# Patient Record
Sex: MALE | Race: BLACK OR AFRICAN AMERICAN | NOT HISPANIC OR LATINO | Employment: OTHER | URBAN - METROPOLITAN AREA
[De-identification: names, ages, dates, MRNs, and addresses within clinical notes are randomized per-mention and may not be internally consistent; named-entity substitution may affect disease eponyms.]

---

## 2017-01-04 ENCOUNTER — TELEPHONE (OUTPATIENT)
Dept: INTERNAL MEDICINE | Facility: CLINIC | Age: 62
End: 2017-01-04

## 2017-01-04 NOTE — TELEPHONE ENCOUNTER
----- Message from Lisa Howe sent at 1/4/2017  3:17 PM CST -----  _  1st Request  _  2nd Request  _  3rd Request        Who: Tee Mtz    Why: pt is calling to see if you can call something in for his sinus (cold pack) [facility]   Telephone Fax   [phone]        What Number to Call Back:[phone]    When to Expect a call back: (Before the end of the day)   -- if call after 3:00 call back will be tomorrow.

## 2017-01-13 RX ORDER — HYDROCHLOROTHIAZIDE 12.5 MG/1
CAPSULE ORAL
Qty: 30 CAPSULE | Refills: 0 | Status: SHIPPED | OUTPATIENT
Start: 2017-01-13 | End: 2017-02-14 | Stop reason: SDUPTHER

## 2017-01-13 RX ORDER — ATORVASTATIN CALCIUM 80 MG/1
TABLET, FILM COATED ORAL
Qty: 30 TABLET | Refills: 0 | Status: SHIPPED | OUTPATIENT
Start: 2017-01-13 | End: 2017-02-14 | Stop reason: SDUPTHER

## 2017-02-14 RX ORDER — HYDROCHLOROTHIAZIDE 12.5 MG/1
CAPSULE ORAL
Qty: 90 CAPSULE | Refills: 0 | Status: SHIPPED | OUTPATIENT
Start: 2017-02-14 | End: 2017-05-18 | Stop reason: SDUPTHER

## 2017-02-14 RX ORDER — ATORVASTATIN CALCIUM 80 MG/1
TABLET, FILM COATED ORAL
Qty: 90 TABLET | Refills: 0 | Status: SHIPPED | OUTPATIENT
Start: 2017-02-14 | End: 2017-05-18 | Stop reason: SDUPTHER

## 2017-02-27 ENCOUNTER — PATIENT OUTREACH (OUTPATIENT)
Dept: ADMINISTRATIVE | Facility: HOSPITAL | Age: 62
End: 2017-02-27

## 2017-03-14 ENCOUNTER — OFFICE VISIT (OUTPATIENT)
Dept: INTERNAL MEDICINE | Facility: CLINIC | Age: 62
End: 2017-03-14
Attending: FAMILY MEDICINE
Payer: MEDICAID

## 2017-03-14 VITALS
DIASTOLIC BLOOD PRESSURE: 84 MMHG | HEART RATE: 65 BPM | HEIGHT: 70 IN | WEIGHT: 200.19 LBS | SYSTOLIC BLOOD PRESSURE: 128 MMHG | BODY MASS INDEX: 28.66 KG/M2

## 2017-03-14 DIAGNOSIS — E78.5 HYPERLIPIDEMIA, UNSPECIFIED HYPERLIPIDEMIA TYPE: ICD-10-CM

## 2017-03-14 DIAGNOSIS — I10 ESSENTIAL HYPERTENSION: Primary | ICD-10-CM

## 2017-03-14 PROCEDURE — 99213 OFFICE O/P EST LOW 20 MIN: CPT | Mod: PBBFAC | Performed by: FAMILY MEDICINE

## 2017-03-14 PROCEDURE — 99213 OFFICE O/P EST LOW 20 MIN: CPT | Mod: S$PBB,,, | Performed by: FAMILY MEDICINE

## 2017-03-14 PROCEDURE — 99999 PR PBB SHADOW E&M-EST. PATIENT-LVL III: CPT | Mod: PBBFAC,,, | Performed by: FAMILY MEDICINE

## 2017-03-14 NOTE — MR AVS SNAPSHOT
[facility]  [address]  [place] [address]  Phone: [phone]  Fax: [phone]                  Tee Mtz   3/14/2017 6:40 AM   Office Visit    Description:  Male : 1955   Provider:  Ly Nava MD   Department:  [facility] Medicine           Reason for Visit     Hypertension     Hyperlipidemia           Diagnoses this Visit        Comments    Hyperlipidemia, unspecified hyperlipidemia type    -  Primary            To Do List           Future Appointments        Provider Department Dept Phone    2017 7:30 AM LAB, [facility] [phone]      Goals (5 Years of Data)     None      [facility]     [facility] On [facility] Nurse Care Line -  Assistance  Registered nurses in the [facility] Center provide clinical advisement, health education, appointment booking, and other advisory services.  Call for this free service at [phone].             Medications           Message regarding Medications     Verify the changes and/or additions to your medication regime listed below are the same as discussed with your clinician today.  If any of these changes or additions are incorrect, please notify your healthcare provider.        STOP taking these medications     hydrochlorothiazide (HYDRODIURIL) 12.5 MG Tab Take 1 tablet (12.5 mg total) by mouth once daily.           Verify that the below list of medications is an accurate representation of the medications you are currently taking.  If none reported, the list may be blank. If incorrect, please contact your healthcare provider. Carry this list with you in case of emergency.           Current Medications     atorvastatin (LIPITOR) 80 MG tablet TAKE ONE TABLET BY MOUTH AT BEDTIME    hydrochlorothiazide (MICROZIDE) 12.5 mg capsule TAKE ONE CAPSULE BY MOUTH EVERY DAY    lisinopril (PRINIVIL,ZESTRIL) 40 MG tablet Take 1 tablet (40 mg total) by mouth once daily.    metoprolol tartrate  "(LOPRESSOR) 50 MG tablet Take 1 tablet (50 mg total) by mouth 2 (two) times daily.    mometasone (NASONEX) 50 mcg/actuation nasal spray 2 sprays by Nasal route once daily.    ranitidine (ZANTAC) 150 MG tablet take one tablet by mouth twice daily Once a day Orally 30 days    trazodone (DESYREL) 50 MG tablet Take 1 tablet (50 mg total) by mouth nightly as needed for Insomnia.    hydrocodone-acetaminophen 10-325mg (NORCO)  mg Tab Take one tab po five times daily. Pt t is to be seen by Dr. Pitts  for future refills--DOMINGA ASTORGA           Clinical Reference Information           Your Vitals Were     BP Pulse Height Weight BMI    128/84 (BP Location: Left arm, Patient Position: Sitting, BP Method: Manual) 65 5' 10" (1.778 m) 90.8 kg (200 lb 2.8 oz) 28.72 kg/m2      Blood Pressure          Most Recent Value    BP  128/84      Allergies as of 3/14/2017     No Known Allergies      Immunizations Administered on Date of Encounter - 3/14/2017     None      Orders Placed During Today's Visit     Future Labs/Procedures Expected by Expires    Comprehensive metabolic panel  6/14/2017 3/14/2018    Hemoglobin A1c  6/14/2017 3/14/2018    Lipid panel  6/14/2017 3/14/2018      [facility] Sign-Up     Activating your [facility] account is as easy as 1-2-3!     1) Visit [email or web address], select Sign Up Now, enter this activation code and your date of birth, then select Next.  [email or web address]  Expires: 4/28/2017  7:24 AM      2) Create a username and password to use when you visit [facility] in the future and select a security question in case you lose your password and select Next.    3) Enter your e-mail address and click Sign Up!    Additional Information  If you have questions, please e-mail [email or web address] or call [phone] to talk to our [facility] staff. Remember, [facility] is NOT to be used for urgent needs. For medical emergencies, dial 911.         Language Assistance Services     ATTENTION: Language assistance " services are available, free of charge. Please call [phone].      ATENCIÓN: Si habla meaghan, tiene a mcpherson disposición servicios gratuitos de asistencia lingüística. Llame al [phone].     CHÚ Ý: N?u b?n nói Ti?ng Vi?t, có các d?ch v? h? tr? ngôn ng? mi?n phí dành cho b?n. G?i s? [phone].         [facility] complies with applicable Federal civil rights laws and does not discriminate on the basis of race, color, national origin, age, disability, or sex.

## 2017-03-14 NOTE — PROGRESS NOTES
Subjective:       Patient ID: Tee Mtz is a 61 y.o. male.    Chief Complaint: Hypertension (3 mth f/u ) and Hyperlipidemia    HPI Comments: Pt here for 3 mos f/u on HTN and labs. Aware today that labs look good exc for his chol panel and that a1c is at 6. Pt scared that he is developing diabetes and agrees that he needs to exercise. Pt willing to do and states that he will make lifestyle changes.    Hypertension   Pertinent negatives include no chest pain, palpitations or shortness of breath.   Hyperlipidemia   Pertinent negatives include no chest pain or shortness of breath.     Review of Systems   Constitutional: Negative for activity change and appetite change.   HENT: Negative.    Eyes: Negative for photophobia and visual disturbance.   Respiratory: Negative for chest tightness and shortness of breath.    Cardiovascular: Negative for chest pain, palpitations and leg swelling.   Neurological: Negative for dizziness, weakness and light-headedness.       Objective:      Physical Exam   Constitutional: He is oriented to person, place, and time. He appears well-developed and well-nourished.   HENT:   Head: Normocephalic and atraumatic.   Eyes: Conjunctivae and EOM are normal. Pupils are equal, round, and reactive to light. Right eye exhibits no discharge. Left eye exhibits no discharge. No scleral icterus.   Neck: Normal range of motion. Neck supple. No JVD present.   Cardiovascular: Normal rate, regular rhythm, normal heart sounds and intact distal pulses.    Pulmonary/Chest: Effort normal and breath sounds normal.   Musculoskeletal: Normal range of motion. He exhibits no edema or tenderness.   Neurological: He is alert and oriented to person, place, and time. He has normal reflexes. No cranial nerve deficit. He exhibits normal muscle tone. Coordination normal.   Skin: Skin is warm and dry.   Psychiatric: He has a normal mood and affect. His behavior is normal. Judgment and thought content normal.        Assessment:       1. Hyperlipidemia, unspecified hyperlipidemia type      2. HTN    Plan:       HTN controlled on meds  elev chol: lifestyle mods d/w pt. Repeat in 3 mos ordered today  a1c will be repeated as well  RTC prn symptoms or 3 mos PENDING lab results

## 2017-05-18 DIAGNOSIS — G47.00 INSOMNIA, UNSPECIFIED TYPE: ICD-10-CM

## 2017-05-18 DIAGNOSIS — I10 ESSENTIAL HYPERTENSION: ICD-10-CM

## 2017-05-18 RX ORDER — ATORVASTATIN CALCIUM 80 MG/1
TABLET, FILM COATED ORAL
Qty: 90 TABLET | Refills: 0 | Status: SHIPPED | OUTPATIENT
Start: 2017-05-18 | End: 2017-08-16 | Stop reason: SDUPTHER

## 2017-05-18 RX ORDER — LISINOPRIL 40 MG/1
40 TABLET ORAL DAILY
Qty: 90 TABLET | Refills: 0 | Status: SHIPPED | OUTPATIENT
Start: 2017-05-18 | End: 2017-08-16 | Stop reason: SDUPTHER

## 2017-05-18 RX ORDER — TRAZODONE HYDROCHLORIDE 50 MG/1
TABLET ORAL
Qty: 30 TABLET | Refills: 0 | Status: SHIPPED | OUTPATIENT
Start: 2017-05-18 | End: 2017-09-20 | Stop reason: SDUPTHER

## 2017-05-18 RX ORDER — METOPROLOL TARTRATE 50 MG/1
50 TABLET ORAL 2 TIMES DAILY
Qty: 180 TABLET | Refills: 0 | Status: SHIPPED | OUTPATIENT
Start: 2017-05-18 | End: 2017-09-20 | Stop reason: SDUPTHER

## 2017-05-18 RX ORDER — HYDROCHLOROTHIAZIDE 12.5 MG/1
CAPSULE ORAL
Qty: 90 CAPSULE | Refills: 0 | Status: SHIPPED | OUTPATIENT
Start: 2017-05-18 | End: 2017-08-16 | Stop reason: SDUPTHER

## 2017-05-18 NOTE — TELEPHONE ENCOUNTER
----- Message from Sury Leonard sent at 5/18/2017  8:32 AM CDT -----  Contact: pt  x_  1st Request  _  2nd Request  _  3rd Request    Please refill the medication(s) listed below.     Medication #1atorvastatin (LIPITOR) 80 MG tablet    Medication #2hydrochlorothiazide (MICROZIDE) 12.5 mg capsule    Medication#3 lisinopril (PRINIVIL,ZESTRIL) 40 MG tablet    Medication#4metoprolol tartrate (LOPRESSOR) 50 MG tablet    Medication#5ranitidine (ZANTAC) 150 MG tablet    Medication#6trazodone (DESYREL) 50 MG tablet      Preferred Pharmacy:[address]

## 2017-07-10 DIAGNOSIS — Z12.11 COLON CANCER SCREENING: ICD-10-CM

## 2017-08-16 DIAGNOSIS — I10 ESSENTIAL HYPERTENSION: ICD-10-CM

## 2017-08-16 RX ORDER — ATORVASTATIN CALCIUM 80 MG/1
TABLET, FILM COATED ORAL
Qty: 30 TABLET | Refills: 0 | Status: SHIPPED | OUTPATIENT
Start: 2017-08-16 | End: 2017-09-20 | Stop reason: SDUPTHER

## 2017-08-16 RX ORDER — LISINOPRIL 40 MG/1
TABLET ORAL
Qty: 30 TABLET | Refills: 0 | Status: SHIPPED | OUTPATIENT
Start: 2017-08-16 | End: 2017-09-20 | Stop reason: SDUPTHER

## 2017-08-16 RX ORDER — HYDROCHLOROTHIAZIDE 12.5 MG/1
CAPSULE ORAL
Qty: 30 CAPSULE | Refills: 0 | Status: SHIPPED | OUTPATIENT
Start: 2017-08-16 | End: 2017-09-20 | Stop reason: SDUPTHER

## 2017-08-22 DIAGNOSIS — G47.00 INSOMNIA, UNSPECIFIED TYPE: ICD-10-CM

## 2017-08-22 RX ORDER — TRAZODONE HYDROCHLORIDE 50 MG/1
TABLET ORAL
Qty: 30 TABLET | OUTPATIENT
Start: 2017-08-22

## 2017-08-24 ENCOUNTER — TELEPHONE (OUTPATIENT)
Dept: INTERNAL MEDICINE | Facility: CLINIC | Age: 62
End: 2017-08-24

## 2017-08-24 NOTE — TELEPHONE ENCOUNTER
----- Message from Irena Johnson sent at 8/24/2017 10:44 AM CDT -----  Contact: CHOLO MAYFIELD [[id number]]  _x  1st Request  _  2nd Request  _  3rd Request    Please refill the medication(s) listed below.     Medication #1 trazodone (DESYREL) 50 MG tablet    Preferred Pharmacy:  [address] [address]  Phone: [phone] Fax: [phone]

## 2017-08-24 NOTE — TELEPHONE ENCOUNTER
Left a detailed message informing the pt that Dr. Nava was out until Monday and that at his last office visit in march he should have return in 3 mos. I advised the pt to call back and schedule a follow up appointment for continued med refill

## 2017-09-20 DIAGNOSIS — G47.00 INSOMNIA, UNSPECIFIED TYPE: ICD-10-CM

## 2017-09-20 DIAGNOSIS — I10 ESSENTIAL HYPERTENSION: ICD-10-CM

## 2017-09-20 RX ORDER — LISINOPRIL 40 MG/1
40 TABLET ORAL DAILY
Qty: 30 TABLET | Refills: 0 | Status: SHIPPED | OUTPATIENT
Start: 2017-09-20 | End: 2017-11-18 | Stop reason: SDUPTHER

## 2017-09-20 RX ORDER — HYDROCHLOROTHIAZIDE 12.5 MG/1
12.5 CAPSULE ORAL DAILY
Qty: 30 CAPSULE | Refills: 0 | Status: SHIPPED | OUTPATIENT
Start: 2017-09-20 | End: 2017-12-21 | Stop reason: SDUPTHER

## 2017-09-20 RX ORDER — TRAZODONE HYDROCHLORIDE 50 MG/1
50 TABLET ORAL NIGHTLY
Qty: 30 TABLET | Refills: 0 | Status: SHIPPED | OUTPATIENT
Start: 2017-09-20 | End: 2017-12-21 | Stop reason: SDUPTHER

## 2017-09-20 RX ORDER — ATORVASTATIN CALCIUM 80 MG/1
80 TABLET, FILM COATED ORAL NIGHTLY
Qty: 30 TABLET | Refills: 0 | Status: SHIPPED | OUTPATIENT
Start: 2017-09-20 | End: 2017-12-21 | Stop reason: SDUPTHER

## 2017-09-20 RX ORDER — HYDROCODONE BITARTRATE AND ACETAMINOPHEN 10; 325 MG/1; MG/1
TABLET ORAL
Qty: 35 TABLET | Refills: 0 | OUTPATIENT
Start: 2017-09-20

## 2017-09-20 RX ORDER — MOMETASONE FUROATE 50 UG/1
2 SPRAY, METERED NASAL DAILY
Qty: 17 G | Refills: 5 | Status: SHIPPED | OUTPATIENT
Start: 2017-09-20 | End: 2018-02-13

## 2017-09-20 RX ORDER — METOPROLOL TARTRATE 50 MG/1
50 TABLET ORAL 2 TIMES DAILY
Qty: 180 TABLET | Refills: 0 | Status: SHIPPED | OUTPATIENT
Start: 2017-09-20 | End: 2017-12-21 | Stop reason: SDUPTHER

## 2017-09-20 NOTE — TELEPHONE ENCOUNTER
"----- Message from Lou Nielsen sent at 9/20/2017  9:01 AM CDT -----  Contact: Patient Himself  X 1st Request  _  2nd Request  _  3rd Request    Who: Tee Mtz (mrn# [id number])    Why: Patient called requesting to schedule annual visit. I did attempt to schedule per patient's request; but I was unsuccessful.  Also patient states, "he needs a refill on all of his medications." Please give a call back at your earliest convenience.     THANKS!    What Number to Call Back: [phone]    When to Expect a call back: (With in 24 hours)                      "

## 2017-09-22 DIAGNOSIS — Z12.11 COLON CANCER SCREENING: ICD-10-CM

## 2017-11-18 DIAGNOSIS — I10 ESSENTIAL HYPERTENSION: ICD-10-CM

## 2017-11-20 RX ORDER — ATORVASTATIN CALCIUM 80 MG/1
80 TABLET, FILM COATED ORAL NIGHTLY
Qty: 30 TABLET | OUTPATIENT
Start: 2017-11-20

## 2017-11-20 RX ORDER — HYDROCHLOROTHIAZIDE 12.5 MG/1
12.5 CAPSULE ORAL DAILY
Qty: 30 CAPSULE | OUTPATIENT
Start: 2017-11-20

## 2017-11-20 RX ORDER — LISINOPRIL 40 MG/1
40 TABLET ORAL DAILY
Qty: 30 TABLET | Refills: 0 | Status: SHIPPED | OUTPATIENT
Start: 2017-11-20 | End: 2017-12-21 | Stop reason: SDUPTHER

## 2017-12-17 RX ORDER — HYDROCHLOROTHIAZIDE 12.5 MG/1
12.5 CAPSULE ORAL DAILY
Qty: 30 CAPSULE | OUTPATIENT
Start: 2017-12-17

## 2017-12-17 RX ORDER — ATORVASTATIN CALCIUM 80 MG/1
80 TABLET, FILM COATED ORAL NIGHTLY
Qty: 30 TABLET | OUTPATIENT
Start: 2017-12-17

## 2017-12-18 DIAGNOSIS — I10 ESSENTIAL HYPERTENSION: ICD-10-CM

## 2017-12-18 DIAGNOSIS — G47.00 INSOMNIA, UNSPECIFIED TYPE: ICD-10-CM

## 2017-12-18 DIAGNOSIS — E78.5 HYPERLIPIDEMIA LDL GOAL <130: Primary | ICD-10-CM

## 2017-12-18 RX ORDER — ATORVASTATIN CALCIUM 80 MG/1
80 TABLET, FILM COATED ORAL NIGHTLY
Qty: 30 TABLET | OUTPATIENT
Start: 2017-12-18

## 2017-12-18 RX ORDER — HYDROCHLOROTHIAZIDE 12.5 MG/1
12.5 CAPSULE ORAL DAILY
Qty: 30 CAPSULE | Refills: 0 | OUTPATIENT
Start: 2017-12-18

## 2017-12-18 RX ORDER — TRAZODONE HYDROCHLORIDE 50 MG/1
50 TABLET ORAL NIGHTLY
Qty: 30 TABLET | Refills: 0 | OUTPATIENT
Start: 2017-12-18

## 2017-12-18 RX ORDER — LISINOPRIL 40 MG/1
40 TABLET ORAL DAILY
Qty: 30 TABLET | Refills: 0 | OUTPATIENT
Start: 2017-12-18

## 2017-12-18 RX ORDER — ATORVASTATIN CALCIUM 80 MG/1
80 TABLET, FILM COATED ORAL NIGHTLY
Qty: 30 TABLET | Refills: 0 | OUTPATIENT
Start: 2017-12-18

## 2017-12-18 RX ORDER — HYDROCHLOROTHIAZIDE 12.5 MG/1
12.5 CAPSULE ORAL DAILY
Qty: 30 CAPSULE | OUTPATIENT
Start: 2017-12-18

## 2017-12-18 NOTE — TELEPHONE ENCOUNTER
----- Message from Dagoberto Angeles sent at 12/18/2017 11:26 AM CST -----  Contact: Pt  X_ 1st Request  _ 2nd Request  _ 3rd Request      Who: CHOLO MAYFIELD [[id number]]    Why: Patient is requesting all his prescription for refills on all medication    What Number to Call Back: [phone]    When to Expect a call back: (Before the end of the day)  -- if call after 3:00 call back will be tomorrow.

## 2017-12-19 NOTE — TELEPHONE ENCOUNTER
----- Message from Lisa Howe sent at 12/19/2017 11:57 AM CST -----  _  1st Request  _  2nd Request  _  3rd Request        Who: patient     Why: Requesting a call back in regards to getting refills of all his meds, informed pt he needed to make a follow up appt. He said if you can't see him in the next couple of days he will fine another doctor. He told me to put that in the message. Pt has medicaid and it would not let me schedule an appt    What Number to Call Back:[phone]    When to Expect a call back: (Within 24 hours)    Please return the call at earliest convenience. Thanks!

## 2017-12-19 NOTE — TELEPHONE ENCOUNTER
Spoke with the pt in regards to medication refill informed him the per Dr. Nava refills where denied needs to be seen. Schedule follow up appointment will mail reminder  Conversation was understood and it ended

## 2017-12-20 ENCOUNTER — TELEPHONE (OUTPATIENT)
Dept: INTERNAL MEDICINE | Facility: CLINIC | Age: 62
End: 2017-12-20

## 2017-12-20 NOTE — TELEPHONE ENCOUNTER
----- Message from Cris Luna sent at 12/20/2017  3:01 PM CST -----  Contact: Pat   x  1st Request  _  2nd Request  _  3rd Request      Please refill the medication(s) listed below. Please call the patient when the prescription(s) is ready for  at the phone number     [phone] Pat from the pharmacy  .    Medication #1 hydrochlorothiazide (MICROZIDE) 12.5 mg capsule 30 capsule     Medication #2 ranitidine (ZANTAC) 150 MG tablet 60 tablet     Medication #3 atorvastatin (LIPITOR) 80 MG tablet 30 tablet       Preferred Pharmacy:[address]

## 2017-12-20 NOTE — TELEPHONE ENCOUNTER
Spoke with Mr. Watts the pharmacist and informed him as well as advising the pt on yesterday that his prescription request was denied until seen. Mr Watts will contact the patient. Conversation was understood and it ended.

## 2017-12-21 ENCOUNTER — LAB VISIT (OUTPATIENT)
Dept: LAB | Facility: OTHER | Age: 62
End: 2017-12-21
Attending: FAMILY MEDICINE
Payer: MEDICAID

## 2017-12-21 ENCOUNTER — TELEPHONE (OUTPATIENT)
Dept: INTERNAL MEDICINE | Facility: CLINIC | Age: 62
End: 2017-12-21

## 2017-12-21 ENCOUNTER — OFFICE VISIT (OUTPATIENT)
Dept: INTERNAL MEDICINE | Facility: CLINIC | Age: 62
End: 2017-12-21
Attending: FAMILY MEDICINE
Payer: MEDICAID

## 2017-12-21 VITALS
HEIGHT: 70 IN | DIASTOLIC BLOOD PRESSURE: 89 MMHG | BODY MASS INDEX: 26.92 KG/M2 | SYSTOLIC BLOOD PRESSURE: 125 MMHG | HEART RATE: 91 BPM | WEIGHT: 188.06 LBS

## 2017-12-21 DIAGNOSIS — E78.5 HYPERLIPIDEMIA LDL GOAL <100: ICD-10-CM

## 2017-12-21 DIAGNOSIS — I10 ESSENTIAL HYPERTENSION: ICD-10-CM

## 2017-12-21 DIAGNOSIS — E78.5 HYPERLIPIDEMIA, UNSPECIFIED HYPERLIPIDEMIA TYPE: ICD-10-CM

## 2017-12-21 DIAGNOSIS — E78.5 HYPERLIPIDEMIA, UNSPECIFIED HYPERLIPIDEMIA TYPE: Primary | ICD-10-CM

## 2017-12-21 DIAGNOSIS — G47.00 INSOMNIA, UNSPECIFIED TYPE: ICD-10-CM

## 2017-12-21 LAB
ALBUMIN SERPL BCP-MCNC: 3.6 G/DL
ALP SERPL-CCNC: 54 U/L
ALT SERPL W/O P-5'-P-CCNC: 29 U/L
ANION GAP SERPL CALC-SCNC: 10 MMOL/L
AST SERPL-CCNC: 23 U/L
BILIRUB SERPL-MCNC: 0.3 MG/DL
BUN SERPL-MCNC: 10 MG/DL
CALCIUM SERPL-MCNC: 9.5 MG/DL
CHLORIDE SERPL-SCNC: 108 MMOL/L
CHOLEST SERPL-MCNC: 118 MG/DL
CHOLEST/HDLC SERPL: 3.6 {RATIO}
CO2 SERPL-SCNC: 23 MMOL/L
CREAT SERPL-MCNC: 1 MG/DL
EST. GFR  (AFRICAN AMERICAN): >60 ML/MIN/1.73 M^2
EST. GFR  (NON AFRICAN AMERICAN): >60 ML/MIN/1.73 M^2
ESTIMATED AVG GLUCOSE: 111 MG/DL
GLUCOSE SERPL-MCNC: 76 MG/DL
HBA1C MFR BLD HPLC: 5.5 %
HDLC SERPL-MCNC: 33 MG/DL
HDLC SERPL: 28 %
LDLC SERPL CALC-MCNC: 68 MG/DL
NONHDLC SERPL-MCNC: 85 MG/DL
POTASSIUM SERPL-SCNC: 3.9 MMOL/L
PROT SERPL-MCNC: 7.5 G/DL
SODIUM SERPL-SCNC: 141 MMOL/L
TRIGL SERPL-MCNC: 85 MG/DL
TSH SERPL DL<=0.005 MIU/L-ACNC: 1.03 UIU/ML

## 2017-12-21 PROCEDURE — 99214 OFFICE O/P EST MOD 30 MIN: CPT | Mod: S$PBB,,, | Performed by: FAMILY MEDICINE

## 2017-12-21 PROCEDURE — 99213 OFFICE O/P EST LOW 20 MIN: CPT | Mod: PBBFAC | Performed by: FAMILY MEDICINE

## 2017-12-21 PROCEDURE — 80061 LIPID PANEL: CPT

## 2017-12-21 PROCEDURE — 80053 COMPREHEN METABOLIC PANEL: CPT

## 2017-12-21 PROCEDURE — 36415 COLL VENOUS BLD VENIPUNCTURE: CPT

## 2017-12-21 PROCEDURE — 99999 PR PBB SHADOW E&M-EST. PATIENT-LVL III: CPT | Mod: PBBFAC,,, | Performed by: FAMILY MEDICINE

## 2017-12-21 PROCEDURE — 83036 HEMOGLOBIN GLYCOSYLATED A1C: CPT

## 2017-12-21 PROCEDURE — 84443 ASSAY THYROID STIM HORMONE: CPT

## 2017-12-21 RX ORDER — CYPROHEPTADINE HYDROCHLORIDE 4 MG/1
4 TABLET ORAL 3 TIMES DAILY PRN
Qty: 90 TABLET | Refills: 3 | Status: SHIPPED | OUTPATIENT
Start: 2017-12-21 | End: 2018-02-13

## 2017-12-21 RX ORDER — FLUTICASONE PROPIONATE 50 MCG
2 SPRAY, SUSPENSION (ML) NASAL DAILY
Refills: 5 | COMMUNITY
Start: 2017-11-18 | End: 2018-02-13

## 2017-12-21 RX ORDER — HYDROCHLOROTHIAZIDE 12.5 MG/1
12.5 CAPSULE ORAL DAILY
Qty: 30 CAPSULE | Refills: 6 | Status: SHIPPED | OUTPATIENT
Start: 2017-12-21 | End: 2018-08-08 | Stop reason: SDUPTHER

## 2017-12-21 RX ORDER — LISINOPRIL 40 MG/1
40 TABLET ORAL DAILY
Qty: 30 TABLET | Refills: 6 | Status: SHIPPED | OUTPATIENT
Start: 2017-12-21 | End: 2018-08-08 | Stop reason: SDUPTHER

## 2017-12-21 RX ORDER — NAPROXEN SODIUM 550 MG/1
TABLET ORAL
Refills: 0 | COMMUNITY
Start: 2017-10-20 | End: 2018-02-13

## 2017-12-21 RX ORDER — TRAMADOL HYDROCHLORIDE 50 MG/1
50 TABLET ORAL 2 TIMES DAILY PRN
Qty: 60 TABLET | Refills: 0 | Status: CANCELLED | OUTPATIENT
Start: 2017-12-21

## 2017-12-21 RX ORDER — METOPROLOL TARTRATE 50 MG/1
50 TABLET ORAL 2 TIMES DAILY
Qty: 180 TABLET | Refills: 6 | Status: SHIPPED | OUTPATIENT
Start: 2017-12-21 | End: 2018-08-08 | Stop reason: SDUPTHER

## 2017-12-21 RX ORDER — ATORVASTATIN CALCIUM 80 MG/1
80 TABLET, FILM COATED ORAL NIGHTLY
Qty: 30 TABLET | Refills: 6 | Status: SHIPPED | OUTPATIENT
Start: 2017-12-21 | End: 2018-08-08 | Stop reason: SDUPTHER

## 2017-12-21 RX ORDER — TRAZODONE HYDROCHLORIDE 100 MG/1
100 TABLET ORAL NIGHTLY
Qty: 30 TABLET | Refills: 5 | Status: SHIPPED | OUTPATIENT
Start: 2017-12-21 | End: 2018-09-04 | Stop reason: SDUPTHER

## 2017-12-21 NOTE — TELEPHONE ENCOUNTER
Pt has scheduled an appt today for medication refills. Pt verbalized understanding that he needed appt for refills and had no further questions or concerns.

## 2017-12-21 NOTE — TELEPHONE ENCOUNTER
"----- Message from Lisa Howe sent at 12/21/2017  9:01 AM CST -----  _  1st Request  _  2nd Request  _  3rd Request        Who: patient     Why: Requesting a call back in regards to his refill of his RX, pt is very upset " So what you're telling me is I have to go until the 9th with no medicine" please call pt     What Number to Call Back:[phone]    When to Expect a call back: (Within 24 hours)    Please return the call at earliest convenience. Thanks!      _  1st Request  _  2nd Request  _  3rd Request        Who:     Why: Requesting a call back in regards to     What Number to Call Back:    When to Expect a call back: (Within 24 hours)    Please return the call at earliest convenience. Thanks!                                                "

## 2017-12-21 NOTE — TELEPHONE ENCOUNTER
----- Message from Lisa Howe sent at 12/21/2017  9:58 AM CST -----  _  1st Request  _  2nd Request  _  3rd Request        Who: patient    Why: Returning a call from your office    What Number to Call Back:[phone]    When to Expect a call back: (Within 24 hours)    Please return the call at earliest convenience. Thanks!

## 2017-12-21 NOTE — TELEPHONE ENCOUNTER
Pt has scheduled appt for today for med refills. Pt understood he needed an appt to receive further refills. Pt demonstrated verbal understanding of information and had no further questions or concerns at this time.

## 2017-12-22 ENCOUNTER — TELEPHONE (OUTPATIENT)
Dept: INTERNAL MEDICINE | Facility: CLINIC | Age: 62
End: 2017-12-22

## 2017-12-22 NOTE — TELEPHONE ENCOUNTER
----- Message from Jj Montgomery MD sent at 12/22/2017  1:01 PM CST -----  Blood work looks very good.  Cholesterol is very good.  No changes in medications.  Followup as scheduled.

## 2017-12-22 NOTE — TELEPHONE ENCOUNTER
Pt inform that blood work looks good and no med changes at this time and he should f/u as schedule.Pt agrees and verbalize and has no further questions.

## 2017-12-22 NOTE — PROGRESS NOTES
"CHIEF COMPLAINT: Follow-up hypertension and hyperlipidemia    HISTORY OF PRESENT ILLNESS: The patient is a 62 -year-old white male.  The patient has no specific complaints today.  It has been a while since the patient has seen his primary care physician.     The patient has a history of stable hypertension on current medications.  Patient denies chest pain or shortness of breath today.    The patient has a history of stable hyperlipidemia on current medications.  The patient denies chest pain or shortness of breath today.  The patient denies muscle aches or myalgias suggestive of myositis.    REVIEW OF SYSTEMS:  GENERAL: No fever, chills, fatigability or weight loss.  SKIN: No rashes, itching or changes in color or texture of skin.  HEAD: No headaches or recent head trauma.  EYES: Visual acuity fine. No photophobia, ocular pain or diplopia.  EARS: Denies ear pain, discharge or vertigo.  NOSE: No loss of smell, no epistaxis or postnasal drip.  MOUTH & THROAT: No hoarseness or change in voice. No excessive gum bleeding.  NODES: Denies swollen glands.  CHEST: Denies RODARTE, cyanosis, wheezing, cough and sputum production.  CARDIOVASCULAR: Denies chest pain, PND, orthopnea or reduced exercise tolerance.  ABDOMEN: Appetite fine. No weight loss. Denies diarrhea, abdominal pain, hematemesis or blood in stool.  URINARY: No flank pain, dysuria or hematuria.  PERIPHERAL VASCULAR: No claudication or cyanosis.  MUSCULOSKELETAL: No joint stiffness or swelling.   NEUROLOGIC: No history of seizures, paralysis, alteration of gait or coordination.    SOCIAL HISTORY: The patient does not smoke.  The patient consumes alcohol socially.      PHYSICAL EXAMINATION:   Blood pressure 125/89, pulse 91, height 5' 10" (1.778 m), weight 85.3 kg (188 lb 0.8 oz).    APPEARANCE: Well nourished, well developed, in no acute distress.    HEAD: Normocephalic, atraumatic.  EYES: PERRL. EOMI.  Conjunctivae without injection and  anicteric  EARS: TM's " intact. Light reflex normal. No retraction or perforation.    NOSE: Mucosa pink. Airway clear.  MOUTH & THROAT: No tonsillar enlargement. No pharyngeal erythema or exudate. No stridor.  NECK: Supple.   NODES: No cervical, axillary or inguinal lymph node enlargement.  CHEST: Lungs clear to auscultation.  No retractions are noted.  No rales or rhonchi are present.  CARDIOVASCULAR: Normal S1, S2. No rubs, murmurs or gallops.  ABDOMEN: Bowel sounds normal. Not distended. Soft. No tenderness or masses.  No ascites is noted.  MUSCULOSKELETAL:  There is no clubbing, cyanosis, or edema of the extremities x4.  There is full range of motion of the lumbar spine.  There is full range of motion of the extremities x4.  There is no deformity noted.    NEUROLOGIC:       Normal speech development.      Hearing normal.      Normal gait.      Motor and sensory exams grossly normal.      DTR's normal.  PSYCHIATRIC: Patient is alert and oriented x3.  Thought processes are all normal.  There is no homicidality.  There is no suicidality.  There is no evidence of psychosis.    LABORATORY/RADIOLOGY:   Chart reviewed.  We will update blood work today.    ASSESSMENT:   Hypertension  Hyperlipidemia   Insomnia not responsive to current dose of trazodone     PLAN:  Follow up blood work  Increase trazodone to 100 qhs  Return to clinic next year.

## 2017-12-28 ENCOUNTER — PATIENT OUTREACH (OUTPATIENT)
Dept: ADMINISTRATIVE | Facility: HOSPITAL | Age: 62
End: 2017-12-28

## 2017-12-28 NOTE — PROGRESS NOTES
[facility] is committed to your overall health.  To help you get the most out of each of your visits, we will review your information to make sure you are up to date on all of your recommended tests and/or procedures.      Dr. Nava has found that your chart shows you may be due for:    Health Maintenance Due   Topic Date Due    Pneumococcal PPSV23 (Medium Risk) (1) 04/02/1973    Colonoscopy  04/02/2005    Zoster Vaccine  04/02/2015    Influenza Vaccine  08/01/2017        If you have had any of the above done at another facility, please bring the records or information with you so that your record at [facility] will be complete.  If you would like to schedule any of these, please contact me.      Funmilayo Tafoya LPN  Clinic Care Coordinator  Internal Medicine  [facility]  [address]  [place] [address]  [phone]

## 2018-01-31 ENCOUNTER — OFFICE VISIT (OUTPATIENT)
Dept: INTERNAL MEDICINE | Facility: CLINIC | Age: 63
End: 2018-01-31
Attending: FAMILY MEDICINE
Payer: MEDICAID

## 2018-01-31 VITALS
HEIGHT: 69 IN | HEART RATE: 68 BPM | OXYGEN SATURATION: 100 % | SYSTOLIC BLOOD PRESSURE: 132 MMHG | WEIGHT: 187.81 LBS | BODY MASS INDEX: 27.82 KG/M2 | DIASTOLIC BLOOD PRESSURE: 89 MMHG

## 2018-01-31 DIAGNOSIS — Z98.61 CAD S/P PERCUTANEOUS CORONARY ANGIOPLASTY: ICD-10-CM

## 2018-01-31 DIAGNOSIS — G89.29 CHRONIC BILATERAL LOW BACK PAIN WITH BILATERAL SCIATICA: Primary | ICD-10-CM

## 2018-01-31 DIAGNOSIS — I10 ESSENTIAL HYPERTENSION: ICD-10-CM

## 2018-01-31 DIAGNOSIS — M54.41 CHRONIC BILATERAL LOW BACK PAIN WITH BILATERAL SCIATICA: Primary | ICD-10-CM

## 2018-01-31 DIAGNOSIS — I25.10 CAD S/P PERCUTANEOUS CORONARY ANGIOPLASTY: ICD-10-CM

## 2018-01-31 DIAGNOSIS — E78.5 HYPERLIPIDEMIA LDL GOAL <100: ICD-10-CM

## 2018-01-31 DIAGNOSIS — M54.42 CHRONIC BILATERAL LOW BACK PAIN WITH BILATERAL SCIATICA: Primary | ICD-10-CM

## 2018-01-31 PROCEDURE — 99213 OFFICE O/P EST LOW 20 MIN: CPT | Mod: PBBFAC | Performed by: FAMILY MEDICINE

## 2018-01-31 PROCEDURE — 99214 OFFICE O/P EST MOD 30 MIN: CPT | Mod: S$PBB,,, | Performed by: FAMILY MEDICINE

## 2018-01-31 PROCEDURE — 3008F BODY MASS INDEX DOCD: CPT | Mod: ,,, | Performed by: FAMILY MEDICINE

## 2018-01-31 PROCEDURE — 99999 PR PBB SHADOW E&M-EST. PATIENT-LVL III: CPT | Mod: PBBFAC,,, | Performed by: FAMILY MEDICINE

## 2018-01-31 NOTE — PROGRESS NOTES
"CHIEF COMPLAINT: Follow-up hypertension and hyperlipidemia    HISTORY OF PRESENT ILLNESS: The patient is a 62 -year-old white male.  The patient has chronic back pain.  He can no longer see his previous pain management physician due to insurance reasons.      The patient has a history of stable hypertension on current medications.  Patient denies chest pain or shortness of breath today.    The patient has a history of stable hyperlipidemia on current medications.  The patient denies chest pain or shortness of breath today.  The patient denies muscle aches or myalgias suggestive of myositis.    REVIEW OF SYSTEMS:  GENERAL: No fever, chills, fatigability or weight loss.  SKIN: No rashes, itching or changes in color or texture of skin.  HEAD: No headaches or recent head trauma.  EYES: Visual acuity fine. No photophobia, ocular pain or diplopia.  EARS: Denies ear pain, discharge or vertigo.  NOSE: No loss of smell, no epistaxis or postnasal drip.  MOUTH & THROAT: No hoarseness or change in voice. No excessive gum bleeding.  NODES: Denies swollen glands.  CHEST: Denies RODARTE, cyanosis, wheezing, cough and sputum production.  CARDIOVASCULAR: Denies chest pain, PND, orthopnea or reduced exercise tolerance.  ABDOMEN: Appetite fine. No weight loss. Denies diarrhea, abdominal pain, hematemesis or blood in stool.  URINARY: No flank pain, dysuria or hematuria.  PERIPHERAL VASCULAR: No claudication or cyanosis.  MUSCULOSKELETAL: No joint stiffness or swelling.   NEUROLOGIC: No history of seizures, paralysis, alteration of gait or coordination.    SOCIAL HISTORY: The patient does not smoke.  The patient consumes alcohol socially.      PHYSICAL EXAMINATION:   Blood pressure 132/89, pulse 68, height 5' 9" (1.753 m), weight 85.2 kg (187 lb 13.3 oz), SpO2 100 %.    APPEARANCE: Well nourished, well developed, in no acute distress.    HEAD: Normocephalic, atraumatic.  EYES: PERRL. EOMI.  Conjunctivae without injection and  " anicteric  EARS: TM's intact. Light reflex normal. No retraction or perforation.    NOSE: Mucosa pink. Airway clear.  MOUTH & THROAT: No tonsillar enlargement. No pharyngeal erythema or exudate. No stridor.  NECK: Supple.   NODES: No cervical, axillary or inguinal lymph node enlargement.  CHEST: Lungs clear to auscultation.  No retractions are noted.  No rales or rhonchi are present.  CARDIOVASCULAR: Normal S1, S2. No rubs, murmurs or gallops.  ABDOMEN: Bowel sounds normal. Not distended. Soft. No tenderness or masses.  No ascites is noted.  MUSCULOSKELETAL:  There is no clubbing, cyanosis, or edema of the extremities x4.  There is full range of motion of the lumbar spine.  There is full range of motion of the extremities x4.  There is no deformity noted.    NEUROLOGIC:       Normal speech development.      Hearing normal.      Normal gait.      Motor and sensory exams grossly normal.      DTR's normal.  PSYCHIATRIC: Patient is alert and oriented x3.  Thought processes are all normal.  There is no homicidality.  There is no suicidality.  There is no evidence of psychosis.    LABORATORY/RADIOLOGY:   Chart reviewed.      ASSESSMENT:   Chronic low back pain  Hypertension  Hyperlipidemia   Insomnia not responsive to current dose of trazodone     PLAN:  Pain clinic referral  Trazodone 100 qhs  Return to clinic next year.

## 2018-06-05 RX ORDER — CYPROHEPTADINE HYDROCHLORIDE 4 MG/1
4 TABLET ORAL 3 TIMES DAILY
Refills: 3 | COMMUNITY
Start: 2018-04-04 | End: 2018-08-08 | Stop reason: SDUPTHER

## 2018-06-05 RX ORDER — CYPROHEPTADINE HYDROCHLORIDE 4 MG/1
4 TABLET ORAL 3 TIMES DAILY
Refills: 3 | OUTPATIENT
Start: 2018-06-05

## 2018-06-05 NOTE — TELEPHONE ENCOUNTER
----- Message from Stephy Montemayor sent at 6/5/2018 11:20 AM CDT -----  Contact: CHOLO MAYFIELD [[id number]]  Can the clinic reply in [facility]: No      Please refill the medication(s) listed below. The patient can be reached at this phone number [phone]) once it is called into the pharmacy.      Medication #1 cyproheptadine (PERIACTIN) 4 mg tablet       Preferred Pharmacy: [address]

## 2018-07-26 DIAGNOSIS — R63.4 WEIGHT LOSS: Primary | ICD-10-CM

## 2018-07-26 NOTE — TELEPHONE ENCOUNTER
----- Message from Carmen Espinoza sent at 7/26/2018  2:43 PM CDT -----  Contact: pt   Can the clinic reply in [facility]:No       Please refill the medication(s) listed below. Please call the patient when the prescription(s) is ready for  at the phone number [phone]    Medication #1:cyproheptadine (PERIACTIN) 4 mg tablet    Medication #2:      Preferred Pharmacy: [place], [place] - [address] [place] [address]

## 2018-07-27 DIAGNOSIS — E78.5 HYPERLIPIDEMIA LDL GOAL <100: ICD-10-CM

## 2018-07-27 DIAGNOSIS — I10 ESSENTIAL HYPERTENSION: ICD-10-CM

## 2018-07-27 RX ORDER — HYDROCHLOROTHIAZIDE 12.5 MG/1
12.5 CAPSULE ORAL DAILY
Qty: 30 CAPSULE | OUTPATIENT
Start: 2018-07-27

## 2018-07-27 RX ORDER — ATORVASTATIN CALCIUM 80 MG/1
80 TABLET, FILM COATED ORAL NIGHTLY
Qty: 30 TABLET | OUTPATIENT
Start: 2018-07-27

## 2018-07-27 RX ORDER — CYPROHEPTADINE HYDROCHLORIDE 4 MG/1
4 TABLET ORAL 3 TIMES DAILY PRN
Qty: 90 TABLET | OUTPATIENT
Start: 2018-07-27

## 2018-07-27 RX ORDER — CYPROHEPTADINE HYDROCHLORIDE 4 MG/1
4 TABLET ORAL 3 TIMES DAILY
Qty: 90 TABLET | Refills: 3 | OUTPATIENT
Start: 2018-07-27

## 2018-08-07 DIAGNOSIS — I10 ESSENTIAL HYPERTENSION: ICD-10-CM

## 2018-08-07 DIAGNOSIS — K21.9 GASTROESOPHAGEAL REFLUX DISEASE, ESOPHAGITIS PRESENCE NOT SPECIFIED: Primary | ICD-10-CM

## 2018-08-07 DIAGNOSIS — E78.5 HYPERLIPIDEMIA LDL GOAL <100: ICD-10-CM

## 2018-08-07 RX ORDER — ATORVASTATIN CALCIUM 80 MG/1
80 TABLET, FILM COATED ORAL NIGHTLY
Qty: 30 TABLET | OUTPATIENT
Start: 2018-08-07

## 2018-08-07 RX ORDER — HYDROCHLOROTHIAZIDE 12.5 MG/1
12.5 CAPSULE ORAL DAILY
Qty: 30 CAPSULE | OUTPATIENT
Start: 2018-08-07

## 2018-08-08 RX ORDER — ATORVASTATIN CALCIUM 80 MG/1
80 TABLET, FILM COATED ORAL NIGHTLY
Qty: 30 TABLET | Refills: 0 | Status: SHIPPED | OUTPATIENT
Start: 2018-08-08 | End: 2018-09-04 | Stop reason: SDUPTHER

## 2018-08-08 RX ORDER — HYDROCHLOROTHIAZIDE 12.5 MG/1
12.5 CAPSULE ORAL DAILY
Qty: 30 CAPSULE | Refills: 0 | Status: SHIPPED | OUTPATIENT
Start: 2018-08-08 | End: 2018-09-04 | Stop reason: SDUPTHER

## 2018-08-08 RX ORDER — CYPROHEPTADINE HYDROCHLORIDE 4 MG/1
4 TABLET ORAL 3 TIMES DAILY
Qty: 90 TABLET | Refills: 0 | Status: SHIPPED | OUTPATIENT
Start: 2018-08-08 | End: 2018-09-04 | Stop reason: SDUPTHER

## 2018-08-08 RX ORDER — LISINOPRIL 40 MG/1
40 TABLET ORAL DAILY
Qty: 30 TABLET | Refills: 0 | Status: SHIPPED | OUTPATIENT
Start: 2018-08-08 | End: 2018-09-04 | Stop reason: SDUPTHER

## 2018-08-08 RX ORDER — METOPROLOL TARTRATE 50 MG/1
50 TABLET ORAL 2 TIMES DAILY
Qty: 60 TABLET | Refills: 0 | Status: SHIPPED | OUTPATIENT
Start: 2018-08-08 | End: 2018-09-04 | Stop reason: SDUPTHER

## 2018-08-21 ENCOUNTER — PATIENT OUTREACH (OUTPATIENT)
Dept: ADMINISTRATIVE | Facility: HOSPITAL | Age: 63
End: 2018-08-21

## 2018-08-21 DIAGNOSIS — Z12.11 SCREENING FOR COLORECTAL CANCER: Primary | ICD-10-CM

## 2018-08-21 DIAGNOSIS — Z12.12 SCREENING FOR COLORECTAL CANCER: Primary | ICD-10-CM

## 2018-09-04 ENCOUNTER — OFFICE VISIT (OUTPATIENT)
Dept: INTERNAL MEDICINE | Facility: CLINIC | Age: 63
End: 2018-09-04
Attending: FAMILY MEDICINE
Payer: MEDICAID

## 2018-09-04 VITALS
BODY MASS INDEX: 28.6 KG/M2 | HEIGHT: 69 IN | DIASTOLIC BLOOD PRESSURE: 60 MMHG | WEIGHT: 193.13 LBS | SYSTOLIC BLOOD PRESSURE: 100 MMHG | HEART RATE: 62 BPM | OXYGEN SATURATION: 96 %

## 2018-09-04 DIAGNOSIS — I25.10 CAD S/P PERCUTANEOUS CORONARY ANGIOPLASTY: Primary | ICD-10-CM

## 2018-09-04 DIAGNOSIS — Z12.11 SCREENING FOR COLORECTAL CANCER: ICD-10-CM

## 2018-09-04 DIAGNOSIS — Z12.12 SCREENING FOR COLORECTAL CANCER: ICD-10-CM

## 2018-09-04 DIAGNOSIS — G47.00 INSOMNIA, UNSPECIFIED TYPE: ICD-10-CM

## 2018-09-04 DIAGNOSIS — E78.5 HYPERLIPIDEMIA, UNSPECIFIED HYPERLIPIDEMIA TYPE: ICD-10-CM

## 2018-09-04 DIAGNOSIS — Z98.61 CAD S/P PERCUTANEOUS CORONARY ANGIOPLASTY: Primary | ICD-10-CM

## 2018-09-04 DIAGNOSIS — I10 ESSENTIAL HYPERTENSION: ICD-10-CM

## 2018-09-04 DIAGNOSIS — E78.5 HYPERLIPIDEMIA LDL GOAL <100: ICD-10-CM

## 2018-09-04 PROCEDURE — 99999 PR PBB SHADOW E&M-EST. PATIENT-LVL III: CPT | Mod: PBBFAC,,, | Performed by: FAMILY MEDICINE

## 2018-09-04 PROCEDURE — 99213 OFFICE O/P EST LOW 20 MIN: CPT | Mod: PBBFAC | Performed by: FAMILY MEDICINE

## 2018-09-04 PROCEDURE — 99214 OFFICE O/P EST MOD 30 MIN: CPT | Mod: S$PBB,,, | Performed by: FAMILY MEDICINE

## 2018-09-04 RX ORDER — HYDROCHLOROTHIAZIDE 12.5 MG/1
12.5 CAPSULE ORAL DAILY
Qty: 30 CAPSULE | Refills: 11 | Status: SHIPPED | OUTPATIENT
Start: 2018-09-04 | End: 2019-08-30 | Stop reason: SDUPTHER

## 2018-09-04 RX ORDER — METOPROLOL TARTRATE 50 MG/1
50 TABLET ORAL 2 TIMES DAILY
Qty: 60 TABLET | Refills: 11 | Status: SHIPPED | OUTPATIENT
Start: 2018-09-04 | End: 2019-09-28 | Stop reason: SDUPTHER

## 2018-09-04 RX ORDER — CYPROHEPTADINE HYDROCHLORIDE 4 MG/1
4 TABLET ORAL 3 TIMES DAILY
Qty: 90 TABLET | Refills: 5 | Status: SHIPPED | OUTPATIENT
Start: 2018-09-04 | End: 2019-03-01 | Stop reason: SDUPTHER

## 2018-09-04 RX ORDER — ATORVASTATIN CALCIUM 80 MG/1
80 TABLET, FILM COATED ORAL NIGHTLY
Qty: 30 TABLET | Refills: 11 | Status: SHIPPED | OUTPATIENT
Start: 2018-09-04 | End: 2019-08-30 | Stop reason: SDUPTHER

## 2018-09-04 RX ORDER — TRAZODONE HYDROCHLORIDE 100 MG/1
100 TABLET ORAL NIGHTLY
Qty: 30 TABLET | Refills: 5 | Status: SHIPPED | OUTPATIENT
Start: 2018-09-04 | End: 2019-03-01 | Stop reason: SDUPTHER

## 2018-09-04 RX ORDER — LISINOPRIL 40 MG/1
40 TABLET ORAL DAILY
Qty: 30 TABLET | Refills: 11 | Status: SHIPPED | OUTPATIENT
Start: 2018-09-04 | End: 2019-08-30 | Stop reason: SDUPTHER

## 2018-09-04 NOTE — PROGRESS NOTES
"CHIEF COMPLAINT: Follow-up hypertension and hyperlipidemia    HISTORY OF PRESENT ILLNESS: The patient is a 63 -year-old white male.  The patient has chronic back pain.  He can no longer see his previous pain management physician due to insurance reasons.      The patient has a history of stable hypertension on current medications.  Patient denies chest pain or shortness of breath today.    The patient has a history of stable hyperlipidemia on current medications.  The patient denies chest pain or shortness of breath today.  The patient denies muscle aches or myalgias suggestive of myositis.    REVIEW OF SYSTEMS:  GENERAL: No fever, chills, fatigability or weight loss.  SKIN: No rashes, itching or changes in color or texture of skin.  HEAD: No headaches or recent head trauma.  EYES: Visual acuity fine. No photophobia, ocular pain or diplopia.  EARS: Denies ear pain, discharge or vertigo.  NOSE: No loss of smell, no epistaxis or postnasal drip.  MOUTH & THROAT: No hoarseness or change in voice. No excessive gum bleeding.  NODES: Denies swollen glands.  CHEST: Denies RODARTE, cyanosis, wheezing, cough and sputum production.  CARDIOVASCULAR: Denies chest pain, PND, orthopnea or reduced exercise tolerance.  ABDOMEN: Appetite fine. No weight loss. Denies diarrhea, abdominal pain, hematemesis or blood in stool.  URINARY: No flank pain, dysuria or hematuria.  PERIPHERAL VASCULAR: No claudication or cyanosis.  MUSCULOSKELETAL: No joint stiffness or swelling.   NEUROLOGIC: No history of seizures, paralysis, alteration of gait or coordination.    SOCIAL HISTORY: The patient does not smoke.  The patient consumes alcohol socially.      PHYSICAL EXAMINATION:   Blood pressure 100/60, pulse 62, height 5' 9" (1.753 m), weight 87.6 kg (193 lb 2 oz), SpO2 96 %.    APPEARANCE: Well nourished, well developed, in no acute distress.    HEAD: Normocephalic, atraumatic.  EYES: PERRL. EOMI.  Conjunctivae without injection and  anicteric  EARS: " TM's intact. Light reflex normal. No retraction or perforation.    NOSE: Mucosa pink. Airway clear.  MOUTH & THROAT: No tonsillar enlargement. No pharyngeal erythema or exudate. No stridor.  NECK: Supple.   NODES: No cervical, axillary or inguinal lymph node enlargement.  CHEST: Lungs clear to auscultation.  No retractions are noted.  No rales or rhonchi are present.  CARDIOVASCULAR: Normal S1, S2. No rubs, murmurs or gallops.  ABDOMEN: Bowel sounds normal. Not distended. Soft. No tenderness or masses.  No ascites is noted.  MUSCULOSKELETAL:  There is no clubbing, cyanosis, or edema of the extremities x4.  There is full range of motion of the lumbar spine.  There is full range of motion of the extremities x4.  There is no deformity noted.    NEUROLOGIC:       Normal speech development.      Hearing normal.      Normal gait.      Motor and sensory exams grossly normal.      DTR's normal.  PSYCHIATRIC: Patient is alert and oriented x3.  Thought processes are all normal.  There is no homicidality.  There is no suicidality.  There is no evidence of psychosis.    LABORATORY/RADIOLOGY:   Chart reviewed.      ASSESSMENT:   Chronic low back pain  Hypertension  Hyperlipidemia   Insomnia responsive to current dose of trazodone     PLAN:  We will follow-up blood work which we expect to be normal.    Pain clinic referral  Trazodone 100 qhs  Return to clinic next year.

## 2018-09-28 DIAGNOSIS — Z12.11 COLON CANCER SCREENING: ICD-10-CM

## 2019-01-04 ENCOUNTER — LAB VISIT (OUTPATIENT)
Dept: LAB | Facility: HOSPITAL | Age: 64
End: 2019-01-04
Attending: FAMILY MEDICINE
Payer: MEDICAID

## 2019-01-04 ENCOUNTER — TELEPHONE (OUTPATIENT)
Dept: INTERNAL MEDICINE | Facility: CLINIC | Age: 64
End: 2019-01-04

## 2019-01-04 DIAGNOSIS — Z12.11 COLON CANCER SCREENING: ICD-10-CM

## 2019-01-04 LAB — HEMOCCULT STL QL IA: NEGATIVE

## 2019-01-04 PROCEDURE — 82274 ASSAY TEST FOR BLOOD FECAL: CPT

## 2019-01-04 NOTE — TELEPHONE ENCOUNTER
----- Message from Jj Montgomery MD sent at 1/4/2019 12:27 PM CST -----  The patient's recent Fit Kit was negative for blood.

## 2019-03-01 DIAGNOSIS — G47.00 INSOMNIA, UNSPECIFIED TYPE: ICD-10-CM

## 2019-03-01 DIAGNOSIS — K21.9 GASTROESOPHAGEAL REFLUX DISEASE, ESOPHAGITIS PRESENCE NOT SPECIFIED: ICD-10-CM

## 2019-03-01 RX ORDER — CYPROHEPTADINE HYDROCHLORIDE 4 MG/1
4 TABLET ORAL 3 TIMES DAILY
Qty: 30 TABLET | Refills: 0 | Status: SHIPPED | OUTPATIENT
Start: 2019-03-01 | End: 2019-12-16 | Stop reason: SDUPTHER

## 2019-03-01 RX ORDER — TRAZODONE HYDROCHLORIDE 100 MG/1
100 TABLET ORAL NIGHTLY
Qty: 30 TABLET | Refills: 0 | Status: SHIPPED | OUTPATIENT
Start: 2019-03-01 | End: 2021-07-08

## 2019-08-30 DIAGNOSIS — E78.5 HYPERLIPIDEMIA LDL GOAL <100: ICD-10-CM

## 2019-08-30 DIAGNOSIS — I10 ESSENTIAL HYPERTENSION: ICD-10-CM

## 2019-08-30 RX ORDER — HYDROCHLOROTHIAZIDE 12.5 MG/1
12.5 CAPSULE ORAL DAILY
Qty: 30 CAPSULE | Refills: 0 | Status: SHIPPED | OUTPATIENT
Start: 2019-08-30 | End: 2019-09-28 | Stop reason: SDUPTHER

## 2019-08-30 RX ORDER — ATORVASTATIN CALCIUM 80 MG/1
80 TABLET, FILM COATED ORAL NIGHTLY
Qty: 30 TABLET | Refills: 0 | Status: SHIPPED | OUTPATIENT
Start: 2019-08-30 | End: 2019-09-28 | Stop reason: SDUPTHER

## 2019-08-30 RX ORDER — LISINOPRIL 40 MG/1
40 TABLET ORAL DAILY
Qty: 30 TABLET | Refills: 0 | Status: SHIPPED | OUTPATIENT
Start: 2019-08-30 | End: 2019-09-28 | Stop reason: SDUPTHER

## 2019-09-28 DIAGNOSIS — I10 ESSENTIAL HYPERTENSION: ICD-10-CM

## 2019-09-28 DIAGNOSIS — E78.5 HYPERLIPIDEMIA LDL GOAL <100: ICD-10-CM

## 2019-09-30 RX ORDER — METOPROLOL TARTRATE 50 MG/1
50 TABLET ORAL 2 TIMES DAILY
Qty: 60 TABLET | Refills: 11 | Status: SHIPPED | OUTPATIENT
Start: 2019-09-30 | End: 2020-01-09 | Stop reason: SDUPTHER

## 2019-09-30 RX ORDER — HYDROCHLOROTHIAZIDE 12.5 MG/1
12.5 CAPSULE ORAL DAILY
Qty: 30 CAPSULE | Refills: 0 | Status: SHIPPED | OUTPATIENT
Start: 2019-09-30 | End: 2019-10-30 | Stop reason: SDUPTHER

## 2019-09-30 RX ORDER — ATORVASTATIN CALCIUM 80 MG/1
80 TABLET, FILM COATED ORAL NIGHTLY
Qty: 30 TABLET | Refills: 0 | Status: SHIPPED | OUTPATIENT
Start: 2019-09-30 | End: 2019-10-30 | Stop reason: SDUPTHER

## 2019-09-30 RX ORDER — LISINOPRIL 40 MG/1
40 TABLET ORAL DAILY
Qty: 30 TABLET | Refills: 0 | Status: SHIPPED | OUTPATIENT
Start: 2019-09-30 | End: 2019-10-30 | Stop reason: SDUPTHER

## 2019-10-30 DIAGNOSIS — I10 ESSENTIAL HYPERTENSION: ICD-10-CM

## 2019-10-30 DIAGNOSIS — E78.5 HYPERLIPIDEMIA LDL GOAL <100: ICD-10-CM

## 2019-10-30 RX ORDER — LISINOPRIL 40 MG/1
40 TABLET ORAL DAILY
Qty: 30 TABLET | Refills: 0 | Status: SHIPPED | OUTPATIENT
Start: 2019-10-30 | End: 2020-01-09 | Stop reason: SDUPTHER

## 2019-10-30 RX ORDER — HYDROCHLOROTHIAZIDE 12.5 MG/1
12.5 CAPSULE ORAL DAILY
Qty: 30 CAPSULE | Refills: 0 | Status: SHIPPED | OUTPATIENT
Start: 2019-10-30 | End: 2020-01-09 | Stop reason: SDUPTHER

## 2019-10-30 RX ORDER — ATORVASTATIN CALCIUM 80 MG/1
80 TABLET, FILM COATED ORAL NIGHTLY
Qty: 30 TABLET | Refills: 0 | Status: SHIPPED | OUTPATIENT
Start: 2019-10-30 | End: 2020-01-09 | Stop reason: SDUPTHER

## 2019-11-29 DIAGNOSIS — I10 ESSENTIAL HYPERTENSION: ICD-10-CM

## 2019-11-29 DIAGNOSIS — E78.5 HYPERLIPIDEMIA LDL GOAL <100: ICD-10-CM

## 2019-11-29 RX ORDER — LISINOPRIL 40 MG/1
40 TABLET ORAL DAILY
Qty: 30 TABLET | Refills: 0 | OUTPATIENT
Start: 2019-11-29

## 2019-11-29 RX ORDER — ATORVASTATIN CALCIUM 80 MG/1
80 TABLET, FILM COATED ORAL NIGHTLY
Qty: 30 TABLET | Refills: 0 | OUTPATIENT
Start: 2019-11-29

## 2019-11-29 RX ORDER — HYDROCHLOROTHIAZIDE 12.5 MG/1
12.5 CAPSULE ORAL DAILY
Qty: 30 CAPSULE | Refills: 0 | OUTPATIENT
Start: 2019-11-29

## 2019-12-05 ENCOUNTER — TELEPHONE (OUTPATIENT)
Dept: INTERNAL MEDICINE | Facility: CLINIC | Age: 64
End: 2019-12-05

## 2019-12-05 NOTE — TELEPHONE ENCOUNTER
----- Message from Tamiko Cortez sent at 12/5/2019  9:33 AM CST -----  Contact: CHOLO MAYFIELD [[id number]]   Name of Who is Calling:     What is the request in detail:  Patient request call back in reference to make annual appointment  Patient state he need appointment to get his medication  Please contact to further discuss and advise      Can the clinic reply by [facility]: no     [facility]  [phone]

## 2019-12-16 ENCOUNTER — LAB VISIT (OUTPATIENT)
Dept: LAB | Facility: OTHER | Age: 64
End: 2019-12-16
Attending: FAMILY MEDICINE
Payer: MEDICAID

## 2019-12-16 ENCOUNTER — OFFICE VISIT (OUTPATIENT)
Dept: INTERNAL MEDICINE | Facility: CLINIC | Age: 64
End: 2019-12-16
Attending: FAMILY MEDICINE
Payer: MEDICAID

## 2019-12-16 ENCOUNTER — TELEPHONE (OUTPATIENT)
Dept: INTERNAL MEDICINE | Facility: CLINIC | Age: 64
End: 2019-12-16

## 2019-12-16 VITALS
BODY MASS INDEX: 28.5 KG/M2 | SYSTOLIC BLOOD PRESSURE: 120 MMHG | OXYGEN SATURATION: 99 % | HEIGHT: 70 IN | DIASTOLIC BLOOD PRESSURE: 85 MMHG | HEART RATE: 73 BPM | WEIGHT: 199.06 LBS

## 2019-12-16 DIAGNOSIS — K21.9 GASTROESOPHAGEAL REFLUX DISEASE, ESOPHAGITIS PRESENCE NOT SPECIFIED: ICD-10-CM

## 2019-12-16 DIAGNOSIS — I25.10 CAD S/P PERCUTANEOUS CORONARY ANGIOPLASTY: ICD-10-CM

## 2019-12-16 DIAGNOSIS — J06.9 VIRAL UPPER RESPIRATORY TRACT INFECTION: Primary | ICD-10-CM

## 2019-12-16 DIAGNOSIS — I10 ESSENTIAL HYPERTENSION: ICD-10-CM

## 2019-12-16 DIAGNOSIS — Z98.61 CAD S/P PERCUTANEOUS CORONARY ANGIOPLASTY: ICD-10-CM

## 2019-12-16 LAB
ALBUMIN SERPL BCP-MCNC: 4.1 G/DL (ref 3.5–5.2)
ALP SERPL-CCNC: 43 U/L (ref 55–135)
ALT SERPL W/O P-5'-P-CCNC: 21 U/L (ref 10–44)
ANION GAP SERPL CALC-SCNC: 11 MMOL/L (ref 8–16)
AST SERPL-CCNC: 23 U/L (ref 10–40)
BILIRUB SERPL-MCNC: 0.9 MG/DL (ref 0.1–1)
BUN SERPL-MCNC: 15 MG/DL (ref 8–23)
CALCIUM SERPL-MCNC: 9.8 MG/DL (ref 8.7–10.5)
CHLORIDE SERPL-SCNC: 108 MMOL/L (ref 95–110)
CHOLEST SERPL-MCNC: 129 MG/DL (ref 120–199)
CHOLEST/HDLC SERPL: 3.1 {RATIO} (ref 2–5)
CO2 SERPL-SCNC: 22 MMOL/L (ref 23–29)
CREAT SERPL-MCNC: 1.3 MG/DL (ref 0.5–1.4)
EST. GFR  (AFRICAN AMERICAN): >60 ML/MIN/1.73 M^2
EST. GFR  (NON AFRICAN AMERICAN): 58 ML/MIN/1.73 M^2
ESTIMATED AVG GLUCOSE: 120 MG/DL (ref 68–131)
GLUCOSE SERPL-MCNC: 85 MG/DL (ref 70–110)
HBA1C MFR BLD HPLC: 5.8 % (ref 4–5.6)
HDLC SERPL-MCNC: 41 MG/DL (ref 40–75)
HDLC SERPL: 31.8 % (ref 20–50)
LDLC SERPL CALC-MCNC: 71.4 MG/DL (ref 63–159)
NONHDLC SERPL-MCNC: 88 MG/DL
POTASSIUM SERPL-SCNC: 4.2 MMOL/L (ref 3.5–5.1)
PROT SERPL-MCNC: 7.7 G/DL (ref 6–8.4)
SODIUM SERPL-SCNC: 141 MMOL/L (ref 136–145)
TRIGL SERPL-MCNC: 83 MG/DL (ref 30–150)
TSH SERPL DL<=0.005 MIU/L-ACNC: 0.84 UIU/ML (ref 0.4–4)

## 2019-12-16 PROCEDURE — 99999 PR PBB SHADOW E&M-EST. PATIENT-LVL III: CPT | Mod: PBBFAC,,, | Performed by: FAMILY MEDICINE

## 2019-12-16 PROCEDURE — 99214 OFFICE O/P EST MOD 30 MIN: CPT | Mod: S$PBB,,, | Performed by: FAMILY MEDICINE

## 2019-12-16 PROCEDURE — 80053 COMPREHEN METABOLIC PANEL: CPT

## 2019-12-16 PROCEDURE — 99214 PR OFFICE/OUTPT VISIT, EST, LEVL IV, 30-39 MIN: ICD-10-PCS | Mod: S$PBB,,, | Performed by: FAMILY MEDICINE

## 2019-12-16 PROCEDURE — 36415 COLL VENOUS BLD VENIPUNCTURE: CPT

## 2019-12-16 PROCEDURE — 84443 ASSAY THYROID STIM HORMONE: CPT

## 2019-12-16 PROCEDURE — 83036 HEMOGLOBIN GLYCOSYLATED A1C: CPT

## 2019-12-16 PROCEDURE — 80061 LIPID PANEL: CPT

## 2019-12-16 PROCEDURE — 99999 PR PBB SHADOW E&M-EST. PATIENT-LVL III: ICD-10-PCS | Mod: PBBFAC,,, | Performed by: FAMILY MEDICINE

## 2019-12-16 PROCEDURE — 99213 OFFICE O/P EST LOW 20 MIN: CPT | Mod: PBBFAC | Performed by: FAMILY MEDICINE

## 2019-12-16 RX ORDER — SILDENAFIL 100 MG/1
100 TABLET, FILM COATED ORAL DAILY PRN
Qty: 15 TABLET | Refills: 11 | Status: SHIPPED | OUTPATIENT
Start: 2019-12-16 | End: 2021-07-08

## 2019-12-16 RX ORDER — HYDROXYZINE HYDROCHLORIDE 25 MG/1
25 TABLET, FILM COATED ORAL 4 TIMES DAILY PRN
Qty: 15 TABLET | Refills: 0 | Status: SHIPPED | OUTPATIENT
Start: 2019-12-16 | End: 2021-07-08

## 2019-12-16 RX ORDER — CYPROHEPTADINE HYDROCHLORIDE 4 MG/1
4 TABLET ORAL 3 TIMES DAILY
Qty: 90 TABLET | Refills: 2 | Status: SHIPPED | OUTPATIENT
Start: 2019-12-16 | End: 2020-03-02 | Stop reason: SDUPTHER

## 2019-12-16 NOTE — PROGRESS NOTES
"CHIEF COMPLAINT: Follow-up hypertension and hyperlipidemia    HISTORY OF PRESENT ILLNESS: The patient is a 64 -year-old white male.  The patient has chronic back pain.  He can no longer see his previous pain management physician due to insurance reasons.      URI Sxs for 2 weeks.    The patient has a history of stable hypertension on current medications.  Patient denies chest pain or shortness of breath today.    The patient has a history of stable hyperlipidemia on current medications.  The patient denies chest pain or shortness of breath today.  The patient denies muscle aches or myalgias suggestive of myositis.    REVIEW OF SYSTEMS:  GENERAL: No fever, chills, fatigability or weight loss.  SKIN: No rashes, itching or changes in color or texture of skin.  HEAD: No headaches or recent head trauma.  EYES: Visual acuity fine. No photophobia, ocular pain or diplopia.  EARS: Denies ear pain, discharge or vertigo.  NOSE: No loss of smell, no epistaxis or postnasal drip.  MOUTH & THROAT: No hoarseness or change in voice. No excessive gum bleeding.  NODES: Denies swollen glands.  CHEST: Denies RODARTE, cyanosis, wheezing, cough and sputum production.  CARDIOVASCULAR: Denies chest pain, PND, orthopnea or reduced exercise tolerance.  ABDOMEN: Appetite fine. No weight loss. Denies diarrhea, abdominal pain, hematemesis or blood in stool.  URINARY: No flank pain, dysuria or hematuria.  PERIPHERAL VASCULAR: No claudication or cyanosis.  MUSCULOSKELETAL: No joint stiffness or swelling.   NEUROLOGIC: No history of seizures, paralysis, alteration of gait or coordination.    SOCIAL HISTORY: The patient does not smoke.  The patient consumes alcohol socially.      PHYSICAL EXAMINATION:   Blood pressure 120/85, pulse 73, height 5' 10" (1.778 m), weight 90.3 kg (199 lb 1.2 oz), SpO2 99 %.    APPEARANCE: Well nourished, well developed, in no acute distress.    HEAD: Normocephalic, atraumatic.  EYES: PERRL. EOMI.  Conjunctivae without " injection and  anicteric  EARS: TM's intact. Light reflex normal. No retraction or perforation.    NOSE: Mucosa pink. Airway clear.  MOUTH & THROAT: No tonsillar enlargement. No pharyngeal erythema or exudate. No stridor.  NECK: Supple.   NODES: No cervical, axillary or inguinal lymph node enlargement.  CHEST: Lungs clear to auscultation.  No retractions are noted.  No rales or rhonchi are present.  CARDIOVASCULAR: Normal S1, S2. No rubs, murmurs or gallops.  ABDOMEN: Bowel sounds normal. Not distended. Soft. No tenderness or masses.  No ascites is noted.  MUSCULOSKELETAL:  There is no clubbing, cyanosis, or edema of the extremities x4.  There is full range of motion of the lumbar spine.  There is full range of motion of the extremities x4.  There is no deformity noted.    NEUROLOGIC:       Normal speech development.      Hearing normal.      Normal gait.      Motor and sensory exams grossly normal.  PSYCHIATRIC: Patient is alert and oriented x3.  Thought processes are all normal.  There is no homicidality.  There is no suicidality.  There is no evidence of psychosis.    LABORATORY/RADIOLOGY:   Chart reviewed.      ASSESSMENT:   URI  Chronic low back pain  Hypertension  Hyperlipidemia   Insomnia responsive to current dose of trazodone     PLAN:  We will follow-up blood work which we expect to be normal.    Pain clinic referral  Trazodone 100 qhs  Return to clinic next year.

## 2019-12-16 NOTE — TELEPHONE ENCOUNTER
----- Message from Jj Montgomery MD sent at 12/16/2019  4:24 PM CST -----  Laboratory is normal.  No changes in medications.  Followup as scheduled.

## 2020-01-10 DIAGNOSIS — Z12.11 COLON CANCER SCREENING: ICD-10-CM

## 2020-02-29 DIAGNOSIS — I10 ESSENTIAL HYPERTENSION: ICD-10-CM

## 2020-02-29 DIAGNOSIS — E78.5 HYPERLIPIDEMIA LDL GOAL <100: ICD-10-CM

## 2020-03-02 RX ORDER — HYDROCHLOROTHIAZIDE 12.5 MG/1
12.5 CAPSULE ORAL DAILY
Qty: 30 CAPSULE | Refills: 0 | Status: SHIPPED | OUTPATIENT
Start: 2020-03-02 | End: 2020-04-21

## 2020-03-02 RX ORDER — LISINOPRIL 40 MG/1
40 TABLET ORAL DAILY
Qty: 30 TABLET | Refills: 0 | Status: SHIPPED | OUTPATIENT
Start: 2020-03-02 | End: 2020-04-21

## 2020-03-02 RX ORDER — ATORVASTATIN CALCIUM 80 MG/1
80 TABLET, FILM COATED ORAL NIGHTLY
Qty: 30 TABLET | Refills: 0 | Status: SHIPPED | OUTPATIENT
Start: 2020-03-02 | End: 2020-04-21

## 2020-03-02 NOTE — TELEPHONE ENCOUNTER
----- Message from Silva Randhawa sent at 3/2/2020  4:30 PM CST -----  Contact: Self [phone]  Type: RX Refill Request    Who Called: Self    Have you contacted your pharmacy:    Refill or New Rx:Refill    RX Name and Strength:cyproheptadine (PERIACTIN) 4 mg tablet    Preferred Pharmacy with phone number:  [address] [address]  Phone: [phone] Fax: [phone]    Local or Mail Order:Local    Would the patient rather a call back or a response via [facility]? Call back    Best Call Back Number: [phone]

## 2020-03-03 RX ORDER — CYPROHEPTADINE HYDROCHLORIDE 4 MG/1
4 TABLET ORAL 3 TIMES DAILY
Qty: 90 TABLET | Refills: 2 | Status: SHIPPED | OUTPATIENT
Start: 2020-03-03 | End: 2020-07-06

## 2020-04-21 DIAGNOSIS — I10 ESSENTIAL HYPERTENSION: ICD-10-CM

## 2020-04-21 DIAGNOSIS — E78.5 HYPERLIPIDEMIA LDL GOAL <100: ICD-10-CM

## 2020-04-21 RX ORDER — HYDROCHLOROTHIAZIDE 12.5 MG/1
12.5 CAPSULE ORAL DAILY
Qty: 30 CAPSULE | Refills: 0 | Status: SHIPPED | OUTPATIENT
Start: 2020-04-21 | End: 2020-06-08

## 2020-04-21 RX ORDER — ATORVASTATIN CALCIUM 80 MG/1
80 TABLET, FILM COATED ORAL NIGHTLY
Qty: 30 TABLET | Refills: 0 | Status: SHIPPED | OUTPATIENT
Start: 2020-04-21 | End: 2020-06-08

## 2020-04-21 RX ORDER — LISINOPRIL 40 MG/1
40 TABLET ORAL DAILY
Qty: 30 TABLET | Refills: 0 | Status: SHIPPED | OUTPATIENT
Start: 2020-04-21 | End: 2020-06-08

## 2020-07-27 ENCOUNTER — TELEPHONE (OUTPATIENT)
Dept: INTERNAL MEDICINE | Facility: CLINIC | Age: 65
End: 2020-07-27

## 2020-07-27 DIAGNOSIS — G89.21 CHRONIC PAIN DUE TO TRAUMA: Primary | ICD-10-CM

## 2020-07-27 NOTE — TELEPHONE ENCOUNTER
----- Message from Roxana Maldonado sent at 7/27/2020 12:29 PM CDT -----  Name of Who is Calling: pt    What is the request in detail: is requesting a referral for Dr. Venkat Prince a bone specialist. Please contact to further discuss and advise      Can the clinic reply by [facility]: no    What Number to Call Back if not in [facility]: [phone]

## 2020-07-27 NOTE — TELEPHONE ENCOUNTER
Previously referred in 2018 but his insurance would not send him. Need updated referral signed. Sent message to Dr. Montgomery.

## 2020-07-28 NOTE — TELEPHONE ENCOUNTER
Faxed updated referral. Patient was informed to call their office to schedule this afternoon or tomorrow and to let us know if he needs anything further.

## 2020-08-14 ENCOUNTER — TELEPHONE (OUTPATIENT)
Dept: INTERNAL MEDICINE | Facility: CLINIC | Age: 65
End: 2020-08-14

## 2020-08-14 DIAGNOSIS — M54.9 BACK PAIN, UNSPECIFIED BACK LOCATION, UNSPECIFIED BACK PAIN LATERALITY, UNSPECIFIED CHRONICITY: Primary | ICD-10-CM

## 2020-08-14 NOTE — TELEPHONE ENCOUNTER
----- Message from Ar Rojas sent at 8/14/2020 10:03 AM CDT -----  Name of Who is Calling: CHOLO MAYFIELD [[id number]]    What is the request in detail:CHOLO MAYFIELD [[id number]] is calling in regards to referral and asking can it be faxed over to [facility] ...  Please contact to further discuss and advise      Can the clinic reply by [facility]: no     What [facility]:  [phone] (home)

## 2020-08-31 ENCOUNTER — TELEPHONE (OUTPATIENT)
Dept: INTERNAL MEDICINE | Facility: CLINIC | Age: 65
End: 2020-08-31

## 2020-08-31 NOTE — TELEPHONE ENCOUNTER
----- Message from Ana Zabala sent at 8/31/2020 10:37 AM CDT -----  Regarding: Call Back  Name of Who is Calling : CHOLO MAYFIELD [[id number]]    Patient is requesting a call from staff in regards to his referral for Dr Lane pain management .....Please contact to further discuss and advise.    Can the clinic reply by [facility] : No    What Number to Call Back :  [phone]

## 2020-09-01 ENCOUNTER — TELEPHONE (OUTPATIENT)
Dept: INTERNAL MEDICINE | Facility: CLINIC | Age: 65
End: 2020-09-01

## 2020-09-01 NOTE — TELEPHONE ENCOUNTER
----- Message from Karen Alcantar sent at 9/1/2020  8:38 AM CDT -----    Type:  Patient Returning Call    Who Called: CHOLO MAYFIELD [[id number]]    Who Left Message for Patient: rowan     Does the patient know what this is regarding?: N    Can the clinic reply in [facility]: No    Best Call Back Number: [phone]    Additional Information: pt was informed referral was signed, and he was advised that his mail box is full

## 2020-11-18 DIAGNOSIS — E78.5 HYPERLIPIDEMIA LDL GOAL <100: ICD-10-CM

## 2020-11-18 DIAGNOSIS — I10 ESSENTIAL HYPERTENSION: ICD-10-CM

## 2020-11-18 RX ORDER — HYDROCHLOROTHIAZIDE 12.5 MG/1
12.5 CAPSULE ORAL DAILY
Qty: 30 CAPSULE | Refills: 0 | Status: SHIPPED | OUTPATIENT
Start: 2020-11-18 | End: 2020-12-28 | Stop reason: SDUPTHER

## 2020-11-18 RX ORDER — LISINOPRIL 40 MG/1
40 TABLET ORAL DAILY
Qty: 30 TABLET | Refills: 0 | Status: SHIPPED | OUTPATIENT
Start: 2020-11-18 | End: 2020-12-28 | Stop reason: SDUPTHER

## 2020-11-18 RX ORDER — CYPROHEPTADINE HYDROCHLORIDE 4 MG/1
4 TABLET ORAL 3 TIMES DAILY
Qty: 90 TABLET | Refills: 0 | Status: SHIPPED | OUTPATIENT
Start: 2020-11-18 | End: 2020-12-28 | Stop reason: SDUPTHER

## 2020-11-18 RX ORDER — ATORVASTATIN CALCIUM 80 MG/1
80 TABLET, FILM COATED ORAL NIGHTLY
Qty: 30 TABLET | Refills: 0 | Status: SHIPPED | OUTPATIENT
Start: 2020-11-18 | End: 2020-12-28 | Stop reason: SDUPTHER

## 2020-12-17 DIAGNOSIS — I10 ESSENTIAL HYPERTENSION: ICD-10-CM

## 2020-12-17 DIAGNOSIS — E78.5 HYPERLIPIDEMIA LDL GOAL <100: ICD-10-CM

## 2020-12-17 RX ORDER — HYDROCHLOROTHIAZIDE 12.5 MG/1
12.5 CAPSULE ORAL DAILY
Qty: 30 CAPSULE | Refills: 0 | OUTPATIENT
Start: 2020-12-17

## 2020-12-17 RX ORDER — LISINOPRIL 40 MG/1
40 TABLET ORAL DAILY
Qty: 30 TABLET | Refills: 0 | OUTPATIENT
Start: 2020-12-17

## 2020-12-17 RX ORDER — ATORVASTATIN CALCIUM 80 MG/1
80 TABLET, FILM COATED ORAL NIGHTLY
Qty: 30 TABLET | Refills: 0 | OUTPATIENT
Start: 2020-12-17

## 2020-12-17 RX ORDER — CYPROHEPTADINE HYDROCHLORIDE 4 MG/1
4 TABLET ORAL 3 TIMES DAILY
Qty: 90 TABLET | Refills: 0 | OUTPATIENT
Start: 2020-12-17

## 2020-12-18 NOTE — TELEPHONE ENCOUNTER
----- Message from Irena Johnson sent at 12/18/2020  9:42 AM CST -----  Contact: CHOLO MAYFIELD [[id number]]  Name of Who is Calling: CHOLO MAYFIELD [[id number]]      What is the request in detail: patient would like to be seen as soon as possible for annual check up states pharmacy advised him that he needs to be seen by doctor in order to get medication refilled. Please call to schedule     Can the clinic reply by [facility]:no      What Number to Call Back if not in [facility]: [phone] (home)

## 2020-12-22 ENCOUNTER — OFFICE VISIT (OUTPATIENT)
Dept: INTERNAL MEDICINE | Facility: CLINIC | Age: 65
End: 2020-12-22
Attending: FAMILY MEDICINE
Payer: MEDICARE

## 2020-12-22 VITALS
SYSTOLIC BLOOD PRESSURE: 122 MMHG | WEIGHT: 193.81 LBS | BODY MASS INDEX: 27.75 KG/M2 | HEIGHT: 70 IN | HEART RATE: 71 BPM | DIASTOLIC BLOOD PRESSURE: 70 MMHG | OXYGEN SATURATION: 98 %

## 2020-12-22 DIAGNOSIS — Z98.61 CAD S/P PERCUTANEOUS CORONARY ANGIOPLASTY: Primary | ICD-10-CM

## 2020-12-22 DIAGNOSIS — I10 ESSENTIAL HYPERTENSION: ICD-10-CM

## 2020-12-22 DIAGNOSIS — R06.83 SNORING: ICD-10-CM

## 2020-12-22 DIAGNOSIS — I25.10 CAD S/P PERCUTANEOUS CORONARY ANGIOPLASTY: Primary | ICD-10-CM

## 2020-12-22 DIAGNOSIS — H53.9 VISION CHANGES: ICD-10-CM

## 2020-12-22 PROCEDURE — 99213 OFFICE O/P EST LOW 20 MIN: CPT | Mod: PBBFAC | Performed by: FAMILY MEDICINE

## 2020-12-22 PROCEDURE — 99214 OFFICE O/P EST MOD 30 MIN: CPT | Mod: S$PBB,,, | Performed by: FAMILY MEDICINE

## 2020-12-22 PROCEDURE — 99999 PR PBB SHADOW E&M-EST. PATIENT-LVL III: CPT | Mod: PBBFAC,,, | Performed by: FAMILY MEDICINE

## 2020-12-22 PROCEDURE — 99214 PR OFFICE/OUTPT VISIT, EST, LEVL IV, 30-39 MIN: ICD-10-PCS | Mod: S$PBB,,, | Performed by: FAMILY MEDICINE

## 2020-12-22 PROCEDURE — 99999 PR PBB SHADOW E&M-EST. PATIENT-LVL III: ICD-10-PCS | Mod: PBBFAC,,, | Performed by: FAMILY MEDICINE

## 2020-12-23 ENCOUNTER — OFFICE VISIT (OUTPATIENT)
Dept: SLEEP MEDICINE | Facility: CLINIC | Age: 65
End: 2020-12-23
Attending: FAMILY MEDICINE
Payer: MEDICARE

## 2020-12-23 VITALS
DIASTOLIC BLOOD PRESSURE: 89 MMHG | HEIGHT: 70 IN | WEIGHT: 193.13 LBS | SYSTOLIC BLOOD PRESSURE: 149 MMHG | BODY MASS INDEX: 27.65 KG/M2 | HEART RATE: 93 BPM

## 2020-12-23 DIAGNOSIS — I45.2 RBBB (RIGHT BUNDLE BRANCH BLOCK WITH LEFT ANTERIOR FASCICULAR BLOCK): ICD-10-CM

## 2020-12-23 DIAGNOSIS — E78.5 HYPERLIPIDEMIA WITH TARGET LOW DENSITY LIPOPROTEIN (LDL) CHOLESTEROL LESS THAN 130 MG/DL: ICD-10-CM

## 2020-12-23 DIAGNOSIS — Z98.61 CAD S/P PERCUTANEOUS CORONARY ANGIOPLASTY: ICD-10-CM

## 2020-12-23 DIAGNOSIS — R06.83 SNORING: ICD-10-CM

## 2020-12-23 DIAGNOSIS — I10 ESSENTIAL HYPERTENSION: ICD-10-CM

## 2020-12-23 DIAGNOSIS — J43.1 PANLOBULAR EMPHYSEMA: Primary | ICD-10-CM

## 2020-12-23 DIAGNOSIS — I25.10 CAD S/P PERCUTANEOUS CORONARY ANGIOPLASTY: ICD-10-CM

## 2020-12-23 PROCEDURE — 99202 PR OFFICE/OUTPT VISIT, NEW, LEVL II, 15-29 MIN: ICD-10-PCS | Mod: S$PBB,,, | Performed by: INTERNAL MEDICINE

## 2020-12-23 PROCEDURE — 99499 RISK ADDL DX/OHS AUDIT: ICD-10-PCS | Mod: S$PBB,,, | Performed by: INTERNAL MEDICINE

## 2020-12-23 PROCEDURE — 99999 PR PBB SHADOW E&M-EST. PATIENT-LVL III: ICD-10-PCS | Mod: PBBFAC,,, | Performed by: INTERNAL MEDICINE

## 2020-12-23 PROCEDURE — 99213 OFFICE O/P EST LOW 20 MIN: CPT | Mod: PBBFAC | Performed by: INTERNAL MEDICINE

## 2020-12-23 PROCEDURE — 99499 UNLISTED E&M SERVICE: CPT | Mod: S$PBB,,, | Performed by: INTERNAL MEDICINE

## 2020-12-23 PROCEDURE — 99999 PR PBB SHADOW E&M-EST. PATIENT-LVL III: CPT | Mod: PBBFAC,,, | Performed by: INTERNAL MEDICINE

## 2020-12-23 PROCEDURE — 99202 OFFICE O/P NEW SF 15 MIN: CPT | Mod: S$PBB,,, | Performed by: INTERNAL MEDICINE

## 2020-12-23 NOTE — PROGRESS NOTES
Subjective:       Patient ID: Tee Mtz is a 65 y.o. male.    Chief Complaint: Sleep Apnea    I had the pleasure of seeing Tee Mtz today, who is a 65 y.o. male that presents with snoring.    Tee Mtz does not have a CDL.    Tee Mtz is not a shift worker.    Tee Mtz presents with has snoring that has been going on for 5 years    Bedtime when working ranges from 2200 to 2400.   When not working, bedtime ranges from 2200 to 2400.   Sleep latency ranges from 10 to 15 minutes.     Average number of awakenings is 0.   Wake up time when working is 0430 to 0630.   When not working, wake up time is 0430 to 0730.   Patient does feel rested upon awakening.    Tee Mtz consumes approximately 2 beverages with caffeine daily.   An average of 6 beverages with alcohol are consumed monthly   Medications taken for sleep currently: none  Previous medications taken: none     Tee Mtz does experience daytime sleepiness.   Naps are taken about 0 times weekly, usually lasting NA to NA minutes.  Tee currently does operate an automobile.  Tee Mtz does not experience drowsiness when driving.   Patient does doze off when sedentary.   Tee Mtz does not have auxiliary symptoms of narcolepsy including sleep onset paralysis, hypnagogic hallucinations, sleep attacks and cataplexy  ESS 11    Tee Mtz has a history of snoring.   Snoring is described as severe and constant.   Apneic episodes have not been noticed during sleep.   A witness to sleep is present.   The patient awakens with mouth dryness.      Tee Mtz does not have symptoms of Restless Legs Syndrome. Nocturnal leg movements have not been noticed.   The patient does experience sleep related leg cramps.   There is not a history of parasomnia      Current Outpatient Medications:     atorvastatin (LIPITOR) 80 MG tablet, Take 1 tablet (80 mg total) by mouth nightly., Disp: 30 tablet, Rfl: 0    cyproheptadine  (PERIACTIN) 4 mg tablet, Take 1 tablet (4 mg total) by mouth 3 (three) times daily., Disp: 90 tablet, Rfl: 0    hydroCHLOROthiazide (MICROZIDE) 12.5 mg capsule, Take 1 capsule (12.5 mg total) by mouth once daily., Disp: 30 capsule, Rfl: 0    hydrOXYzine HCl (ATARAX) 25 MG tablet, Take 1 tablet (25 mg total) by mouth 4 (four) times daily as needed (cough)., Disp: 15 tablet, Rfl: 0    lisinopriL (PRINIVIL,ZESTRIL) 40 MG tablet, Take 1 tablet (40 mg total) by mouth once daily., Disp: 30 tablet, Rfl: 0    metoprolol tartrate (LOPRESSOR) 50 MG tablet, Take 1 tablet (50 mg total) by mouth 2 (two) times daily., Disp: 60 tablet, Rfl: 11    naproxen (NAPROSYN) 500 MG tablet, Take 1 tablet (500 mg total) by mouth 2 (two) times daily with meals. (Patient not taking: Reported on 12/16/2019), Disp: 30 tablet, Rfl: 0    ranitidine (ZANTAC) 150 MG tablet, Take 1 tablet (150 mg total) by mouth 2 (two) times daily. (Patient not taking: Reported on 12/22/2020), Disp: 60 tablet, Rfl: 0    sildenafil (VIAGRA) 100 MG tablet, Take 1 tablet (100 mg total) by mouth daily as needed for Erectile Dysfunction., Disp: 15 tablet, Rfl: 11    traZODone (DESYREL) 100 MG tablet, Take 1 tablet (100 mg total) by mouth every evening. (Patient not taking: Reported on 12/16/2019), Disp: 30 tablet, Rfl: 0     Review of patient's allergies indicates:  No Known Allergies      Past Medical History:   Diagnosis Date    Coronary artery disease     Emphysema of lung     Hyperlipidemia     Hypertension        Past Surgical History:   Procedure Laterality Date    CARDIAC CATHETERIZATION  2004    x2    HIP SURGERY         Family History   Problem Relation Age of Onset    Heart attack Mother     Heart disease Mother     Hypertension Mother        Social History     Socioeconomic History    Marital status: Single     Spouse name: Not on file    Number of children: Not on file    Years of education: Not on file    Highest education level: Not on  file   Occupational History    Not on file   Social Needs    Financial resource strain: Not on file    Food insecurity     Worry: Not on file     Inability: Not on file    Transportation needs     Medical: Not on file     Non-medical: Not on file   Tobacco Use    Smoking status: Former Smoker     Quit date: 1993     Years since quittin.9   Substance and Sexual Activity    Alcohol use: Yes    Drug use: No    Sexual activity: Not on file   Lifestyle    Physical activity     Days per week: Not on file     Minutes per session: Not on file    Stress: Not on file   Relationships    Social connections     Talks on phone: Not on file     Gets together: Not on file     Attends [employer]an service: Not on file     Active member of club or organization: Not on file     Attends meetings of clubs or organizations: Not on file     Relationship status: Not on file   Other Topics Concern    Not on file   Social History Narrative    Not on file           Old medical records.    There were no vitals filed for this visit.           The patient was given open opportunity to ask questions and/or express concerns about treatment plan.   All questions/concerns were discussed.   Driving precautions were provided.     Two patient identifiers used prior to evaluation.    Thank you for referring Tee Smith for evaluation.             Past Medical History:   Diagnosis Date    Coronary artery disease     Emphysema of lung     Hyperlipidemia     Hypertension      Past Surgical History:   Procedure Laterality Date    CARDIAC CATHETERIZATION  2004    x2    HIP SURGERY       Family History   Problem Relation Age of Onset    Heart attack Mother     Heart disease Mother     Hypertension Mother      Social History     Socioeconomic History    Marital status: Single     Spouse name: Not on file    Number of children: Not on file    Years of education: Not on file    Highest education level: Not on file    Occupational History    Not on file   Social Needs    Financial resource strain: Not on file    Food insecurity     Worry: Not on file     Inability: Not on file    Transportation needs     Medical: Not on file     Non-medical: Not on file   Tobacco Use    Smoking status: Former Smoker     Quit date: 1993     Years since quittin.9   Substance and Sexual Activity    Alcohol use: Yes    Drug use: No    Sexual activity: Not on file   Lifestyle    Physical activity     Days per week: Not on file     Minutes per session: Not on file    Stress: Not on file   Relationships    Social connections     Talks on phone: Not on file     Gets together: Not on file     Attends Restorationism service: Not on file     Active member of club or organization: Not on file     Attends meetings of clubs or organizations: Not on file     Relationship status: Not on file   Other Topics Concern    Not on file   Social History Narrative    Not on file       Current Outpatient Medications   Medication Sig Dispense Refill    atorvastatin (LIPITOR) 80 MG tablet Take 1 tablet (80 mg total) by mouth nightly. 30 tablet 0    cyproheptadine (PERIACTIN) 4 mg tablet Take 1 tablet (4 mg total) by mouth 3 (three) times daily. 90 tablet 0    hydroCHLOROthiazide (MICROZIDE) 12.5 mg capsule Take 1 capsule (12.5 mg total) by mouth once daily. 30 capsule 0    hydrOXYzine HCl (ATARAX) 25 MG tablet Take 1 tablet (25 mg total) by mouth 4 (four) times daily as needed (cough). 15 tablet 0    lisinopriL (PRINIVIL,ZESTRIL) 40 MG tablet Take 1 tablet (40 mg total) by mouth once daily. 30 tablet 0    metoprolol tartrate (LOPRESSOR) 50 MG tablet Take 1 tablet (50 mg total) by mouth 2 (two) times daily. 60 tablet 11    naproxen (NAPROSYN) 500 MG tablet Take 1 tablet (500 mg total) by mouth 2 (two) times daily with meals. (Patient not taking: Reported on 2019) 30 tablet 0    ranitidine (ZANTAC) 150 MG tablet Take 1 tablet (150 mg total) by  mouth 2 (two) times daily. (Patient not taking: Reported on 12/22/2020) 60 tablet 0    sildenafil (VIAGRA) 100 MG tablet Take 1 tablet (100 mg total) by mouth daily as needed for Erectile Dysfunction. 15 tablet 11    traZODone (DESYREL) 100 MG tablet Take 1 tablet (100 mg total) by mouth every evening. (Patient not taking: Reported on 12/16/2019) 30 tablet 0     No current facility-administered medications for this visit.      Review of patient's allergies indicates:  No Known Allergies    Review of Systems    Objective:      There were no vitals filed for this visit.  Physical Exam    Lab Review:   BMP:   Lab Results   Component Value Date    GLU 85 12/16/2019     12/16/2019    K 4.2 12/16/2019     12/16/2019    CO2 22 (L) 12/16/2019    BUN 15 12/16/2019    CREATININE 1.3 12/16/2019    CALCIUM 9.8 12/16/2019     ABGs: No results found for: PH, PO2, PCO2  Diagnostics Review: X-Ray: Reviewed  Echo: Reviewed     Assessment:       1. Essential hypertension    2. Snoring    3. Panlobular emphysema    4. Hyperlipidemia LDL goal < 130    5. RBBB (right bundle branch block with left anterior fascicular block)    6. CAD S/P percutaneous coronary angioplasty        Plan:       Due to listed symptoms, a polysomnogram is recommended and ordered.   Description of procedure given to patient.   If significant Obstructive Sleep Apnea (KIRA) is found during the initial portion of the study, therapy will be initiated with nasal Continuous Positive Airway Pressure (CPAP).   Goals of therapy were discussed, alternative treatments listed and patient agrees to this form of therapy if indicated.   The pathophysiology of KIRA was discussed.   The effects of KIRA on patient's co-morbid conditions and the increased morbidity and/or mortality associated with this condition were reviewed.   The patient was given open opportunity to ask questions and/or express concerns about treatment plan.   All questions/concerns were discussed.    Driving precautions were provided.       Thank you for referring Tee Mtz for evaluation.       21-minute visit. >50% spent counseling patient and coordination of care.

## 2020-12-23 NOTE — LETTER
December 23, 2020      Jj Montgomery MD  [address]  [address]  [facility] [address]           [id number]  [address]  [facility] [address]  Phone: [phone]          Patient: Tee Mtz   MR Number: [id number]   YOB: 1955   Date of Visit: 12/23/2020       Dear Dr. Jj Montgomery:    Thank you for referring Tee Mtz to me for evaluation. Attached you will find relevant portions of my assessment and plan of care.    If you have questions, please do not hesitate to call me. I look forward to following Tee Mtz along with you.    Sincerely,    Fabienne Cardona MD    Enclosure  CC:  No Recipients    If you would like to receive this communication electronically, please contact [email or web address] or [phone] to request more information on [employer] Link access.    For providers and/or their staff who would like to refer a patient to [facility], please contact us through our one-stop-shop provider referral line, [facility], at [phone].    If you feel you have received this communication in error or would no longer like to receive these types of communications, please e-mail [email or web address]

## 2020-12-28 DIAGNOSIS — E78.5 HYPERLIPIDEMIA LDL GOAL <100: ICD-10-CM

## 2020-12-28 DIAGNOSIS — I10 ESSENTIAL HYPERTENSION: ICD-10-CM

## 2020-12-28 RX ORDER — METOPROLOL TARTRATE 50 MG/1
50 TABLET ORAL 2 TIMES DAILY
Qty: 180 TABLET | Refills: 2 | Status: SHIPPED | OUTPATIENT
Start: 2020-12-28 | End: 2021-09-23

## 2020-12-28 RX ORDER — LISINOPRIL 40 MG/1
40 TABLET ORAL DAILY
Qty: 90 TABLET | Refills: 2 | Status: SHIPPED | OUTPATIENT
Start: 2020-12-28 | End: 2021-09-23

## 2020-12-28 RX ORDER — ATORVASTATIN CALCIUM 80 MG/1
80 TABLET, FILM COATED ORAL NIGHTLY
Qty: 90 TABLET | Refills: 2 | Status: SHIPPED | OUTPATIENT
Start: 2020-12-28 | End: 2021-09-23

## 2020-12-28 RX ORDER — CYPROHEPTADINE HYDROCHLORIDE 4 MG/1
4 TABLET ORAL 3 TIMES DAILY
Qty: 90 TABLET | Refills: 0 | Status: SHIPPED | OUTPATIENT
Start: 2020-12-28 | End: 2022-03-25 | Stop reason: SDUPTHER

## 2020-12-28 RX ORDER — HYDROCHLOROTHIAZIDE 12.5 MG/1
12.5 CAPSULE ORAL DAILY
Qty: 90 CAPSULE | Refills: 0 | Status: SHIPPED | OUTPATIENT
Start: 2020-12-28 | End: 2022-05-18 | Stop reason: SDUPTHER

## 2021-01-13 DIAGNOSIS — Z12.11 COLON CANCER SCREENING: ICD-10-CM

## 2021-04-21 ENCOUNTER — TELEPHONE (OUTPATIENT)
Dept: OPTOMETRY | Facility: CLINIC | Age: 66
End: 2021-04-21

## 2021-06-15 ENCOUNTER — PATIENT OUTREACH (OUTPATIENT)
Dept: ADMINISTRATIVE | Facility: OTHER | Age: 66
End: 2021-06-15

## 2021-07-08 ENCOUNTER — HOSPITAL ENCOUNTER (EMERGENCY)
Facility: OTHER | Age: 66
Discharge: HOME | End: 2021-07-09
Attending: EMERGENCY MEDICINE
Payer: MEDICARE

## 2021-07-08 ENCOUNTER — TELEPHONE (OUTPATIENT)
Dept: INTERNAL MEDICINE | Facility: CLINIC | Age: 66
End: 2021-07-08

## 2021-07-08 DIAGNOSIS — I10 HYPERTENSION: ICD-10-CM

## 2021-07-08 DIAGNOSIS — I10 HYPERTENSION, UNSPECIFIED TYPE: Primary | ICD-10-CM

## 2021-07-08 LAB
ALBUMIN SERPL BCP-MCNC: 4 G/DL (ref 3.5–5.2)
ALP SERPL-CCNC: 51 U/L (ref 55–135)
ALT SERPL W/O P-5'-P-CCNC: 22 U/L (ref 10–44)
ANION GAP SERPL CALC-SCNC: 10 MMOL/L (ref 8–16)
AST SERPL-CCNC: 22 U/L (ref 10–40)
BACTERIA #/AREA URNS HPF: ABNORMAL /HPF
BASOPHILS # BLD AUTO: 0.07 K/UL (ref 0–0.2)
BASOPHILS NFR BLD: 0.7 % (ref 0–1.9)
BILIRUB SERPL-MCNC: 1.1 MG/DL (ref 0.1–1)
BILIRUB UR QL STRIP: NEGATIVE
BNP SERPL-MCNC: 193 PG/ML (ref 0–99)
BUN SERPL-MCNC: 14 MG/DL (ref 8–23)
CALCIUM SERPL-MCNC: 9.5 MG/DL (ref 8.7–10.5)
CHLORIDE SERPL-SCNC: 109 MMOL/L (ref 95–110)
CLARITY UR: ABNORMAL
CO2 SERPL-SCNC: 21 MMOL/L (ref 23–29)
COLOR UR: YELLOW
CREAT SERPL-MCNC: 1.1 MG/DL (ref 0.5–1.4)
DIFFERENTIAL METHOD: ABNORMAL
EOSINOPHIL # BLD AUTO: 0.1 K/UL (ref 0–0.5)
EOSINOPHIL NFR BLD: 0.7 % (ref 0–8)
ERYTHROCYTE [DISTWIDTH] IN BLOOD BY AUTOMATED COUNT: 16.4 % (ref 11.5–14.5)
EST. GFR  (AFRICAN AMERICAN): >60 ML/MIN/1.73 M^2
EST. GFR  (NON AFRICAN AMERICAN): >60 ML/MIN/1.73 M^2
GLUCOSE SERPL-MCNC: 79 MG/DL (ref 70–110)
GLUCOSE UR QL STRIP: NEGATIVE
HCT VFR BLD AUTO: 46.7 % (ref 40–54)
HGB BLD-MCNC: 15 G/DL (ref 14–18)
HGB UR QL STRIP: ABNORMAL
IMM GRANULOCYTES # BLD AUTO: 0.03 K/UL (ref 0–0.04)
IMM GRANULOCYTES NFR BLD AUTO: 0.3 % (ref 0–0.5)
KETONES UR QL STRIP: NEGATIVE
LEUKOCYTE ESTERASE UR QL STRIP: ABNORMAL
LYMPHOCYTES # BLD AUTO: 3.6 K/UL (ref 1–4.8)
LYMPHOCYTES NFR BLD: 33.5 % (ref 18–48)
MCH RBC QN AUTO: 23.5 PG (ref 27–31)
MCHC RBC AUTO-ENTMCNC: 32.1 G/DL (ref 32–36)
MCV RBC AUTO: 73 FL (ref 82–98)
MICROSCOPIC COMMENT: ABNORMAL
MONOCYTES # BLD AUTO: 1.3 K/UL (ref 0.3–1)
MONOCYTES NFR BLD: 12.1 % (ref 4–15)
NEUTROPHILS # BLD AUTO: 5.7 K/UL (ref 1.8–7.7)
NEUTROPHILS NFR BLD: 52.7 % (ref 38–73)
NITRITE UR QL STRIP: POSITIVE
NRBC BLD-RTO: 0 /100 WBC
PH UR STRIP: 7 [PH] (ref 5–8)
PLATELET # BLD AUTO: 173 K/UL (ref 150–450)
PMV BLD AUTO: 10.7 FL (ref 9.2–12.9)
POTASSIUM SERPL-SCNC: 3.9 MMOL/L (ref 3.5–5.1)
PROT SERPL-MCNC: 7.8 G/DL (ref 6–8.4)
PROT UR QL STRIP: ABNORMAL
RBC # BLD AUTO: 6.37 M/UL (ref 4.6–6.2)
RBC #/AREA URNS HPF: 8 /HPF (ref 0–4)
SODIUM SERPL-SCNC: 140 MMOL/L (ref 136–145)
SP GR UR STRIP: 1.02 (ref 1–1.03)
SQUAMOUS #/AREA URNS HPF: 5 /HPF
TROPONIN I SERPL DL<=0.01 NG/ML-MCNC: 0.01 NG/ML (ref 0–0.03)
URN SPEC COLLECT METH UR: ABNORMAL
UROBILINOGEN UR STRIP-ACNC: NEGATIVE EU/DL
WBC # BLD AUTO: 10.81 K/UL (ref 3.9–12.7)
WBC #/AREA URNS HPF: 30 /HPF (ref 0–5)

## 2021-07-08 PROCEDURE — 87086 URINE CULTURE/COLONY COUNT: CPT | Performed by: NURSE PRACTITIONER

## 2021-07-08 PROCEDURE — 93010 ELECTROCARDIOGRAM REPORT: CPT | Mod: ,,, | Performed by: INTERNAL MEDICINE

## 2021-07-08 PROCEDURE — 93010 EKG 12-LEAD: ICD-10-PCS | Mod: ,,, | Performed by: INTERNAL MEDICINE

## 2021-07-08 PROCEDURE — 96375 TX/PRO/DX INJ NEW DRUG ADDON: CPT

## 2021-07-08 PROCEDURE — 83880 ASSAY OF NATRIURETIC PEPTIDE: CPT | Performed by: NURSE PRACTITIONER

## 2021-07-08 PROCEDURE — 84484 ASSAY OF TROPONIN QUANT: CPT | Performed by: NURSE PRACTITIONER

## 2021-07-08 PROCEDURE — 25000003 PHARM REV CODE 250: Performed by: EMERGENCY MEDICINE

## 2021-07-08 PROCEDURE — 87186 SC STD MICRODIL/AGAR DIL: CPT | Performed by: NURSE PRACTITIONER

## 2021-07-08 PROCEDURE — 87088 URINE BACTERIA CULTURE: CPT | Performed by: NURSE PRACTITIONER

## 2021-07-08 PROCEDURE — 87077 CULTURE AEROBIC IDENTIFY: CPT | Performed by: NURSE PRACTITIONER

## 2021-07-08 PROCEDURE — 99285 EMERGENCY DEPT VISIT HI MDM: CPT | Mod: 25

## 2021-07-08 PROCEDURE — 96374 THER/PROPH/DIAG INJ IV PUSH: CPT

## 2021-07-08 PROCEDURE — 63600175 PHARM REV CODE 636 W HCPCS: Performed by: EMERGENCY MEDICINE

## 2021-07-08 PROCEDURE — 93005 ELECTROCARDIOGRAM TRACING: CPT

## 2021-07-08 PROCEDURE — 81000 URINALYSIS NONAUTO W/SCOPE: CPT | Performed by: NURSE PRACTITIONER

## 2021-07-08 PROCEDURE — 96361 HYDRATE IV INFUSION ADD-ON: CPT

## 2021-07-08 PROCEDURE — 85025 COMPLETE CBC W/AUTO DIFF WBC: CPT | Performed by: NURSE PRACTITIONER

## 2021-07-08 PROCEDURE — 80053 COMPREHEN METABOLIC PANEL: CPT | Performed by: NURSE PRACTITIONER

## 2021-07-08 RX ORDER — CLONIDINE HYDROCHLORIDE 0.1 MG/1
0.1 TABLET ORAL
Status: COMPLETED | OUTPATIENT
Start: 2021-07-08 | End: 2021-07-08

## 2021-07-08 RX ORDER — PROCHLORPERAZINE EDISYLATE 5 MG/ML
5 INJECTION INTRAMUSCULAR; INTRAVENOUS
Status: COMPLETED | OUTPATIENT
Start: 2021-07-08 | End: 2021-07-08

## 2021-07-08 RX ORDER — ASPIRIN 325 MG
325 TABLET ORAL
Status: DISCONTINUED | OUTPATIENT
Start: 2021-07-08 | End: 2021-07-08

## 2021-07-08 RX ORDER — KETOROLAC TROMETHAMINE 30 MG/ML
15 INJECTION, SOLUTION INTRAMUSCULAR; INTRAVENOUS
Status: COMPLETED | OUTPATIENT
Start: 2021-07-08 | End: 2021-07-08

## 2021-07-08 RX ADMIN — SODIUM CHLORIDE 1000 ML: 0.9 INJECTION, SOLUTION INTRAVENOUS at 08:07

## 2021-07-08 RX ADMIN — KETOROLAC TROMETHAMINE 15 MG: 30 INJECTION, SOLUTION INTRAMUSCULAR; INTRAVENOUS at 08:07

## 2021-07-08 RX ADMIN — PROCHLORPERAZINE EDISYLATE 5 MG: 5 INJECTION INTRAMUSCULAR; INTRAVENOUS at 08:07

## 2021-07-08 RX ADMIN — CLONIDINE HYDROCHLORIDE 0.1 MG: 0.1 TABLET ORAL at 10:07

## 2021-07-09 VITALS
DIASTOLIC BLOOD PRESSURE: 88 MMHG | OXYGEN SATURATION: 96 % | SYSTOLIC BLOOD PRESSURE: 192 MMHG | TEMPERATURE: 98 F | WEIGHT: 190 LBS | HEART RATE: 70 BPM | RESPIRATION RATE: 23 BRPM | BODY MASS INDEX: 27.26 KG/M2

## 2021-07-12 LAB — BACTERIA UR CULT: ABNORMAL

## 2021-07-23 ENCOUNTER — TELEPHONE (OUTPATIENT)
Dept: EMERGENCY MEDICINE | Facility: OTHER | Age: 66
End: 2021-07-23

## 2021-11-28 ENCOUNTER — HOSPITAL ENCOUNTER (EMERGENCY)
Facility: OTHER | Age: 66
Discharge: HOME | End: 2021-11-28
Attending: EMERGENCY MEDICINE
Payer: MEDICARE

## 2021-11-28 VITALS
TEMPERATURE: 98 F | DIASTOLIC BLOOD PRESSURE: 89 MMHG | RESPIRATION RATE: 16 BRPM | OXYGEN SATURATION: 99 % | HEART RATE: 74 BPM | SYSTOLIC BLOOD PRESSURE: 172 MMHG

## 2021-11-28 DIAGNOSIS — G43.809 OTHER MIGRAINE WITHOUT STATUS MIGRAINOSUS, NOT INTRACTABLE: Primary | ICD-10-CM

## 2021-11-28 DIAGNOSIS — I10 HYPERTENSION, UNSPECIFIED TYPE: ICD-10-CM

## 2021-11-28 PROCEDURE — 25000003 PHARM REV CODE 250: Performed by: EMERGENCY MEDICINE

## 2021-11-28 PROCEDURE — 99283 EMERGENCY DEPT VISIT LOW MDM: CPT | Mod: 25

## 2021-11-28 RX ORDER — BUTALBITAL, ACETAMINOPHEN AND CAFFEINE 50; 325; 40 MG/1; MG/1; MG/1
1 TABLET ORAL EVERY 4 HOURS PRN
Qty: 13 TABLET | Refills: 0 | Status: SHIPPED | OUTPATIENT
Start: 2021-11-28 | End: 2021-12-28

## 2021-11-28 RX ORDER — KETOROLAC TROMETHAMINE 10 MG/1
10 TABLET, FILM COATED ORAL
Status: COMPLETED | OUTPATIENT
Start: 2021-11-28 | End: 2021-11-28

## 2021-11-28 RX ORDER — AMLODIPINE BESYLATE 5 MG/1
10 TABLET ORAL
Status: COMPLETED | OUTPATIENT
Start: 2021-11-28 | End: 2021-11-28

## 2021-11-28 RX ADMIN — KETOROLAC TROMETHAMINE 10 MG: 10 TABLET, FILM COATED ORAL at 06:11

## 2021-11-28 RX ADMIN — AMLODIPINE BESYLATE 10 MG: 5 TABLET ORAL at 06:11

## 2022-01-04 ENCOUNTER — PES CALL (OUTPATIENT)
Dept: ADMINISTRATIVE | Facility: CLINIC | Age: 67
End: 2022-01-04
Payer: MEDICARE

## 2022-02-09 ENCOUNTER — TELEPHONE (OUTPATIENT)
Dept: INTERNAL MEDICINE | Facility: CLINIC | Age: 67
End: 2022-02-09
Payer: MEDICARE

## 2022-02-09 NOTE — LETTER
February 9, 2022    Tee Mesaronaldo  [address] [place] [address]             [facility]  [address]  [facility] [address]  Phone: [phone]  Fax: [phone] Bradford Oliveira!      We are happy to announce our clinics are open and ready to care for you!  [facility] is committed to supporting your overall health and would like to send a friendly reminder that you may be currently overdue for your annual.     The best time to schedule your visit with Dr.Christopher NIK Montgomery is right after reading this message. It will only take two minutes -- click the Appointments button within [email or web address], or call our office at [phone].     Currently, we have Dr.Christopher NIK Montgomery listed as your primary care provider. If this is incorrect, please let us know by emailing or contacting our office at [phone] so we can update our records and notify your insurance company.     We look forward to seeing you soon and appreciate the opportunity to serve you.     Sincerely,     Your [facility] Team    Jj Montgomery MD

## 2022-03-09 ENCOUNTER — HOSPITAL ENCOUNTER (EMERGENCY)
Facility: OTHER | Age: 67
Discharge: HOME | End: 2022-03-09
Attending: EMERGENCY MEDICINE
Payer: MEDICARE

## 2022-03-09 VITALS
TEMPERATURE: 98 F | RESPIRATION RATE: 15 BRPM | SYSTOLIC BLOOD PRESSURE: 194 MMHG | BODY MASS INDEX: 25.77 KG/M2 | DIASTOLIC BLOOD PRESSURE: 100 MMHG | HEIGHT: 70 IN | OXYGEN SATURATION: 100 % | HEART RATE: 65 BPM | WEIGHT: 180 LBS

## 2022-03-09 DIAGNOSIS — I10 HYPERTENSION: ICD-10-CM

## 2022-03-09 DIAGNOSIS — N39.0 URINARY TRACT INFECTION WITH HEMATURIA, SITE UNSPECIFIED: ICD-10-CM

## 2022-03-09 DIAGNOSIS — R31.9 URINARY TRACT INFECTION WITH HEMATURIA, SITE UNSPECIFIED: ICD-10-CM

## 2022-03-09 DIAGNOSIS — S09.90XA INJURY OF HEAD, INITIAL ENCOUNTER: Primary | ICD-10-CM

## 2022-03-09 DIAGNOSIS — S06.0X0A CONCUSSION WITHOUT LOSS OF CONSCIOUSNESS, INITIAL ENCOUNTER: ICD-10-CM

## 2022-03-09 LAB
ALBUMIN SERPL BCP-MCNC: 4 G/DL (ref 3.5–5.2)
ALP SERPL-CCNC: 51 U/L (ref 55–135)
ALT SERPL W/O P-5'-P-CCNC: 19 U/L (ref 10–44)
ANION GAP SERPL CALC-SCNC: 10 MMOL/L (ref 8–16)
AST SERPL-CCNC: 30 U/L (ref 10–40)
BACTERIA #/AREA URNS HPF: ABNORMAL /HPF
BASOPHILS # BLD AUTO: 0.06 K/UL (ref 0–0.2)
BASOPHILS NFR BLD: 0.8 % (ref 0–1.9)
BILIRUB SERPL-MCNC: 1 MG/DL (ref 0.1–1)
BILIRUB UR QL STRIP: NEGATIVE
BUN SERPL-MCNC: 15 MG/DL (ref 8–23)
CALCIUM SERPL-MCNC: 9.2 MG/DL (ref 8.7–10.5)
CHLORIDE SERPL-SCNC: 108 MMOL/L (ref 95–110)
CLARITY UR: CLEAR
CO2 SERPL-SCNC: 22 MMOL/L (ref 23–29)
COLOR UR: YELLOW
CREAT SERPL-MCNC: 1 MG/DL (ref 0.5–1.4)
DIFFERENTIAL METHOD: ABNORMAL
EOSINOPHIL # BLD AUTO: 0.2 K/UL (ref 0–0.5)
EOSINOPHIL NFR BLD: 2.3 % (ref 0–8)
ERYTHROCYTE [DISTWIDTH] IN BLOOD BY AUTOMATED COUNT: 18.1 % (ref 11.5–14.5)
EST. GFR  (AFRICAN AMERICAN): >60 ML/MIN/1.73 M^2
EST. GFR  (NON AFRICAN AMERICAN): >60 ML/MIN/1.73 M^2
GLUCOSE SERPL-MCNC: 86 MG/DL (ref 70–110)
GLUCOSE UR QL STRIP: NEGATIVE
HCT VFR BLD AUTO: 48.4 % (ref 40–54)
HGB BLD-MCNC: 15.7 G/DL (ref 14–18)
HGB UR QL STRIP: ABNORMAL
IMM GRANULOCYTES # BLD AUTO: 0.02 K/UL (ref 0–0.04)
IMM GRANULOCYTES NFR BLD AUTO: 0.3 % (ref 0–0.5)
KETONES UR QL STRIP: NEGATIVE
LEUKOCYTE ESTERASE UR QL STRIP: ABNORMAL
LYMPHOCYTES # BLD AUTO: 3.3 K/UL (ref 1–4.8)
LYMPHOCYTES NFR BLD: 42.8 % (ref 18–48)
MCH RBC QN AUTO: 24 PG (ref 27–31)
MCHC RBC AUTO-ENTMCNC: 32.4 G/DL (ref 32–36)
MCV RBC AUTO: 74 FL (ref 82–98)
MICROSCOPIC COMMENT: ABNORMAL
MONOCYTES # BLD AUTO: 0.9 K/UL (ref 0.3–1)
MONOCYTES NFR BLD: 11.8 % (ref 4–15)
NEUTROPHILS # BLD AUTO: 3.3 K/UL (ref 1.8–7.7)
NEUTROPHILS NFR BLD: 42 % (ref 38–73)
NITRITE UR QL STRIP: POSITIVE
NRBC BLD-RTO: 0 /100 WBC
PH UR STRIP: 6 [PH] (ref 5–8)
PLATELET # BLD AUTO: 163 K/UL (ref 150–450)
PMV BLD AUTO: ABNORMAL FL (ref 9.2–12.9)
POTASSIUM SERPL-SCNC: 5.2 MMOL/L (ref 3.5–5.1)
PROT SERPL-MCNC: 7.9 G/DL (ref 6–8.4)
PROT UR QL STRIP: NEGATIVE
RBC # BLD AUTO: 6.55 M/UL (ref 4.6–6.2)
RBC #/AREA URNS HPF: 1 /HPF (ref 0–4)
SODIUM SERPL-SCNC: 140 MMOL/L (ref 136–145)
SP GR UR STRIP: 1.01 (ref 1–1.03)
SQUAMOUS #/AREA URNS HPF: 1 /HPF
URN SPEC COLLECT METH UR: ABNORMAL
UROBILINOGEN UR STRIP-ACNC: NEGATIVE EU/DL
WBC # BLD AUTO: 7.73 K/UL (ref 3.9–12.7)
WBC #/AREA URNS HPF: 20 /HPF (ref 0–5)

## 2022-03-09 PROCEDURE — 87591 N.GONORRHOEAE DNA AMP PROB: CPT | Performed by: PHYSICIAN ASSISTANT

## 2022-03-09 PROCEDURE — 87186 SC STD MICRODIL/AGAR DIL: CPT | Performed by: PHYSICIAN ASSISTANT

## 2022-03-09 PROCEDURE — 63600175 PHARM REV CODE 636 W HCPCS: Performed by: PHYSICIAN ASSISTANT

## 2022-03-09 PROCEDURE — 25000003 PHARM REV CODE 250: Performed by: PHYSICIAN ASSISTANT

## 2022-03-09 PROCEDURE — 87491 CHLMYD TRACH DNA AMP PROBE: CPT | Performed by: PHYSICIAN ASSISTANT

## 2022-03-09 PROCEDURE — 85025 COMPLETE CBC W/AUTO DIFF WBC: CPT | Performed by: PHYSICIAN ASSISTANT

## 2022-03-09 PROCEDURE — 87088 URINE BACTERIA CULTURE: CPT | Performed by: PHYSICIAN ASSISTANT

## 2022-03-09 PROCEDURE — 93010 ELECTROCARDIOGRAM REPORT: CPT | Mod: ,,, | Performed by: INTERNAL MEDICINE

## 2022-03-09 PROCEDURE — 96374 THER/PROPH/DIAG INJ IV PUSH: CPT

## 2022-03-09 PROCEDURE — 87086 URINE CULTURE/COLONY COUNT: CPT | Performed by: PHYSICIAN ASSISTANT

## 2022-03-09 PROCEDURE — 81000 URINALYSIS NONAUTO W/SCOPE: CPT | Performed by: PHYSICIAN ASSISTANT

## 2022-03-09 PROCEDURE — 96376 TX/PRO/DX INJ SAME DRUG ADON: CPT

## 2022-03-09 PROCEDURE — 87077 CULTURE AEROBIC IDENTIFY: CPT | Performed by: PHYSICIAN ASSISTANT

## 2022-03-09 PROCEDURE — 93005 ELECTROCARDIOGRAM TRACING: CPT

## 2022-03-09 PROCEDURE — 80053 COMPREHEN METABOLIC PANEL: CPT | Performed by: PHYSICIAN ASSISTANT

## 2022-03-09 PROCEDURE — 99285 EMERGENCY DEPT VISIT HI MDM: CPT | Mod: 25

## 2022-03-09 PROCEDURE — 93010 EKG 12-LEAD: ICD-10-PCS | Mod: ,,, | Performed by: INTERNAL MEDICINE

## 2022-03-09 PROCEDURE — 63600175 PHARM REV CODE 636 W HCPCS: Performed by: EMERGENCY MEDICINE

## 2022-03-09 RX ORDER — LIDOCAINE 50 MG/G
1 PATCH TOPICAL
Status: DISCONTINUED | OUTPATIENT
Start: 2022-03-09 | End: 2022-03-09 | Stop reason: HOSPADM

## 2022-03-09 RX ORDER — IBUPROFEN 600 MG/1
600 TABLET ORAL EVERY 6 HOURS PRN
Qty: 20 TABLET | Refills: 0 | OUTPATIENT
Start: 2022-03-09 | End: 2022-06-10

## 2022-03-09 RX ORDER — HYDRALAZINE HYDROCHLORIDE 20 MG/ML
10 INJECTION INTRAMUSCULAR; INTRAVENOUS
Status: COMPLETED | OUTPATIENT
Start: 2022-03-09 | End: 2022-03-09

## 2022-03-09 RX ORDER — AMLODIPINE BESYLATE 5 MG/1
10 TABLET ORAL DAILY
Qty: 30 TABLET | Refills: 0 | Status: SHIPPED | OUTPATIENT
Start: 2022-03-09 | End: 2022-03-25 | Stop reason: SDUPTHER

## 2022-03-09 RX ORDER — HYDROCODONE BITARTRATE AND ACETAMINOPHEN 5; 325 MG/1; MG/1
1 TABLET ORAL
Status: DISCONTINUED | OUTPATIENT
Start: 2022-03-09 | End: 2022-03-09 | Stop reason: HOSPADM

## 2022-03-09 RX ORDER — METHOCARBAMOL 750 MG/1
1500 TABLET, FILM COATED ORAL ONCE
Status: COMPLETED | OUTPATIENT
Start: 2022-03-09 | End: 2022-03-09

## 2022-03-09 RX ORDER — CEPHALEXIN 500 MG/1
500 CAPSULE ORAL 4 TIMES DAILY
Qty: 28 CAPSULE | Refills: 0 | Status: SHIPPED | OUTPATIENT
Start: 2022-03-09 | End: 2022-03-16

## 2022-03-09 RX ORDER — METHOCARBAMOL 750 MG/1
1500 TABLET, FILM COATED ORAL 3 TIMES DAILY
Qty: 30 TABLET | Refills: 0 | Status: SHIPPED | OUTPATIENT
Start: 2022-03-09 | End: 2022-03-14

## 2022-03-09 RX ORDER — HYDROCHLOROTHIAZIDE 12.5 MG/1
12.5 TABLET ORAL
Status: COMPLETED | OUTPATIENT
Start: 2022-03-09 | End: 2022-03-09

## 2022-03-09 RX ORDER — AMLODIPINE BESYLATE 5 MG/1
10 TABLET ORAL
Status: COMPLETED | OUTPATIENT
Start: 2022-03-09 | End: 2022-03-09

## 2022-03-09 RX ORDER — LIDOCAINE 50 MG/G
1 PATCH TOPICAL DAILY
Qty: 15 PATCH | Refills: 0 | Status: SHIPPED | OUTPATIENT
Start: 2022-03-09

## 2022-03-09 RX ORDER — ACETAMINOPHEN 500 MG
1000 TABLET ORAL
Status: COMPLETED | OUTPATIENT
Start: 2022-03-09 | End: 2022-03-09

## 2022-03-09 RX ADMIN — LIDOCAINE 5% 1 PATCH: 700 PATCH TOPICAL at 03:03

## 2022-03-09 RX ADMIN — HYDROCHLOROTHIAZIDE 12.5 MG: 12.5 TABLET ORAL at 02:03

## 2022-03-09 RX ADMIN — HYDRALAZINE HYDROCHLORIDE 10 MG: 20 INJECTION, SOLUTION INTRAMUSCULAR; INTRAVENOUS at 06:03

## 2022-03-09 RX ADMIN — METHOCARBAMOL 1500 MG: 750 TABLET ORAL at 02:03

## 2022-03-09 RX ADMIN — ACETAMINOPHEN 1000 MG: 500 TABLET, FILM COATED ORAL at 01:03

## 2022-03-09 RX ADMIN — AMLODIPINE BESYLATE 10 MG: 5 TABLET ORAL at 05:03

## 2022-03-09 RX ADMIN — HYDRALAZINE HYDROCHLORIDE 10 MG: 20 INJECTION, SOLUTION INTRAMUSCULAR; INTRAVENOUS at 04:03

## 2022-03-09 NOTE — Clinical Note
"Tee"Ziyad Mtz was seen and treated in our emergency department on 3/9/2022.  He may return to work on 03/11/2022.       If you have any questions or concerns, please don't hesitate to call.      Kiera Tong MD"

## 2022-03-09 NOTE — ED PROVIDER NOTES
Encounter Date: 3/9/2022       History     Chief Complaint   Patient presents with    Head Injury     Pt had a large sign fall on his head and neck last night, c/o pain, took Aleve this AM with some effect. No LOC. Stated dizziness at the time, now resolved. Denies AC therapy.      66 y.o. male with PMH of HTN, HLD, CAD and chronic pain dense to the ED for evaluation of headache and neck pain.  Patient states that he was at restaurant last night when a large sign fell and struck him on the head.  It did cause him to fall forward however denies any secondary impact.  He complains of continued headache and neck pain since this incident.  He reports no LOC however did report some dizziness.  He states that he continues with headache and dizziness.  He did dry leave this morning with only modest improvement symptoms.  Denies any vision changes, numbness, tingling, weakness, chest pain, shortness of breath or additional acute areas of injury.    The history is provided by the patient.     Review of patient's allergies indicates:  No Known Allergies  Past Medical History:   Diagnosis Date    Coronary artery disease     Emphysema of lung     Hyperlipidemia     Hypertension      Past Surgical History:   Procedure Laterality Date    CARDIAC CATHETERIZATION  2004    x2    HIP SURGERY       Family History   Problem Relation Age of Onset    Heart attack Mother     Heart disease Mother     Hypertension Mother      Social History     Tobacco Use    Smoking status: Former Smoker     Quit date: 1993     Years since quittin.2    Smokeless tobacco: Never Used   Substance Use Topics    Alcohol use: Yes     Comment: on weekends    Drug use: Yes     Types: Marijuana     Comment: every now and then     Review of Systems   Constitutional: Negative for chills and fever.   HENT: Negative for facial swelling and sore throat.    Eyes: Negative for visual disturbance.   Respiratory: Negative for shortness of breath.     Cardiovascular: Negative for chest pain.   Gastrointestinal: Negative for nausea and vomiting.   Genitourinary: Negative for dysuria and flank pain.   Musculoskeletal: Positive for neck pain. Negative for arthralgias, back pain, joint swelling, myalgias and neck stiffness.   Skin: Negative for rash.   Neurological: Positive for dizziness and headaches. Negative for syncope and weakness.   Hematological: Does not bruise/bleed easily.   Psychiatric/Behavioral: Negative for confusion.       Physical Exam     Initial Vitals [03/09/22 1045]   BP Pulse Resp Temp SpO2   (!) 173/94 68 18 97.9 °F (36.6 °C) 98 %      MAP       --         Physical Exam    Nursing note and vitals reviewed.  Constitutional: Vital signs are normal. He appears well-developed and well-nourished. He is cooperative.  Non-toxic appearance. He does not appear ill. He appears distressed (due to pain).   HENT:   Head: Normocephalic and atraumatic.   Eyes: Conjunctivae, EOM and lids are normal. Pupils are equal, round, and reactive to light.   Neck: Trachea normal. Neck supple. No stridor present. No tracheal deviation present.   Normal range of motion.  Cardiovascular: Normal rate and regular rhythm.   Pulmonary/Chest: Breath sounds normal. No respiratory distress. He has no wheezes. He has no rhonchi.   Abdominal: Abdomen is soft.   Musculoskeletal:         General: Tenderness present. Normal range of motion.      Cervical back: Normal range of motion and neck supple. Spinous process tenderness and muscular tenderness present.     Neurological: He is alert and oriented to person, place, and time. He has normal strength. No sensory deficit. Coordination and gait normal. GCS score is 15. GCS eye subscore is 4. GCS verbal subscore is 5. GCS motor subscore is 6.   Skin: Skin is warm, dry and intact. No rash noted.   Psychiatric: He has a normal mood and affect. His speech is normal and behavior is normal. Thought content normal.         ED Course    Procedures  Labs Reviewed   URINALYSIS, REFLEX TO URINE CULTURE - Abnormal; Notable for the following components:       Result Value    Occult Blood UA Trace (*)     Nitrite, UA Positive (*)     Leukocytes, UA 1+ (*)     All other components within normal limits    Narrative:     Specimen Source->Urine   CBC W/ AUTO DIFFERENTIAL - Abnormal; Notable for the following components:    RBC 6.55 (*)     MCV 74 (*)     MCH 24.0 (*)     RDW 18.1 (*)     All other components within normal limits   COMPREHENSIVE METABOLIC PANEL - Abnormal; Notable for the following components:    Potassium 5.2 (*)     CO2 22 (*)     Alkaline Phosphatase 51 (*)     All other components within normal limits   URINALYSIS MICROSCOPIC - Abnormal; Notable for the following components:    WBC, UA 20 (*)     Bacteria Many (*)     All other components within normal limits    Narrative:     Specimen Source->Urine   C. TRACHOMATIS/N. GONORRHOEAE BY AMP DNA   CULTURE, URINE   C. TRACHOMATIS/N. GONORRHOEAE BY AMP DNA        ECG Results          EKG 12-lead (Final result)  Result time 03/09/22 17:36:54    Final result by Interface, Lab In [facility] (03/09/22 17:36:54)                 Narrative:    Test Reason : I10,    Vent. Rate : 058 BPM     Atrial Rate : 058 BPM     P-R Int : 182 ms          QRS Dur : 166 ms      QT Int : 438 ms       P-R-T Axes : 078 -63 010 degrees     QTc Int : 429 ms    Sinus bradycardia  Right bundle branch block  Left anterior fascicular block   Bifascicular block   Minimal voltage criteria for LVH, may be normal variant  Abnormal ECG    Confirmed by Azam Ruiz MD ([id number]) on 3/9/2022 5:36:44 PM    Referred By: [email or web address]   SELF           Confirmed By:Azam Ruiz MD                            Imaging Results          CT Cervical Spine Without Contrast (Final result)  Result time 03/09/22 13:16:54    Final result by Jimmy Sullivan DO (03/09/22 13:16:54)                 Impression:      1. No acute intracranial  abnormality.  2. Multilevel degenerative changes of the cervical spine as above, without evidence of an acute fracture or subluxation.  3. Emphysematous changes of the lung apices.      Electronically signed by: Jimmy Sullivan  Date:    03/09/2022  Time:    13:16             Narrative:    EXAMINATION:  CT HEAD WITHOUT CONTRAST; CT CERVICAL SPINE WITHOUT CONTRAST    CLINICAL HISTORY:  Head trauma, minor (Age >= 65y);; Neck trauma (Age >= 65y);    TECHNIQUE:  Low dose axial CT images obtained throughout the head and cervical spine without intravenous contrast. Sagittal and coronal reconstructions were performed.    COMPARISON:  CT head from 07/08/2021.    FINDINGS:  CT head:    Ventricles and sulci are normal in size for age without evidence of hydrocephalus. No extra-axial blood or fluid collections.  There is hypoattenuation in the supratentorial white matter, nonspecific but can be seen with chronic microvascular ischemic changes, similar to prior.  No parenchymal mass, hemorrhage, edema or major vascular distribution infarct.  There are intracranial atherosclerotic calcifications.    No calvarial fracture.  The scalp is unremarkable.  Bilateral paranasal sinuses and mastoid air cells are clear.    CT cervical spine:    Alignment: There is mild retrolisthesis of C6 on C7.  Alignment is otherwise normal.    Vertebrae: No acute fracture or subluxation.  The vertebral body heights are maintained.  There is subchondral cystic change and subchondral sclerosis of the inferior endplate of C6 and the superior endplate of C7.    Discs: There is moderate disc height loss at C6-C7.  Remaining disc heights are maintained.    Degenerative changes: There are posterior disc osteophyte complexes at C5-C6 and C6-C7.  At C5-C6 there is left-sided facet arthropathy resulting in mild neural foraminal narrowing, no spinal canal stenosis.  At C6-C7 there is mild spinal canal stenosis and mild bilateral neural foraminal narrowing.   Remaining levels are unremarkable.    Miscellaneous: The soft tissues of the neck are unremarkable.  There are emphysematous changes of the lung apices.                               CT Head Without Contrast (Final result)  Result time 03/09/22 13:16:54    Final result by Jimmy Sullivan DO (03/09/22 13:16:54)                 Impression:      1. No acute intracranial abnormality.  2. Multilevel degenerative changes of the cervical spine as above, without evidence of an acute fracture or subluxation.  3. Emphysematous changes of the lung apices.      Electronically signed by: Jimmy Sullivan  Date:    03/09/2022  Time:    13:16             Narrative:    EXAMINATION:  CT HEAD WITHOUT CONTRAST; CT CERVICAL SPINE WITHOUT CONTRAST    CLINICAL HISTORY:  Head trauma, minor (Age >= 65y);; Neck trauma (Age >= 65y);    TECHNIQUE:  Low dose axial CT images obtained throughout the head and cervical spine without intravenous contrast. Sagittal and coronal reconstructions were performed.    COMPARISON:  CT head from 07/08/2021.    FINDINGS:  CT head:    Ventricles and sulci are normal in size for age without evidence of hydrocephalus. No extra-axial blood or fluid collections.  There is hypoattenuation in the supratentorial white matter, nonspecific but can be seen with chronic microvascular ischemic changes, similar to prior.  No parenchymal mass, hemorrhage, edema or major vascular distribution infarct.  There are intracranial atherosclerotic calcifications.    No calvarial fracture.  The scalp is unremarkable.  Bilateral paranasal sinuses and mastoid air cells are clear.    CT cervical spine:    Alignment: There is mild retrolisthesis of C6 on C7.  Alignment is otherwise normal.    Vertebrae: No acute fracture or subluxation.  The vertebral body heights are maintained.  There is subchondral cystic change and subchondral sclerosis of the inferior endplate of C6 and the superior endplate of C7.    Discs: There is moderate disc  height loss at C6-C7.  Remaining disc heights are maintained.    Degenerative changes: There are posterior disc osteophyte complexes at C5-C6 and C6-C7.  At C5-C6 there is left-sided facet arthropathy resulting in mild neural foraminal narrowing, no spinal canal stenosis.  At C6-C7 there is mild spinal canal stenosis and mild bilateral neural foraminal narrowing.  Remaining levels are unremarkable.    Miscellaneous: The soft tissues of the neck are unremarkable.  There are emphysematous changes of the lung apices.                                 Medications   acetaminophen tablet 1,000 mg (1,000 mg Oral Given 3/9/22 1355)   methocarbamoL tablet 1,500 mg (1,500 mg Oral Given 3/9/22 1400)   hydroCHLOROthiazide tablet 12.5 mg (12.5 mg Oral Given 3/9/22 1446)   hydrALAZINE injection 10 mg (10 mg Intravenous Given 3/9/22 1635)   amLODIPine tablet 10 mg (10 mg Oral Given 3/9/22 1728)   hydrALAZINE injection 10 mg (10 mg Intravenous Given 3/9/22 1803)     Medical Decision Making:   History:   Old Medical Records: I decided to obtain old medical records.  Initial Assessment:   Emergent evaluation for acute headache status post trauma to the head and neck.  He continues with headache and neck pain.  Denies any LOC.  Does report dizziness since the incident.  Denies any nausea or vomiting or seizure activity.  Has tried Aleve with no improvement.  Denies any additional complaints.  He appears in some distress secondary to pain however nontoxic.  No signs of head injury.  Diffuse tenderness to palpation of the posterior neck with 2 obvious bony deformity or step-off.  Fair range of motion of all extremities with strength bilaterally.  No focal neuro deficit.  Differential Diagnosis:   Acute intracranial process, concussion, posttraumatic headache, cervical strain, fracture, dislocation, uncontrolled hypertension, hypertensive urgency, hypertensive emergency  Clinical Tests:   Lab Tests: Reviewed and Ordered  Radiological  Study: Reviewed and Ordered  Medical Tests: Reviewed and Ordered  ED Management:  Given mechanism in patient's age CT of head and neck were obtained.  No acute intracranial process.  No acute fracture dislocation.  Reported only minimal improvement after Tylenol the ED.  Additional analgesics ordered.  On reassessment patient continued with elevated blood pressure.  Upon review of his chart this seems to be chronically elevated.  Pain appear to be adequate Sara controlled on reassessment however blood pressure persisted.  Given this elected to obtain labs and EKG to assess for any acute end-organ damage.  EKG reveals sinus bradycardia rate of 58. Right bundle-branch block which appears unchanged from previous.  No STEMI.  Patient initially given hydralazine with some modest improvement in blood pressure.  Labs with no gross abnormalities with exception of urine which reveals nitrates.  He has no symptoms however given this finding will treat as though reviewed his past where he grew out E coli species.  Attending also sent G/C.  Patient was given additional antihypertensive agent as he continued to persist with elevated blood pressure.  He did have some improvement after this.  Patient insists that he has chronically elevated blood pressure when going to the doctor however has been out of HCTZ x3 days and does believe this could be contributing.  As discussed with patient concern of uncontrolled hypertension he will enroll in digital hypertensive program as a believe he would benefit.  Overall impression is most consistent with posttraumatic headache and mild concussive symptoms given the location of headache and onset status post head injury and patient was encouraged to monitor blood pressure and have prompt return to the ED for any new or worsening symptoms. Strict instructions to follow up with primary care physician or reference provided for further assessment and evaluation. Given instructions to return for  any acute symptoms and verbalized understanding of this medical plan.                            Clinical Impression:   Final diagnoses:  [S09.90XA] Injury of head, initial encounter (Primary)  [I10] Hypertension  [N39.0, R31.9] Urinary tract infection with hematuria, site unspecified  [S06.0X0A] Concussion without loss of consciousness, initial encounter          ED Disposition Condition    Discharge Stable        ED Prescriptions     Medication Sig Dispense Start Date End Date Auth. Provider    ibuprofen (ADVIL,MOTRIN) 600 MG tablet Take 1 tablet (600 mg total) by mouth every 6 (six) hours as needed for Pain. 20 tablet 3/9/2022  ARNEL Castaneda    cephALEXin (KEFLEX) 500 MG capsule Take 1 capsule (500 mg total) by mouth 4 (four) times daily. for 7 days 28 capsule 3/9/2022 3/16/2022 ARNEL Castaneda    LIDOcaine (LIDODERM) 5 % Place 1 patch onto the skin once daily. Remove & Discard patch within 12 hours or as directed by MD 15 patch 3/9/2022  ARNEL Castaneda    methocarbamoL (ROBAXIN) 750 MG Tab Take 2 tablets (1,500 mg total) by mouth 3 (three) times daily. for 5 days 30 tablet 3/9/2022 3/14/2022 ARNEL Castaneda    amLODIPine (NORVASC) 5 MG tablet Take 2 tablets (10 mg total) by mouth once daily. 30 tablet 3/9/2022 3/9/2023 ARNEL Castaneda        Follow-up Information     Follow up With Specialties Details Why Contact Info    Jj Montgomery MD Family Medicine Schedule an appointment as soon as possible for a visit  for repeat evaultion of blood pressure [address]  [address]  [facility] [address]  [phone]      [facility]  Schedule an appointment as soon as possible for a visit   [address]  [facility] [address]  [phone]             ARNEL Castaneda  03/11/22 1935

## 2022-03-09 NOTE — ED NOTES
HPI  Pt advised he was sitting in a chair last date 3/8/2022 when a sign that was sitting on ground level tipped over striking the pt in the head/neck - pt has head and neck pain today - pt is parry x4 - pt denies any visual disturbances - pt wants to get evaluated for the pain

## 2022-03-09 NOTE — ED TRIAGE NOTES
Struck by a sign on head and neck yesterday. Denies LOC. C/o head and neck pain, took Aleve this AM with some effect. Not on AC therapy. BISMARK BUTTERFIELD&OX3.

## 2022-03-11 LAB
BACTERIA UR CULT: ABNORMAL
C TRACH DNA SPEC QL NAA+PROBE: NOT DETECTED
N GONORRHOEA DNA SPEC QL NAA+PROBE: NOT DETECTED

## 2022-03-16 DIAGNOSIS — Z12.11 COLON CANCER SCREENING: ICD-10-CM

## 2022-03-25 ENCOUNTER — OFFICE VISIT (OUTPATIENT)
Dept: INTERNAL MEDICINE | Facility: CLINIC | Age: 67
End: 2022-03-25
Payer: MEDICARE

## 2022-03-25 ENCOUNTER — PATIENT MESSAGE (OUTPATIENT)
Dept: ADMINISTRATIVE | Facility: OTHER | Age: 67
End: 2022-03-25
Payer: MEDICARE

## 2022-03-25 VITALS
SYSTOLIC BLOOD PRESSURE: 137 MMHG | HEART RATE: 70 BPM | BODY MASS INDEX: 27.84 KG/M2 | WEIGHT: 194.44 LBS | HEIGHT: 70 IN | DIASTOLIC BLOOD PRESSURE: 81 MMHG

## 2022-03-25 DIAGNOSIS — R63.0 DECREASED APPETITE: ICD-10-CM

## 2022-03-25 DIAGNOSIS — K21.9 GASTROESOPHAGEAL REFLUX DISEASE, UNSPECIFIED WHETHER ESOPHAGITIS PRESENT: ICD-10-CM

## 2022-03-25 DIAGNOSIS — J43.1 PANLOBULAR EMPHYSEMA: ICD-10-CM

## 2022-03-25 DIAGNOSIS — R06.83 SNORING: ICD-10-CM

## 2022-03-25 DIAGNOSIS — R60.0 BILATERAL LEG EDEMA: ICD-10-CM

## 2022-03-25 DIAGNOSIS — I10 PRIMARY HYPERTENSION: Primary | ICD-10-CM

## 2022-03-25 PROCEDURE — 3288F FALL RISK ASSESSMENT DOCD: CPT | Mod: CPTII,S$GLB,, | Performed by: INTERNAL MEDICINE

## 2022-03-25 PROCEDURE — 3075F SYST BP GE 130 - 139MM HG: CPT | Mod: CPTII,S$GLB,, | Performed by: INTERNAL MEDICINE

## 2022-03-25 PROCEDURE — 4010F PR ACE/ARB THEARPY RXD/TAKEN: ICD-10-PCS | Mod: CPTII,S$GLB,, | Performed by: INTERNAL MEDICINE

## 2022-03-25 PROCEDURE — 99999 PR PBB SHADOW E&M-EST. PATIENT-LVL IV: ICD-10-PCS | Mod: PBBFAC,,, | Performed by: INTERNAL MEDICINE

## 2022-03-25 PROCEDURE — 3288F PR FALLS RISK ASSESSMENT DOCUMENTED: ICD-10-PCS | Mod: CPTII,S$GLB,, | Performed by: INTERNAL MEDICINE

## 2022-03-25 PROCEDURE — 1101F PR PT FALLS ASSESS DOC 0-1 FALLS W/OUT INJ PAST YR: ICD-10-PCS | Mod: CPTII,S$GLB,, | Performed by: INTERNAL MEDICINE

## 2022-03-25 PROCEDURE — 3008F PR BODY MASS INDEX (BMI) DOCUMENTED: ICD-10-PCS | Mod: CPTII,S$GLB,, | Performed by: INTERNAL MEDICINE

## 2022-03-25 PROCEDURE — 1159F MED LIST DOCD IN RCRD: CPT | Mod: CPTII,S$GLB,, | Performed by: INTERNAL MEDICINE

## 2022-03-25 PROCEDURE — 3075F PR MOST RECENT SYSTOLIC BLOOD PRESS GE 130-139MM HG: ICD-10-PCS | Mod: CPTII,S$GLB,, | Performed by: INTERNAL MEDICINE

## 2022-03-25 PROCEDURE — 1101F PT FALLS ASSESS-DOCD LE1/YR: CPT | Mod: CPTII,S$GLB,, | Performed by: INTERNAL MEDICINE

## 2022-03-25 PROCEDURE — 3008F BODY MASS INDEX DOCD: CPT | Mod: CPTII,S$GLB,, | Performed by: INTERNAL MEDICINE

## 2022-03-25 PROCEDURE — 99499 RISK ADDL DX/OHS AUDIT: ICD-10-PCS | Mod: S$GLB,,, | Performed by: INTERNAL MEDICINE

## 2022-03-25 PROCEDURE — 99999 PR PBB SHADOW E&M-EST. PATIENT-LVL IV: CPT | Mod: PBBFAC,,, | Performed by: INTERNAL MEDICINE

## 2022-03-25 PROCEDURE — 1125F AMNT PAIN NOTED PAIN PRSNT: CPT | Mod: CPTII,S$GLB,, | Performed by: INTERNAL MEDICINE

## 2022-03-25 PROCEDURE — 99215 PR OFFICE/OUTPT VISIT, EST, LEVL V, 40-54 MIN: ICD-10-PCS | Mod: S$GLB,,, | Performed by: INTERNAL MEDICINE

## 2022-03-25 PROCEDURE — 1160F RVW MEDS BY RX/DR IN RCRD: CPT | Mod: CPTII,S$GLB,, | Performed by: INTERNAL MEDICINE

## 2022-03-25 PROCEDURE — 99499 UNLISTED E&M SERVICE: CPT | Mod: S$GLB,,, | Performed by: INTERNAL MEDICINE

## 2022-03-25 PROCEDURE — 1160F PR REVIEW ALL MEDS BY PRESCRIBER/CLIN PHARMACIST DOCUMENTED: ICD-10-PCS | Mod: CPTII,S$GLB,, | Performed by: INTERNAL MEDICINE

## 2022-03-25 PROCEDURE — 99215 OFFICE O/P EST HI 40 MIN: CPT | Mod: S$GLB,,, | Performed by: INTERNAL MEDICINE

## 2022-03-25 PROCEDURE — 3079F PR MOST RECENT DIASTOLIC BLOOD PRESSURE 80-89 MM HG: ICD-10-PCS | Mod: CPTII,S$GLB,, | Performed by: INTERNAL MEDICINE

## 2022-03-25 PROCEDURE — 1125F PR PAIN SEVERITY QUANTIFIED, PAIN PRESENT: ICD-10-PCS | Mod: CPTII,S$GLB,, | Performed by: INTERNAL MEDICINE

## 2022-03-25 PROCEDURE — 1159F PR MEDICATION LIST DOCUMENTED IN MEDICAL RECORD: ICD-10-PCS | Mod: CPTII,S$GLB,, | Performed by: INTERNAL MEDICINE

## 2022-03-25 PROCEDURE — 4010F ACE/ARB THERAPY RXD/TAKEN: CPT | Mod: CPTII,S$GLB,, | Performed by: INTERNAL MEDICINE

## 2022-03-25 PROCEDURE — 3079F DIAST BP 80-89 MM HG: CPT | Mod: CPTII,S$GLB,, | Performed by: INTERNAL MEDICINE

## 2022-03-25 RX ORDER — CARVEDILOL 6.25 MG/1
6.25 TABLET ORAL 2 TIMES DAILY WITH MEALS
Qty: 60 TABLET | Refills: 2 | Status: CANCELLED | OUTPATIENT
Start: 2022-03-25 | End: 2023-03-25

## 2022-03-25 RX ORDER — AMLODIPINE BESYLATE 10 MG/1
10 TABLET ORAL DAILY
Qty: 90 TABLET | Refills: 1 | Status: SHIPPED | OUTPATIENT
Start: 2022-03-25 | End: 2022-07-27 | Stop reason: SDUPTHER

## 2022-03-25 RX ORDER — CYPROHEPTADINE HYDROCHLORIDE 4 MG/1
4 TABLET ORAL 3 TIMES DAILY
Qty: 90 TABLET | Refills: 0 | Status: SHIPPED | OUTPATIENT
Start: 2022-03-25 | End: 2022-09-01

## 2022-03-25 NOTE — PROGRESS NOTES
Subjective:       Patient ID: Tee Mtz is a 66 y.o. male who  has a past medical history of Coronary artery disease, Emphysema of lung, Hyperlipidemia, and Hypertension.    Chief Complaint: Follow-up and Hypertension     History was obtained from the patient and supplemented through chart review  He is a patient of Dr. Montgomery.  Was last seen  for CAD.    HPI    HTN:    Was seen in the ED about 2 weeks ago d/t head trauma.  BP was 173/94. Was given hydralazine.  Was out of HCTZ for several days.    Pt's BP is controlled on Norvasc 10, lisinopril 40, Lopressor 50 once a day, HCTZ 12.5. No issues with the cost of meds. Compliant with meds.  H/o CAD.  Tolerating meds well.     Home BP: no cuff. Is interested in digital HTN.    KIRA eval as below.    BLE Edema:  He reports mild BLE edema. On CCB, diuretic. Tries to avoid salt.    Snoring:    No apnea, daytime fatigue.  +HTN, overweight. Saw sleep clinic in 2020, but never scheduled PSG.     GERD:  D/t food triggers.      He is requesting refill of periactin d/t decreased appetite.  BMI Readings from Last 3 Encounters:   03/25/22 27.90 kg/m²   03/09/22 25.83 kg/m²   07/08/21 27.26 kg/m²     Review of Systems   Constitutional: Negative for activity change.   Respiratory: Negative for wheezing.    Cardiovascular: Positive for leg swelling.   Musculoskeletal: Negative for gait problem.   Skin: Negative for rash and wound.   Neurological: Negative for dizziness and light-headedness.   Hematological: Negative for adenopathy.   Psychiatric/Behavioral: Negative for confusion. The patient is not nervous/anxious.          Past Medical History:   Diagnosis Date    Coronary artery disease     Emphysema of lung     Hyperlipidemia     Hypertension      Past Surgical History:   Procedure Laterality Date    CARDIAC CATHETERIZATION  2004    x2    HIP SURGERY       Family History   Problem Relation Age of Onset    Heart attack Mother     Heart disease Mother      "Hypertension Mother      Social History     Socioeconomic History    Marital status: Single   Tobacco Use    Smoking status: Former Smoker     Quit date: 1993     Years since quittin.2    Smokeless tobacco: Never Used   Substance and Sexual Activity    Alcohol use: Yes     Comment: on weekends    Drug use: Yes     Types: Marijuana     Comment: every now and then    Sexual activity: Yes     Objective:      Vitals:    22 1309   BP: 137/81   Pulse: 70   Weight: 88.2 kg (194 lb 7.1 oz)   Height: 5' 10" (1.778 m)      Physical Exam  Constitutional:       General: He is not in acute distress.     Appearance: He is well-developed. He is not diaphoretic.   HENT:      Head: Normocephalic and atraumatic.      Nose: Nose normal.      Mouth/Throat:      Pharynx: No oropharyngeal exudate.      Comments: mallampati III  Eyes:      General: No scleral icterus.        Right eye: No discharge.         Left eye: No discharge.   Neck:      Thyroid: No thyromegaly.      Trachea: No tracheal deviation.   Cardiovascular:      Rate and Rhythm: Normal rate and regular rhythm.      Heart sounds: Normal heart sounds. No murmur heard.  Pulmonary:      Effort: Pulmonary effort is normal. No respiratory distress.      Breath sounds: Normal breath sounds. No wheezing.   Abdominal:      General: Bowel sounds are normal. There is no distension.      Palpations: Abdomen is soft.      Tenderness: There is no abdominal tenderness.   Musculoskeletal:         General: No deformity.      Cervical back: Neck supple.      Right lower leg: No edema.      Left lower leg: No edema.   Lymphadenopathy:      Cervical: No cervical adenopathy.   Skin:     General: Skin is warm and dry.      Findings: No erythema.   Neurological:      Mental Status: He is alert.      Gait: Gait normal.   Psychiatric:         Behavior: Behavior normal.           Lab Results   Component Value Date    WBC 7.73 2022    HGB 15.7 2022    HCT 48.4 " 03/09/2022     03/09/2022    CHOL 129 12/16/2019    TRIG 83 12/16/2019    HDL 41 12/16/2019    ALT 19 03/09/2022    AST 30 03/09/2022     03/09/2022    K 5.2 (H) 03/09/2022     03/09/2022    CREATININE 1.0 03/09/2022    BUN 15 03/09/2022    CO2 22 (L) 03/09/2022    TSH 0.839 12/16/2019    INR 1.0 08/21/2009    HGBA1C 5.8 (H) 12/16/2019       The ASCVD Risk score (Damar TERRA Jr., et al., 2013) failed to calculate for the following reasons:    The valid total cholesterol range is 130 to 320 mg/dL    (Imaging have been independently reviewed)  CT cervical spine with DDD.    Assessment:       1. Primary hypertension    2. Bilateral leg edema    3. Snoring    4. Panlobular emphysema    5. Gastroesophageal reflux disease, unspecified whether esophagitis present    6. Decreased appetite          Plan:       Tee was seen today for follow-up and hypertension.    Diagnoses and all orders for this visit:    Primary hypertension  Comments:  Now controlled. Cont current meds. Consider Lopressor->Toprol. Enroll in digital HTN. KIRA eval.  Orders:  -     Ambulatory referral/consult to Sleep Disorders; Future  -     amLODIPine (NORVASC) 10 MG tablet; Take 1 tablet (10 mg total) by mouth once daily.  -     NURSING COMMUNICATION: Create [email or web address] Account  -     Hypertension Digital Medicine ([facility]) Enrollment Order  -     Hypertension Digital Medicine ([facility]): Assign Onboarding Questionnaires    Bilateral leg edema  Comments:  None on exam. Cont diuretic. Rec low Na diet, stretch/exercise.    Snoring  Comments:  +HTN. Refer to sleep clinic for sleep study.  Orders:  -     Ambulatory referral/consult to Sleep Disorders; Future    Panlobular emphysema  Comments:  No acute issues.  Encouraged continued tobacco cessation.    Gastroesophageal reflux disease, unspecified whether esophagitis present  Comments:  Discussed avoiding fatty, large meals, acidic foods. Provided dietary handout.     Decreased  appetite  Comments:  Refilled to bridge until PCP visit, but is unusual given his weight. F/u c PCP.  Orders:  -     cyproheptadine (PERIACTIN) 4 mg tablet; Take 1 tablet (4 mg total) by mouth 3 (three) times daily.    Other orders  The following orders have not been finalized:  -     Cancel: carvediloL (COREG) 6.25 MG tablet         Side effects of medication(s) were discussed in detail and patient voiced understanding.  Patient will call back for any issues or complications.     I have spent a total of 40 minutes with the patient as well as reviewing the chart/medical record and placing orders on the day of the visit. Discussed HTN, KIRA eval, GERD.    RTC in 3 month(s) or sooner PRN for HTN with PCP.

## 2022-03-25 NOTE — PATIENT INSTRUCTIONS
[facility] digital hypertension program:  [phone]  This will connect to your phone through bluetooth and will send your blood pressure readings to a clinical pharmacist.  If your blood pressures are consistently high, they will send in medication changes.  Your clinical pharmacist and health  will check in with you periodically.  This will be a good way to monitor your blood pressure in between clinic visits.  You should be receiving an email with a questionnaire.  You may complete this questionnaire at home or in person at the [facility], which is located on the 2nd floor of our clinic building right outside the elevator.        Tips to Control Acid Reflux    To control acid reflux, youll need to make some basic diet and lifestyle changes. The simple steps outlined below may be all youll need to ease discomfort.  Watch what you eat  Avoid fatty foods and spicy foods.  Eat fewer acidic foods, such as citrus and tomato-based foods. These can increase symptoms.  Limit drinking alcohol, caffeine, and fizzy beverages. All increase acid reflux.  Try limiting chocolate, peppermint, and spearmint. These can worsen acid reflux in some people.  Watch when you eat  Avoid lying down for 3 hours after eating.  Do not snack before going to bed.  Raise your head  Raising your head and upper body by 4 to 6 inches helps limit reflux when youre lying down. Put blocks under the head of your bed frame to raise it.  Other changes  Lose weight, if you need to  Dont exercise near bedtime  Avoid tight-fitting clothes  Limit aspirin and ibuprofen  Stop smoking   Date Last Reviewed: 7/1/2016  © 3202-2398 [employer]. [address] [place] [address], [place] [address]. All rights reserved. This information is not intended as a substitute for professional medical care. Always follow your healthcare professional's instructions.

## 2022-04-04 ENCOUNTER — PATIENT OUTREACH (OUTPATIENT)
Dept: INTERNAL MEDICINE | Facility: CLINIC | Age: 67
End: 2022-04-04
Payer: MEDICARE

## 2022-04-08 ENCOUNTER — PATIENT OUTREACH (OUTPATIENT)
Dept: ADMINISTRATIVE | Facility: OTHER | Age: 67
End: 2022-04-08
Payer: MEDICARE

## 2022-04-18 ENCOUNTER — TELEPHONE (OUTPATIENT)
Dept: ADMINISTRATIVE | Facility: OTHER | Age: 67
End: 2022-04-18
Payer: MEDICARE

## 2022-04-18 NOTE — TELEPHONE ENCOUNTER
LM for patient to contact our scheduling department to discuss rescheduling appointment of 4-11-22 with Sleep Medicine department. Contact number provided.

## 2022-05-19 ENCOUNTER — TELEPHONE (OUTPATIENT)
Dept: SLEEP MEDICINE | Facility: OTHER | Age: 67
End: 2022-05-19

## 2022-06-09 ENCOUNTER — PATIENT OUTREACH (OUTPATIENT)
Dept: FAMILY MEDICINE | Facility: CLINIC | Age: 67
End: 2022-06-09
Payer: MEDICARE

## 2022-06-09 ENCOUNTER — HOSPITAL ENCOUNTER (EMERGENCY)
Facility: OTHER | Age: 67
Discharge: HOME | End: 2022-06-10
Attending: EMERGENCY MEDICINE
Payer: MEDICARE

## 2022-06-09 DIAGNOSIS — K92.1 HEMATOCHEZIA: ICD-10-CM

## 2022-06-09 DIAGNOSIS — R19.7 DIARRHEA, UNSPECIFIED TYPE: Primary | ICD-10-CM

## 2022-06-09 LAB
ALBUMIN SERPL BCP-MCNC: 3.8 G/DL (ref 3.5–5.2)
ALP SERPL-CCNC: 49 U/L (ref 55–135)
ALT SERPL W/O P-5'-P-CCNC: 16 U/L (ref 10–44)
ANION GAP SERPL CALC-SCNC: 13 MMOL/L (ref 8–16)
ANISOCYTOSIS BLD QL SMEAR: SLIGHT
AST SERPL-CCNC: 29 U/L (ref 10–40)
BACTERIA #/AREA URNS HPF: ABNORMAL /HPF
BASOPHILS # BLD AUTO: 0.06 K/UL (ref 0–0.2)
BASOPHILS NFR BLD: 0.7 % (ref 0–1.9)
BILIRUB SERPL-MCNC: 0.6 MG/DL (ref 0.1–1)
BILIRUB UR QL STRIP: NEGATIVE
BUN SERPL-MCNC: 16 MG/DL (ref 8–23)
CALCIUM SERPL-MCNC: 9.2 MG/DL (ref 8.7–10.5)
CHLORIDE SERPL-SCNC: 110 MMOL/L (ref 95–110)
CLARITY UR: CLEAR
CO2 SERPL-SCNC: 17 MMOL/L (ref 23–29)
COLOR UR: YELLOW
CREAT SERPL-MCNC: 0.9 MG/DL (ref 0.5–1.4)
CREAT SERPL-MCNC: 1 MG/DL (ref 0.5–1.4)
DIFFERENTIAL METHOD: ABNORMAL
EOSINOPHIL # BLD AUTO: 0.1 K/UL (ref 0–0.5)
EOSINOPHIL NFR BLD: 1.2 % (ref 0–8)
ERYTHROCYTE [DISTWIDTH] IN BLOOD BY AUTOMATED COUNT: 16.6 % (ref 11.5–14.5)
EST. GFR  (AFRICAN AMERICAN): >60 ML/MIN/1.73 M^2
EST. GFR  (NON AFRICAN AMERICAN): >60 ML/MIN/1.73 M^2
GLUCOSE SERPL-MCNC: 88 MG/DL (ref 70–110)
GLUCOSE UR QL STRIP: NEGATIVE
HCT VFR BLD AUTO: 43 % (ref 40–54)
HCV AB SERPL QL IA: NEGATIVE
HGB BLD-MCNC: 13.8 G/DL (ref 14–18)
HGB UR QL STRIP: ABNORMAL
HIV 1+2 AB+HIV1 P24 AG SERPL QL IA: NEGATIVE
HYPOCHROMIA BLD QL SMEAR: ABNORMAL
IMM GRANULOCYTES # BLD AUTO: 0.02 K/UL (ref 0–0.04)
IMM GRANULOCYTES NFR BLD AUTO: 0.2 % (ref 0–0.5)
KETONES UR QL STRIP: NEGATIVE
LEUKOCYTE ESTERASE UR QL STRIP: ABNORMAL
LIPASE SERPL-CCNC: 43 U/L (ref 4–60)
LYMPHOCYTES # BLD AUTO: 3.1 K/UL (ref 1–4.8)
LYMPHOCYTES NFR BLD: 35.5 % (ref 18–48)
MCH RBC QN AUTO: 23.4 PG (ref 27–31)
MCHC RBC AUTO-ENTMCNC: 32.1 G/DL (ref 32–36)
MCV RBC AUTO: 73 FL (ref 82–98)
MICROSCOPIC COMMENT: ABNORMAL
MONOCYTES # BLD AUTO: 1 K/UL (ref 0.3–1)
MONOCYTES NFR BLD: 11.8 % (ref 4–15)
NEUTROPHILS # BLD AUTO: 4.4 K/UL (ref 1.8–7.7)
NEUTROPHILS NFR BLD: 50.6 % (ref 38–73)
NITRITE UR QL STRIP: NEGATIVE
NRBC BLD-RTO: 0 /100 WBC
OVALOCYTES BLD QL SMEAR: ABNORMAL
PH UR STRIP: 7 [PH] (ref 5–8)
PLATELET # BLD AUTO: 214 K/UL (ref 150–450)
PLATELET BLD QL SMEAR: ABNORMAL
PMV BLD AUTO: 11 FL (ref 9.2–12.9)
POIKILOCYTOSIS BLD QL SMEAR: SLIGHT
POTASSIUM SERPL-SCNC: 4.9 MMOL/L (ref 3.5–5.1)
PROT SERPL-MCNC: 7.9 G/DL (ref 6–8.4)
PROT UR QL STRIP: NEGATIVE
RBC # BLD AUTO: 5.89 M/UL (ref 4.6–6.2)
RBC #/AREA URNS HPF: 5 /HPF (ref 0–4)
SAMPLE: NORMAL
SODIUM SERPL-SCNC: 140 MMOL/L (ref 136–145)
SP GR UR STRIP: 1.02 (ref 1–1.03)
URN SPEC COLLECT METH UR: ABNORMAL
UROBILINOGEN UR STRIP-ACNC: NEGATIVE EU/DL
WBC # BLD AUTO: 8.65 K/UL (ref 3.9–12.7)
WBC #/AREA URNS HPF: 3 /HPF (ref 0–5)

## 2022-06-09 PROCEDURE — 87389 HIV-1 AG W/HIV-1&-2 AB AG IA: CPT | Performed by: NURSE PRACTITIONER

## 2022-06-09 PROCEDURE — 85025 COMPLETE CBC W/AUTO DIFF WBC: CPT | Performed by: NURSE PRACTITIONER

## 2022-06-09 PROCEDURE — 81000 URINALYSIS NONAUTO W/SCOPE: CPT | Performed by: NURSE PRACTITIONER

## 2022-06-09 PROCEDURE — 99284 EMERGENCY DEPT VISIT MOD MDM: CPT | Mod: 25

## 2022-06-09 PROCEDURE — 86803 HEPATITIS C AB TEST: CPT | Performed by: NURSE PRACTITIONER

## 2022-06-09 PROCEDURE — 80053 COMPREHEN METABOLIC PANEL: CPT | Performed by: NURSE PRACTITIONER

## 2022-06-09 PROCEDURE — 83690 ASSAY OF LIPASE: CPT | Performed by: NURSE PRACTITIONER

## 2022-06-10 VITALS
DIASTOLIC BLOOD PRESSURE: 92 MMHG | TEMPERATURE: 98 F | HEIGHT: 70 IN | SYSTOLIC BLOOD PRESSURE: 175 MMHG | BODY MASS INDEX: 27.49 KG/M2 | WEIGHT: 192 LBS | OXYGEN SATURATION: 100 % | RESPIRATION RATE: 16 BRPM | HEART RATE: 71 BPM

## 2022-06-10 NOTE — FIRST PROVIDER EVALUATION
" Emergency Department TeleTriage Encounter Note      CHIEF COMPLAINT    Chief Complaint   Patient presents with    Abdominal Pain     General region abdominal pain x 12 hours. Pt reports "bright red blood when I wiped myself this evening."       VITAL SIGNS   Initial Vitals [06/09/22 2012]   BP Pulse Resp Temp SpO2   (!) 166/95 80 16 97.8 °F (36.6 °C) 97 %      MAP       --            ALLERGIES    Review of patient's allergies indicates:  No Known Allergies    PROVIDER TRIAGE NOTE  TeleTriage Note: Tee Mtz, a nontoxic/well appearing, 67 y.o. male, presented to the ED with c/o LLQ abdominal pain and diarrhea since 1 pm. He noticed blood in the toilet with his last bowel movement. Denies N/V.     All ED beds are full at present; patient notified of this status.  Patient seen and medically screened by Nurse Practitioner via teletriage. Orders initiated at triage to expedite care.  Patient is stable to return to the waiting room and will be placed in an ED bed when available.  Care will be transferred to an alternate provider when patient has been placed in an Exam Room from the [facility] for physical exam, additional orders, and disposition.  8:26 PM Harleen Albert, DNP, FNP-C        ORDERS  Labs Reviewed   CBC W/ AUTO DIFFERENTIAL   COMPREHENSIVE METABOLIC PANEL   LIPASE   URINALYSIS, REFLEX TO URINE CULTURE       ED Orders (720h ago, onward)    Start Ordered     Status Ordering Provider    06/09/22 2028 06/09/22 2027  Vital signs  Every 2 hours         Ordered HARLEEN ALBERT    06/09/22 2028 06/09/22 2027  Diet NPO  Diet effective now         Ordered HARLEEN ALBERT    06/09/22 2028 06/09/22 2027  Insert peripheral IV  Once         Ordered HARLEEN ALBERT    06/09/22 2028 06/09/22 2027  CBC W/ AUTO DIFFERENTIAL  STAT         Ordered HARLEEN ALBERT    06/09/22 2028 06/09/22 2027  Comp. Metabolic Panel  STAT         Ordered HARLEEN ALBERT    06/09/22 2028 06/09/22 2027  POCT Venous Blood Gas Once  " Once        Comments: This test should be used for VBGs.  If using this order for other tests (K, creatinine, HCT, PT/INR, lactate etc)  ONLY do so in the case of an emergency or rapid response.Notify Physician if: see parameters below.      Ordered MENDOZAVALERIY ANDRADE    06/09/22 2028 06/09/22 2027  Lipase  STAT         Ordered VALERIY DONALDSON    06/09/22 2028 06/09/22 2027  Urinalysis, Reflex to Urine Culture Urine, Clean Catch  STAT         Ordered VALERIY DONALDSON            Virtual Visit Note: The provider triage portion of this emergency department evaluation and documentation was performed via [employer], a HIPAA-compliant telemedicine application, in concert with a tele-presenter in the room. A face to face patient evaluation with one of my colleagues will occur once the patient is placed in an emergency department room.      DISCLAIMER: This note was prepared with [employer] voice recognition transcription software. Garbled syntax, mangled pronouns, and other bizarre constructions may be attributed to that software system.
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
## 2022-06-10 NOTE — ED TRIAGE NOTES
66 y/o male presents to ED after noticing blood while wiping and blood clots in his stool. Reports lower abdominal cramping. Denies blood thinners.

## 2022-06-15 ENCOUNTER — PES CALL (OUTPATIENT)
Dept: ADMINISTRATIVE | Facility: CLINIC | Age: 67
End: 2022-06-15
Payer: MEDICARE

## 2022-07-18 ENCOUNTER — TELEPHONE (OUTPATIENT)
Dept: INTERNAL MEDICINE | Facility: CLINIC | Age: 67
End: 2022-07-18
Payer: MEDICARE

## 2022-07-18 NOTE — TELEPHONE ENCOUNTER
----- Message from Claudy Guerin sent at 7/18/2022  8:52 AM CDT -----  Name of Who is Calling: CHOLO MAYFIELD          What is the request in detail: The patient is calling to speak to the nurse in regards to coming in to  paperwork from his last visit. Please advise          Can the clinic reply by [facility]:No         [facility]: [phone]

## 2022-07-28 DIAGNOSIS — E78.5 HYPERLIPIDEMIA LDL GOAL <100: ICD-10-CM

## 2022-07-28 RX ORDER — ATORVASTATIN CALCIUM 80 MG/1
80 TABLET, FILM COATED ORAL NIGHTLY
Qty: 30 TABLET | Refills: 0 | OUTPATIENT
Start: 2022-07-28 | End: 2022-10-26

## 2022-07-28 RX ORDER — ATORVASTATIN CALCIUM 80 MG/1
80 TABLET, FILM COATED ORAL NIGHTLY
Qty: 30 TABLET | Refills: 0 | Status: SHIPPED | OUTPATIENT
Start: 2022-07-28 | End: 2022-09-01

## 2022-07-28 NOTE — TELEPHONE ENCOUNTER
----- Message from Shayy Padilla PharmD sent at 7/27/2022  5:27 PM CDT -----  Regarding: Refill-Atorvastatin  Patient needs a refill on atorvastatin sent to [facility] pharmacy for a 90 day supply.    Thank you!

## 2022-07-28 NOTE — TELEPHONE ENCOUNTER
No new care gaps identified.  [facility] Embedded Care Gaps. Reference number: [id number]. 7/28/2022   8:48:53 AM FIGUEROAT

## 2022-07-28 NOTE — TELEPHONE ENCOUNTER
Care Due:                  Date            Visit Type   Department     Provider  --------------------------------------------------------------------------------                                SAME DAY -                              ESTABLISHED   [facility] INTER[facility]  Last Visit: 03-      PATIENT      MEDICINE       Bernice Fontanez  Next Visit: None Scheduled  None         None Found                                                            Last  Test          Frequency    Reason                     Performed    Due Date  --------------------------------------------------------------------------------    Lipid Panel.  12 months..  atorvastatin.............  Not Found    Overdue    [employer] Embedded Care Gaps. Reference number: [id number]. 7/28/2022   8:26:27 AM CDT

## 2022-07-29 NOTE — TELEPHONE ENCOUNTER
Refill Decision Note   Tee Mtz  is requesting a refill authorization.  Brief Assessment and Rationale for Refill:  Quick Discontinue     Medication Therapy Plan:  sent on 7/28    Medication Reconciliation Completed: No   Comments:     No Care Gaps recommended.     Note composed:7:35 PM 07/28/2022

## 2022-08-10 ENCOUNTER — TELEPHONE (OUTPATIENT)
Dept: ADMINISTRATIVE | Facility: CLINIC | Age: 67
End: 2022-08-10
Payer: MEDICARE

## 2022-09-13 ENCOUNTER — PATIENT OUTREACH (OUTPATIENT)
Dept: ADMINISTRATIVE | Facility: HOSPITAL | Age: 67
End: 2022-09-13

## 2022-09-28 ENCOUNTER — PES CALL (OUTPATIENT)
Dept: ADMINISTRATIVE | Facility: CLINIC | Age: 67
End: 2022-09-28
Payer: MEDICARE

## 2022-10-03 ENCOUNTER — PES CALL (OUTPATIENT)
Dept: ADMINISTRATIVE | Facility: CLINIC | Age: 67
End: 2022-10-03
Payer: MEDICARE

## 2022-11-22 ENCOUNTER — PATIENT MESSAGE (OUTPATIENT)
Dept: INTERNAL MEDICINE | Facility: CLINIC | Age: 67
End: 2022-11-22
Payer: MEDICARE

## 2022-12-02 ENCOUNTER — PES CALL (OUTPATIENT)
Dept: ADMINISTRATIVE | Facility: CLINIC | Age: 67
End: 2022-12-02
Payer: MEDICARE

## 2023-02-07 DIAGNOSIS — Z00.00 ENCOUNTER FOR MEDICARE ANNUAL WELLNESS EXAM: ICD-10-CM

## 2023-02-09 DIAGNOSIS — Z00.00 ENCOUNTER FOR MEDICARE ANNUAL WELLNESS EXAM: ICD-10-CM

## 2023-05-08 ENCOUNTER — HOSPITAL ENCOUNTER (EMERGENCY)
Facility: HOSPITAL | Age: 68
Discharge: HOME | End: 2023-05-08
Attending: EMERGENCY MEDICINE
Payer: MEDICARE

## 2023-05-08 ENCOUNTER — TELEPHONE (OUTPATIENT)
Dept: INTERNAL MEDICINE | Facility: CLINIC | Age: 68
End: 2023-05-08
Payer: MEDICARE

## 2023-05-08 VITALS
OXYGEN SATURATION: 100 % | WEIGHT: 191 LBS | DIASTOLIC BLOOD PRESSURE: 86 MMHG | HEIGHT: 69 IN | HEART RATE: 71 BPM | TEMPERATURE: 98 F | SYSTOLIC BLOOD PRESSURE: 144 MMHG | RESPIRATION RATE: 14 BRPM | BODY MASS INDEX: 28.29 KG/M2

## 2023-05-08 DIAGNOSIS — S89.91XA KNEE INJURY, RIGHT, INITIAL ENCOUNTER: ICD-10-CM

## 2023-05-08 PROCEDURE — 99284 EMERGENCY DEPT VISIT MOD MDM: CPT | Mod: ER,25

## 2023-05-08 PROCEDURE — 29505 APPLICATION LONG LEG SPLINT: CPT | Mod: RT,ER

## 2023-05-08 RX ORDER — NAPROXEN 500 MG/1
500 TABLET ORAL 2 TIMES DAILY WITH MEALS
Qty: 20 TABLET | Refills: 0 | Status: SHIPPED | OUTPATIENT
Start: 2023-05-08

## 2023-05-08 RX ORDER — DICLOFENAC SODIUM 10 MG/G
2 GEL TOPICAL 2 TIMES DAILY
Qty: 50 G | Refills: 0 | Status: SHIPPED | OUTPATIENT
Start: 2023-05-08 | End: 2023-05-18

## 2023-05-08 NOTE — ED PROVIDER NOTES
Encounter Date: 2023    SCRIBE #1 NOTE: I, Esther Camcaho, am scribing for, and in the presence of,  Zulema Holliday MD. I have scribed the following portions of the note - Other sections scribed: HPI, ROS, PE.     History     Chief Complaint   Patient presents with    Knee Pain     Pt reports falling onto his right knee on Friday. Pain when ambulating. Pt took perocet with no relief. Unable to view right knee due to pt wearing jeans.      68 y.o. male with PMHx of CAD, Emphysema, HLD, and HTN who presents to the ED for chief complaint of traumatic right knee pain. Patient was fishing 3 days ago when he fell into his right knee on a hard surface. He has been able to ambulate.  Patient is taking percocet with limited relief. No other injuries.    The history is provided by the patient. No  was used.   Review of patient's allergies indicates:   Allergen Reactions    Meperidine Other (See Comments)     Not specified.     Past Medical History:   Diagnosis Date    Coronary artery disease     Emphysema of lung     Hyperlipidemia     Hypertension      Past Surgical History:   Procedure Laterality Date    CARDIAC CATHETERIZATION  2004    x2    HIP SURGERY       Family History   Problem Relation Age of Onset    Heart attack Mother     Heart disease Mother     Hypertension Mother      Social History     Tobacco Use    Smoking status: Former     Types: Cigarettes     Quit date: 1993     Years since quittin.3    Smokeless tobacco: Never   Substance Use Topics    Alcohol use: Yes     Comment: 2-3/ week    Drug use: Yes     Types: Marijuana     Comment: every now and then     Review of Systems   Constitutional:  Negative for fatigue and fever.   HENT:  Negative for facial swelling.    Eyes:  Negative for pain.   Respiratory:  Negative for shortness of breath.    Cardiovascular:  Negative for chest pain.   Gastrointestinal:  Negative for abdominal pain, nausea and vomiting.   Genitourinary:  Negative for  dysuria.   Musculoskeletal:  Positive for arthralgias (R knee). Negative for back pain and myalgias.   Skin:  Negative for color change.   Neurological:  Negative for headaches.   Hematological:  Does not bruise/bleed easily.   Psychiatric/Behavioral:  Negative for behavioral problems.      Physical Exam     Initial Vitals [05/08/23 1400]   BP Pulse Resp Temp SpO2   (!) 179/107 78 18 98.2 °F (36.8 °C) 99 %      MAP       --         Physical Exam    Nursing note and vitals reviewed.  Constitutional: Vital signs are normal. He appears well-developed. He is cooperative.  Non-toxic appearance. He does not appear ill.   HENT:   Head: Normocephalic and atraumatic.   Eyes: Conjunctivae are normal.   Neck:   Normal range of motion.  Cardiovascular:  Normal rate and regular rhythm.           Pulmonary/Chest: Effort normal and breath sounds normal. He exhibits no tenderness.   Abdominal: Abdomen is soft. There is no abdominal tenderness.   Musculoskeletal:      Cervical back: Normal range of motion.      Comments: RLE: Full ROM. No obvious deformity. No significant warmth.       Neurological: He is alert and oriented to person, place, and time. GCS eye subscore is 4. GCS verbal subscore is 5. GCS motor subscore is 6.   Skin: Skin is warm, dry and intact. No rash noted.   Psychiatric: He has a normal mood and affect. His speech is normal and behavior is normal. Judgment and thought content normal.       ED Course   Procedures  Labs Reviewed - No data to display       Imaging Results              X-Ray Knee 3 View Right (Final result)  Result time 05/08/23 14:54:35      Final result by Herbie Rousseau MD (05/08/23 14:54:35)                   Impression:      No acute displaced fracture-dislocation identified.      Electronically signed by: Herbie Rousseau MD  Date:    05/08/2023  Time:    14:54               Narrative:    EXAMINATION:  XR KNEE 3 VIEW RIGHT    CLINICAL HISTORY:  Unspecified injury of right lower leg, initial  encounter    TECHNIQUE:  AP, lateral, and Merchant views of the right knee were performed.    COMPARISON:  Right knee series 06/12/2015    FINDINGS:  Bones are well mineralized. Overall alignment is within normal limits.  No displaced fracture, dislocation or destructive osseous process.  Mild tricompartmental DJD.  No large suprapatellar joint effusion.  Scattered atherosclerotic vascular calcifications noted..  No subcutaneous emphysema or radiodense retained foreign body.                                       Medications - No data to display  Medical Decision Making:   History:   Old Medical Records: I decided to obtain old medical records.  Initial Assessment:   This is an urgent evaluation of a 68-year-old man who presented to the emergency department today secondary to knee pain.  Differential Diagnosis:   Fracture, dislocation, contusion, ligamentous injury  Clinical Tests:   Radiological Study: Reviewed and Ordered  ED Management:  On physical examination, patient was in no acute distress.  Heart and lung sounds were within normal limits.  Examination of the knee revealed no obvious deformity, no crepitus, no warmth.  There was no laxity of joint.  X-ray was obtained and showed no acute processes per Radiology interpretation.  Patient was placed in a knee immobilizer and advised to follow-up with primary care for a re-evaluation if symptoms fail to improve.  He was given a prescription for Voltaren gel and naproxen.  Return precautions were provided all questions and concerns were addressed.    Zulema Holliday MD May 8, 2023 3:42 p.m.        Scribe Attestation:   Scribe #1: I performed the above scribed service and the documentation accurately describes the services I performed. I attest to the accuracy of the note.                 I, Zulema Holliday MD, personally performed the services described in the documentation.  All medical records entries made by the scribe were made at my direction and in my presence.  I  have reviewed the chart and agree that the record reflects my personal performance and is accurate and complete.   Clinical Impression:   Final diagnoses:  [S89.91XA] Knee injury, right, initial encounter        ED Disposition Condition    Discharge Stable          ED Prescriptions       Medication Sig Dispense Start Date End Date Auth. Provider    diclofenac sodium (VOLTAREN) 1 % Gel Apply 2 g topically 2 (two) times daily. for 10 days 50 g 5/8/2023 5/18/2023 Zulema Holliday MD    naproxen (NAPROSYN) 500 MG tablet Take 1 tablet (500 mg total) by mouth 2 (two) times daily with meals. 20 tablet 5/8/2023 -- Zulema Holliday MD          Follow-up Information       Follow up With Specialties Details Why Contact Info    Jj Montgomery MD Family Medicine Schedule an appointment as soon as possible for a visit   [address]  [facility] [address]  [facility] [address]  [phone]               Zulema Holliday MD  05/12/23 9591

## 2023-05-08 NOTE — TELEPHONE ENCOUNTER
----- Message from Venkata Sy sent at 5/8/2023 10:56 AM CDT -----  Regarding: Appointment  Name of Who is Calling:  Patient           What is the request in detail:  Patient stated he fell down and hurt his right knee he want to make an appointment with Dr. Montgomery, The earliest appointment I have is 05/29/2023. Patient stated he needs to be seen earlier.            Can the clinic reply by [facility]: No            What Number to Call Back if not in [facility]: [phone]

## 2023-05-27 ENCOUNTER — HOSPITAL ENCOUNTER (OUTPATIENT)
Facility: HOSPITAL | Age: 68
Discharge: HOME | End: 2023-05-28
Attending: EMERGENCY MEDICINE | Admitting: EMERGENCY MEDICINE
Payer: MEDICARE

## 2023-05-27 DIAGNOSIS — R07.9 CHEST PAIN: ICD-10-CM

## 2023-05-27 DIAGNOSIS — I10 PRIMARY HYPERTENSION: ICD-10-CM

## 2023-05-27 DIAGNOSIS — Z01.810 PREOP CARDIOVASCULAR EXAM: ICD-10-CM

## 2023-05-27 DIAGNOSIS — K52.9 ENTERITIS: ICD-10-CM

## 2023-05-27 DIAGNOSIS — K56.609 SBO (SMALL BOWEL OBSTRUCTION): Primary | ICD-10-CM

## 2023-05-27 PROBLEM — N39.0 UTI (URINARY TRACT INFECTION): Status: ACTIVE | Noted: 2023-05-27

## 2023-05-27 PROBLEM — D72.829 LEUKOCYTOSIS: Status: ACTIVE | Noted: 2023-05-27

## 2023-05-27 LAB
ABO + RH BLD: NORMAL
ALBUMIN SERPL BCP-MCNC: 3.3 G/DL (ref 3.5–5.2)
ALBUMIN SERPL BCP-MCNC: 3.8 G/DL (ref 3.5–5.2)
ALP SERPL-CCNC: 35 U/L (ref 55–135)
ALP SERPL-CCNC: 36 U/L (ref 55–135)
ALT SERPL W/O P-5'-P-CCNC: 18 U/L (ref 10–44)
ALT SERPL W/O P-5'-P-CCNC: 20 U/L (ref 10–44)
ANION GAP SERPL CALC-SCNC: 11 MMOL/L (ref 8–16)
ANION GAP SERPL CALC-SCNC: 6 MMOL/L (ref 8–16)
APTT PPP: 23.9 SEC (ref 21–32)
AST SERPL-CCNC: 20 U/L (ref 10–40)
AST SERPL-CCNC: 26 U/L (ref 10–40)
BASOPHILS # BLD AUTO: 0.04 K/UL (ref 0–0.2)
BASOPHILS # BLD AUTO: 0.04 K/UL (ref 0–0.2)
BASOPHILS NFR BLD: 0.3 % (ref 0–1.9)
BASOPHILS NFR BLD: 0.4 % (ref 0–1.9)
BILIRUB SERPL-MCNC: 0.9 MG/DL (ref 0.1–1)
BILIRUB SERPL-MCNC: 1 MG/DL (ref 0.1–1)
BLD GP AB SCN CELLS X3 SERPL QL: NORMAL
BUN SERPL-MCNC: 15 MG/DL (ref 8–23)
BUN SERPL-MCNC: 16 MG/DL (ref 8–23)
CALCIUM SERPL-MCNC: 8.4 MG/DL (ref 8.7–10.5)
CALCIUM SERPL-MCNC: 9 MG/DL (ref 8.7–10.5)
CHLORIDE SERPL-SCNC: 109 MMOL/L (ref 95–110)
CHLORIDE SERPL-SCNC: 112 MMOL/L (ref 95–110)
CO2 SERPL-SCNC: 20 MMOL/L (ref 23–29)
CO2 SERPL-SCNC: 22 MMOL/L (ref 23–29)
CREAT SERPL-MCNC: 1.1 MG/DL (ref 0.5–1.4)
CREAT SERPL-MCNC: 1.1 MG/DL (ref 0.5–1.4)
DIFFERENTIAL METHOD: ABNORMAL
DIFFERENTIAL METHOD: ABNORMAL
EOSINOPHIL # BLD AUTO: 0 K/UL (ref 0–0.5)
EOSINOPHIL # BLD AUTO: 0 K/UL (ref 0–0.5)
EOSINOPHIL NFR BLD: 0.2 % (ref 0–8)
EOSINOPHIL NFR BLD: 0.3 % (ref 0–8)
ERYTHROCYTE [DISTWIDTH] IN BLOOD BY AUTOMATED COUNT: 16.4 % (ref 11.5–14.5)
ERYTHROCYTE [DISTWIDTH] IN BLOOD BY AUTOMATED COUNT: 19 % (ref 11.5–14.5)
EST. GFR  (NO RACE VARIABLE): >60 ML/MIN/1.73 M^2
EST. GFR  (NO RACE VARIABLE): >60 ML/MIN/1.73 M^2
GLUCOSE SERPL-MCNC: 107 MG/DL (ref 70–110)
GLUCOSE SERPL-MCNC: 110 MG/DL (ref 70–110)
HCT VFR BLD AUTO: 40.7 % (ref 40–54)
HCT VFR BLD AUTO: 46.8 % (ref 40–54)
HGB BLD-MCNC: 13.4 G/DL (ref 14–18)
HGB BLD-MCNC: 14.9 G/DL (ref 14–18)
IMM GRANULOCYTES # BLD AUTO: 0.02 K/UL (ref 0–0.04)
IMM GRANULOCYTES # BLD AUTO: 0.03 K/UL (ref 0–0.04)
IMM GRANULOCYTES NFR BLD AUTO: 0.2 % (ref 0–0.5)
IMM GRANULOCYTES NFR BLD AUTO: 0.3 % (ref 0–0.5)
INR PPP: 1.1 (ref 0.8–1.2)
LACTATE SERPL-SCNC: 1.4 MMOL/L (ref 0.5–2.2)
LYMPHOCYTES # BLD AUTO: 2.3 K/UL (ref 1–4.8)
LYMPHOCYTES # BLD AUTO: 3 K/UL (ref 1–4.8)
LYMPHOCYTES NFR BLD: 19.9 % (ref 18–48)
LYMPHOCYTES NFR BLD: 26.3 % (ref 18–48)
MAGNESIUM SERPL-MCNC: 1.7 MG/DL (ref 1.6–2.6)
MCH RBC QN AUTO: 22.9 PG (ref 27–31)
MCH RBC QN AUTO: 23.2 PG (ref 27–31)
MCHC RBC AUTO-ENTMCNC: 31.8 G/DL (ref 32–36)
MCHC RBC AUTO-ENTMCNC: 32.9 G/DL (ref 32–36)
MCV RBC AUTO: 70 FL (ref 82–98)
MCV RBC AUTO: 72 FL (ref 82–98)
MONOCYTES # BLD AUTO: 0.8 K/UL (ref 0.3–1)
MONOCYTES # BLD AUTO: 1.2 K/UL (ref 0.3–1)
MONOCYTES NFR BLD: 10.6 % (ref 4–15)
MONOCYTES NFR BLD: 7.1 % (ref 4–15)
NEUTROPHILS # BLD AUTO: 7.1 K/UL (ref 1.8–7.7)
NEUTROPHILS # BLD AUTO: 8.4 K/UL (ref 1.8–7.7)
NEUTROPHILS NFR BLD: 62.2 % (ref 38–73)
NEUTROPHILS NFR BLD: 72.2 % (ref 38–73)
NRBC BLD-RTO: 0 /100 WBC
NRBC BLD-RTO: 0 /100 WBC
PHOSPHATE SERPL-MCNC: 3.7 MG/DL (ref 2.7–4.5)
PLATELET # BLD AUTO: 165 K/UL (ref 150–450)
PLATELET # BLD AUTO: 172 K/UL (ref 150–450)
PMV BLD AUTO: 11.2 FL (ref 9.2–12.9)
PMV BLD AUTO: ABNORMAL FL (ref 9.2–12.9)
POTASSIUM SERPL-SCNC: 3.7 MMOL/L (ref 3.5–5.1)
POTASSIUM SERPL-SCNC: 4.2 MMOL/L (ref 3.5–5.1)
PROT SERPL-MCNC: 6.2 G/DL (ref 6–8.4)
PROT SERPL-MCNC: 7.2 G/DL (ref 6–8.4)
PROTHROMBIN TIME: 12.1 SEC (ref 9–12.5)
RBC # BLD AUTO: 5.78 M/UL (ref 4.6–6.2)
RBC # BLD AUTO: 6.51 M/UL (ref 4.6–6.2)
SODIUM SERPL-SCNC: 140 MMOL/L (ref 136–145)
SODIUM SERPL-SCNC: 140 MMOL/L (ref 136–145)
SPECIMEN OUTDATE: NORMAL
WBC # BLD AUTO: 11.4 K/UL (ref 3.9–12.7)
WBC # BLD AUTO: 11.68 K/UL (ref 3.9–12.7)

## 2023-05-27 PROCEDURE — 85025 COMPLETE CBC W/AUTO DIFF WBC: CPT | Performed by: EMERGENCY MEDICINE

## 2023-05-27 PROCEDURE — 83605 ASSAY OF LACTIC ACID: CPT | Performed by: EMERGENCY MEDICINE

## 2023-05-27 PROCEDURE — 84100 ASSAY OF PHOSPHORUS: CPT | Performed by: NURSE PRACTITIONER

## 2023-05-27 PROCEDURE — 99223 PR INITIAL HOSPITAL CARE,LEVL III: ICD-10-PCS | Mod: ,,, | Performed by: INTERNAL MEDICINE

## 2023-05-27 PROCEDURE — 96366 THER/PROPH/DIAG IV INF ADDON: CPT

## 2023-05-27 PROCEDURE — 96361 HYDRATE IV INFUSION ADD-ON: CPT

## 2023-05-27 PROCEDURE — 93005 ELECTROCARDIOGRAM TRACING: CPT

## 2023-05-27 PROCEDURE — G0378 HOSPITAL OBSERVATION PER HR: HCPCS

## 2023-05-27 PROCEDURE — 25000003 PHARM REV CODE 250: Performed by: EMERGENCY MEDICINE

## 2023-05-27 PROCEDURE — 25000003 PHARM REV CODE 250: Performed by: NURSE PRACTITIONER

## 2023-05-27 PROCEDURE — 85025 COMPLETE CBC W/AUTO DIFF WBC: CPT | Mod: 91 | Performed by: NURSE PRACTITIONER

## 2023-05-27 PROCEDURE — 80053 COMPREHEN METABOLIC PANEL: CPT | Performed by: EMERGENCY MEDICINE

## 2023-05-27 PROCEDURE — 99285 EMERGENCY DEPT VISIT HI MDM: CPT | Mod: 25

## 2023-05-27 PROCEDURE — 93010 ELECTROCARDIOGRAM REPORT: CPT | Mod: ,,, | Performed by: INTERNAL MEDICINE

## 2023-05-27 PROCEDURE — 36415 COLL VENOUS BLD VENIPUNCTURE: CPT | Performed by: NURSE PRACTITIONER

## 2023-05-27 PROCEDURE — 86900 BLOOD TYPING SEROLOGIC ABO: CPT | Performed by: HOSPITALIST

## 2023-05-27 PROCEDURE — 83735 ASSAY OF MAGNESIUM: CPT | Performed by: NURSE PRACTITIONER

## 2023-05-27 PROCEDURE — 85730 THROMBOPLASTIN TIME PARTIAL: CPT | Performed by: HOSPITALIST

## 2023-05-27 PROCEDURE — 80053 COMPREHEN METABOLIC PANEL: CPT | Mod: 91 | Performed by: NURSE PRACTITIONER

## 2023-05-27 PROCEDURE — 99223 PR INITIAL HOSPITAL CARE,LEVL III: ICD-10-PCS | Mod: GC,,, | Performed by: SURGERY

## 2023-05-27 PROCEDURE — 99223 1ST HOSP IP/OBS HIGH 75: CPT | Mod: ,,, | Performed by: INTERNAL MEDICINE

## 2023-05-27 PROCEDURE — 99223 1ST HOSP IP/OBS HIGH 75: CPT | Mod: GC,,, | Performed by: SURGERY

## 2023-05-27 PROCEDURE — 96365 THER/PROPH/DIAG IV INF INIT: CPT

## 2023-05-27 PROCEDURE — 63600175 PHARM REV CODE 636 W HCPCS: Performed by: NURSE PRACTITIONER

## 2023-05-27 PROCEDURE — 63600175 PHARM REV CODE 636 W HCPCS: Performed by: EMERGENCY MEDICINE

## 2023-05-27 PROCEDURE — 96374 THER/PROPH/DIAG INJ IV PUSH: CPT | Mod: 59

## 2023-05-27 PROCEDURE — 87040 BLOOD CULTURE FOR BACTERIA: CPT | Mod: 59 | Performed by: EMERGENCY MEDICINE

## 2023-05-27 PROCEDURE — 96375 TX/PRO/DX INJ NEW DRUG ADDON: CPT

## 2023-05-27 PROCEDURE — 93010 EKG 12-LEAD: ICD-10-PCS | Mod: ,,, | Performed by: INTERNAL MEDICINE

## 2023-05-27 PROCEDURE — 85610 PROTHROMBIN TIME: CPT | Performed by: HOSPITALIST

## 2023-05-27 RX ORDER — KETOROLAC TROMETHAMINE 30 MG/ML
15 INJECTION, SOLUTION INTRAMUSCULAR; INTRAVENOUS EVERY 6 HOURS PRN
Status: DISCONTINUED | OUTPATIENT
Start: 2023-05-27 | End: 2023-05-28 | Stop reason: HOSPADM

## 2023-05-27 RX ORDER — MAG HYDROX/ALUMINUM HYD/SIMETH 200-200-20
30 SUSPENSION, ORAL (FINAL DOSE FORM) ORAL EVERY 6 HOURS PRN
Status: DISCONTINUED | OUTPATIENT
Start: 2023-05-27 | End: 2023-05-28 | Stop reason: HOSPADM

## 2023-05-27 RX ORDER — ATORVASTATIN CALCIUM 40 MG/1
80 TABLET, FILM COATED ORAL DAILY
Status: DISCONTINUED | OUTPATIENT
Start: 2023-05-27 | End: 2023-05-28 | Stop reason: HOSPADM

## 2023-05-27 RX ORDER — OXYCODONE AND ACETAMINOPHEN 10; 325 MG/1; MG/1
1 TABLET ORAL EVERY 8 HOURS PRN
COMMUNITY

## 2023-05-27 RX ORDER — TALC
6 POWDER (GRAM) TOPICAL NIGHTLY PRN
Status: DISCONTINUED | OUTPATIENT
Start: 2023-05-27 | End: 2023-05-28 | Stop reason: HOSPADM

## 2023-05-27 RX ORDER — ONDANSETRON 2 MG/ML
4 INJECTION INTRAMUSCULAR; INTRAVENOUS
Status: COMPLETED | OUTPATIENT
Start: 2023-05-27 | End: 2023-05-27

## 2023-05-27 RX ORDER — LISINOPRIL 20 MG/1
40 TABLET ORAL DAILY
Status: DISCONTINUED | OUTPATIENT
Start: 2023-05-27 | End: 2023-05-28 | Stop reason: HOSPADM

## 2023-05-27 RX ORDER — AMLODIPINE BESYLATE 5 MG/1
10 TABLET ORAL DAILY
Status: DISCONTINUED | OUTPATIENT
Start: 2023-05-27 | End: 2023-05-28 | Stop reason: HOSPADM

## 2023-05-27 RX ORDER — NALOXONE HCL 0.4 MG/ML
0.02 VIAL (ML) INJECTION
Status: DISCONTINUED | OUTPATIENT
Start: 2023-05-27 | End: 2023-05-28 | Stop reason: HOSPADM

## 2023-05-27 RX ORDER — SODIUM CHLORIDE 0.9 % (FLUSH) 0.9 %
10 SYRINGE (ML) INJECTION EVERY 12 HOURS PRN
Status: DISCONTINUED | OUTPATIENT
Start: 2023-05-27 | End: 2023-05-28 | Stop reason: HOSPADM

## 2023-05-27 RX ORDER — SODIUM CHLORIDE 9 MG/ML
1000 INJECTION, SOLUTION INTRAVENOUS
Status: COMPLETED | OUTPATIENT
Start: 2023-05-27 | End: 2023-05-27

## 2023-05-27 RX ORDER — ATORVASTATIN CALCIUM 40 MG/1
80 TABLET, FILM COATED ORAL NIGHTLY
Status: DISCONTINUED | OUTPATIENT
Start: 2023-05-27 | End: 2023-05-27

## 2023-05-27 RX ORDER — CEFTRIAXONE 2 G/50ML
2 INJECTION, SOLUTION INTRAVENOUS
Status: DISCONTINUED | OUTPATIENT
Start: 2023-05-27 | End: 2023-05-27

## 2023-05-27 RX ORDER — SODIUM CHLORIDE 9 MG/ML
INJECTION, SOLUTION INTRAVENOUS CONTINUOUS
Status: DISCONTINUED | OUTPATIENT
Start: 2023-05-27 | End: 2023-05-28 | Stop reason: HOSPADM

## 2023-05-27 RX ORDER — METOPROLOL TARTRATE 50 MG/1
50 TABLET ORAL 2 TIMES DAILY
Status: DISCONTINUED | OUTPATIENT
Start: 2023-05-27 | End: 2023-05-28 | Stop reason: HOSPADM

## 2023-05-27 RX ORDER — HYDROCHLOROTHIAZIDE 12.5 MG/1
12.5 TABLET ORAL DAILY
Status: DISCONTINUED | OUTPATIENT
Start: 2023-05-27 | End: 2023-05-28 | Stop reason: HOSPADM

## 2023-05-27 RX ORDER — MORPHINE SULFATE 4 MG/ML
6 INJECTION, SOLUTION INTRAMUSCULAR; INTRAVENOUS
Status: COMPLETED | OUTPATIENT
Start: 2023-05-27 | End: 2023-05-27

## 2023-05-27 RX ORDER — ACETAMINOPHEN 325 MG/1
650 TABLET ORAL EVERY 6 HOURS PRN
Status: DISCONTINUED | OUTPATIENT
Start: 2023-05-27 | End: 2023-05-28 | Stop reason: HOSPADM

## 2023-05-27 RX ADMIN — PIPERACILLIN AND TAZOBACTAM 4.5 G: 4; .5 INJECTION, POWDER, LYOPHILIZED, FOR SOLUTION INTRAVENOUS; PARENTERAL at 03:05

## 2023-05-27 RX ADMIN — METOPROLOL TARTRATE 50 MG: 50 TABLET, FILM COATED ORAL at 09:05

## 2023-05-27 RX ADMIN — KETOROLAC TROMETHAMINE 15 MG: 30 INJECTION, SOLUTION INTRAMUSCULAR at 09:05

## 2023-05-27 RX ADMIN — LISINOPRIL 40 MG: 20 TABLET ORAL at 09:05

## 2023-05-27 RX ADMIN — SODIUM CHLORIDE: 9 INJECTION, SOLUTION INTRAVENOUS at 03:05

## 2023-05-27 RX ADMIN — ATORVASTATIN CALCIUM 80 MG: 40 TABLET, FILM COATED ORAL at 09:05

## 2023-05-27 RX ADMIN — SODIUM CHLORIDE 1000 ML: 9 INJECTION, SOLUTION INTRAVENOUS at 01:05

## 2023-05-27 RX ADMIN — HYDROCHLOROTHIAZIDE 12.5 MG: 12.5 TABLET ORAL at 09:05

## 2023-05-27 RX ADMIN — MORPHINE SULFATE 6 MG: 4 INJECTION, SOLUTION INTRAMUSCULAR; INTRAVENOUS at 01:05

## 2023-05-27 RX ADMIN — AMLODIPINE BESYLATE 10 MG: 5 TABLET ORAL at 09:05

## 2023-05-27 RX ADMIN — PIPERACILLIN AND TAZOBACTAM 4.5 G: 4; .5 INJECTION, POWDER, LYOPHILIZED, FOR SOLUTION INTRAVENOUS; PARENTERAL at 06:05

## 2023-05-27 RX ADMIN — METOPROLOL TARTRATE 50 MG: 50 TABLET, FILM COATED ORAL at 08:05

## 2023-05-27 RX ADMIN — ONDANSETRON 4 MG: 2 INJECTION INTRAMUSCULAR; INTRAVENOUS at 01:05
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
## 2023-05-27 NOTE — NURSING
[facility] [place]  Nurses Note -- 4 Eyes      5/27/2023       Skin assessed on: Admit      [] No Pressure Injuries Present    []Prevention Measures Documented    [] Yes LDA  for Pressure Injury Previously documented     [] Yes New Pressure Injury Discovered   [] LDA for New Pressure Injury Added      Attending RN:  Santiago Coles RN     Second RN:  Vannessa Prado Rn     Unable to complete full exam. Patient refuses sacrum skin assessment. Patient states he doesn't have any bed sores.

## 2023-05-27 NOTE — PLAN OF CARE
Problem: Adult Inpatient Plan of Care  Goal: Plan of Care Review  Outcome: Ongoing, Progressing     Problem: Adult Inpatient Plan of Care  Goal: Patient-Specific Goal (Individualized)  Outcome: Ongoing, Progressing     Problem: Adult Inpatient Plan of Care  Goal: Absence of Hospital-Acquired Illness or Injury  Outcome: Ongoing, Progressing  Intervention: Identify and Manage Fall Risk  Flowsheets (Taken 5/27/2023 1723)  Safety Promotion/Fall Prevention:   assistive device/personal item within reach   medications reviewed   nonskid shoes/socks when out of bed   side rails raised x 2  Intervention: Prevent Skin Injury  Flowsheets (Taken 5/27/2023 1723)  Body Position: position changed independently     Problem: Adult Inpatient Plan of Care  Goal: Absence of Hospital-Acquired Illness or Injury  Intervention: Identify and Manage Fall Risk  Flowsheets (Taken 5/27/2023 1723)  Safety Promotion/Fall Prevention:   assistive device/personal item within reach   medications reviewed   nonskid shoes/socks when out of bed   side rails raised x 2     Problem: Adult Inpatient Plan of Care  Goal: Absence of Hospital-Acquired Illness or Injury  Intervention: Prevent Skin Injury  Flowsheets (Taken 5/27/2023 1723)  Body Position: position changed independently     Problem: Adult Inpatient Plan of Care  Goal: Optimal Comfort and Wellbeing  Outcome: Ongoing, Progressing     Problem: Adult Inpatient Plan of Care  Goal: Readiness for Transition of Care  Outcome: Ongoing, Progressing

## 2023-05-27 NOTE — PLAN OF CARE
Case Management Assessment     PCP: Jj Montgomery MD  Pharmacy: [facility] [place]    Patient Arrived From: Home  Existing Help at Home: Mike Mtz-son    Barriers to Discharge: none    Discharge Plan:    A. home   B. Home with family     Per physician, GI consulted. CM to continue to follow for dc needs.       05/27/23 0858   Discharge Planning   Assessment Type Discharge Planning Brief Assessment   Resource/Environmental Concerns none   Support Systems Children   Equipment Currently Used at Home none   Current Living Arrangements home   Patient/Family Anticipates Transition to home   Patient/Family Anticipated Services at Transition none   DME Needed Upon Discharge  other (see comments)  (TBD)
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
## 2023-05-27 NOTE — NURSING
Patient arrived to floor at this time via stretcher from ed. Patient transferred to bed. Patient oriented to floor and room at this time. Basic setup placed at bedside. Vital signs are stable. Admit assessment completed. Unable to complete full skin assessment. Patient refuses. Vannessa Prado Rn at bedside. Iv fluids hung at this time. Patient educated on npo diet. Patient verbalizes an understanding. Sign placed on door. Tele box [id number] placed at this time. Patient has no complaints at this time. Instructed to call for any needs. Bed in lowest position. Call bell within reach.
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
## 2023-05-27 NOTE — SUBJECTIVE & OBJECTIVE
Past Medical History:   Diagnosis Date    Coronary artery disease     Emphysema of lung     Hyperlipidemia     Hypertension        Past Surgical History:   Procedure Laterality Date    CARDIAC CATHETERIZATION  2004    x2    HIP SURGERY         Review of patient's allergies indicates:   Allergen Reactions    Meperidine Other (See Comments)     Not specified.       Current Facility-Administered Medications on File Prior to Encounter   Medication    [COMPLETED] aluminum-magnesium hydroxide-simethicone 200-200-20 mg/5 mL suspension 30 mL    And    [COMPLETED] LIDOcaine HCl 2% oral solution 15 mL    [COMPLETED] cefTRIAXone (ROCEPHIN) 1 g in dextrose 5 % in water (D5W) 5 % 50 mL IVPB (MB+)    [COMPLETED] dicyclomine injection 20 mg    [COMPLETED] iohexoL (OMNIPAQUE 350) injection 100 mL    [COMPLETED] morphine injection 4 mg    [COMPLETED] ondansetron disintegrating tablet 8 mg    [COMPLETED] ondansetron injection 4 mg    [COMPLETED] pantoprazole injection 40 mg    [COMPLETED] piperacillin-tazobactam (ZOSYN) 4.5 g in dextrose 5 % in water (D5W) 5 % 100 mL IVPB (MB+)    [COMPLETED] sodium chloride 0.9% bolus 1,000 mL 1,000 mL     Current Outpatient Medications on File Prior to Encounter   Medication Sig    amLODIPine (NORVASC) 10 MG tablet Take 1 tablet (10 mg total) by mouth once daily.    atorvastatin (LIPITOR) 80 MG tablet Take 1 tablet (80 mg total) by mouth nightly.    cyproheptadine (PERIACTIN) 4 mg tablet Take 1 tablet (4 mg total) by mouth 3 (three) times daily.    diclofenac sodium (VOLTAREN) 1 % Gel Apply 2 g topically 2 (two) times daily. for 10 days    hydroCHLOROthiazide (MICROZIDE) 12.5 mg capsule Take 1 capsule (12.5 mg total) by mouth once daily.    LIDOcaine (LIDODERM) 5 % Place 1 patch onto the skin once daily. Remove & Discard patch within 12 hours or as directed by MD    lisinopriL (PRINIVIL,ZESTRIL) 40 MG tablet Take 1 tablet (40 mg total) by mouth once daily.    metoprolol tartrate (LOPRESSOR) 50 MG  tablet Take 1 tablet (50 mg total) by mouth 2 (two) times daily.    naproxen (NAPROSYN) 500 MG tablet Take 1 tablet (500 mg total) by mouth 2 (two) times daily with meals.     Family History       Problem Relation (Age of Onset)    Heart attack Mother    Heart disease Mother    Hypertension Mother          Tobacco Use    Smoking status: Former     Types: Cigarettes     Quit date: 1993     Years since quittin.4    Smokeless tobacco: Never   Substance and Sexual Activity    Alcohol use: Yes     Comment: 2-3/ week    Drug use: Yes     Types: Marijuana     Comment: every now and then    Sexual activity: Yes     Review of Systems   Constitutional:  Negative for chills and fever.   HENT:  Negative for congestion, postnasal drip and rhinorrhea.    Eyes:  Negative for visual disturbance.   Respiratory:  Negative for chest tightness and shortness of breath.    Gastrointestinal:  Positive for abdominal pain, nausea and vomiting.   Genitourinary:  Negative for difficulty urinating.   Musculoskeletal:  Negative for arthralgias and myalgias.   Skin:  Negative for color change.   Neurological:  Negative for dizziness, weakness and light-headedness.   Hematological:  Does not bruise/bleed easily.   Psychiatric/Behavioral:  Negative for agitation.    Objective:     Vital Signs (Most Recent):  Temp: 98 °F (36.7 °C) (23 0033)  Pulse: 80 (23 0033)  Resp: 18 (23 0151)  BP: (!) 158/94 (23)  SpO2: 99 % (23) Vital Signs (24h Range):  Temp:  [98 °F (36.7 °C)-98.4 °F (36.9 °C)] 98 °F (36.7 °C)  Pulse:  [72-94] 80  Resp:  [18-20] 18  SpO2:  [95 %-100 %] 99 %  BP: (146-169)/(77-97) 158/94     Weight: 88.5 kg (195 lb)  Body mass index is 28.8 kg/m².     Physical Exam  HENT:      Head: Normocephalic.      Nose: No congestion or rhinorrhea.      Mouth/Throat:      Mouth: Mucous membranes are moist.   Cardiovascular:      Rate and Rhythm: Normal rate.      Pulses: Normal pulses.   Pulmonary:       Effort: No respiratory distress.      Breath sounds: Normal breath sounds.   Abdominal:      General: Bowel sounds are normal.      Palpations: Abdomen is soft.   Musculoskeletal:      Cervical back: Normal range of motion and neck supple.      Right lower leg: No edema.      Left lower leg: No edema.   Skin:     General: Skin is warm and dry.      Comments: Large surgical scar on the abdomen with diffuse tenderness.  No rebound or guarding   Neurological:      Mental Status: He is alert and oriented to person, place, and time.   Psychiatric:         Mood and Affect: Mood normal.              Significant Labs: All pertinent labs within the past 24 hours have been reviewed.    Significant Imaging: I have reviewed all pertinent imaging results/findings within the past 24 hours.

## 2023-05-27 NOTE — NURSING
[facility]  Nurses Note -- 4 Eyes      5/27/2023       Skin assessed on: Q Shift      [x] No Pressure Injuries Present    []Prevention Measures Documented    [] Yes LDA  for Pressure Injury Previously documented     [] Yes New Pressure Injury Discovered   [] LDA for New Pressure Injury Added      Attending RN:  Gloria Nunez, RN     Second RN:  Cherrie Gallardo

## 2023-05-27 NOTE — ED TRIAGE NOTES
"Pt presents via EMS from [facility] for an ED to ED transfer for general surgery evaluation for small bowel obstruction dx. Pt states he's been having intermittent sharp abdominal pain accompanied by dark "black looking" stools "for awhile." Endorses N/V but denies diarrhea or bloody stools, changes/difficulty w/ urination, CP, or SOB.   "

## 2023-05-27 NOTE — ASSESSMENT & PLAN NOTE
Likely 2/2 UTI  On zosyn  Monitor labs   
Chronic   continue amlodipine, HCTZ, Metoprolol, lisinopril   Monitor vitals     
Continue statin     
Continue zosyn  Urine Cultures pending  Blood Cultures pending  IVFs        
Enteritis     SBO likely 2/2 enteritis  NPO  IVFs  Continue zosyn  Monitor labs  Consult GI and surgery   Prn antiemetics  Prn pain control     
Hyperlipidemia LDL goal < 130    Denies chest pain   Continue statin     
stable    
(4) rarely moist
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
## 2023-05-28 VITALS
WEIGHT: 196.63 LBS | HEART RATE: 65 BPM | DIASTOLIC BLOOD PRESSURE: 89 MMHG | OXYGEN SATURATION: 99 % | SYSTOLIC BLOOD PRESSURE: 168 MMHG | TEMPERATURE: 99 F | HEIGHT: 69 IN | RESPIRATION RATE: 18 BRPM | BODY MASS INDEX: 29.12 KG/M2

## 2023-05-28 PROCEDURE — 96376 TX/PRO/DX INJ SAME DRUG ADON: CPT

## 2023-05-28 PROCEDURE — 63600175 PHARM REV CODE 636 W HCPCS: Performed by: NURSE PRACTITIONER

## 2023-05-28 PROCEDURE — 96366 THER/PROPH/DIAG IV INF ADDON: CPT

## 2023-05-28 PROCEDURE — 99233 SBSQ HOSP IP/OBS HIGH 50: CPT | Mod: GC,,, | Performed by: SURGERY

## 2023-05-28 PROCEDURE — 25000003 PHARM REV CODE 250: Performed by: NURSE PRACTITIONER

## 2023-05-28 PROCEDURE — 96361 HYDRATE IV INFUSION ADD-ON: CPT

## 2023-05-28 PROCEDURE — G0378 HOSPITAL OBSERVATION PER HR: HCPCS

## 2023-05-28 PROCEDURE — 99233 PR SUBSEQUENT HOSPITAL CARE,LEVL III: ICD-10-PCS | Mod: GC,,, | Performed by: SURGERY

## 2023-05-28 RX ORDER — DOCUSATE SODIUM 100 MG/1
100 CAPSULE, LIQUID FILLED ORAL 2 TIMES DAILY PRN
Qty: 14 CAPSULE | Refills: 0 | Status: SHIPPED | OUTPATIENT
Start: 2023-05-28 | End: 2023-06-04

## 2023-05-28 RX ORDER — POLYETHYLENE GLYCOL 3350 17 G/17G
17 POWDER, FOR SOLUTION ORAL DAILY
Qty: 116 G | Refills: 0 | Status: SHIPPED | OUTPATIENT
Start: 2023-05-28

## 2023-05-28 RX ADMIN — AMLODIPINE BESYLATE 10 MG: 5 TABLET ORAL at 08:05

## 2023-05-28 RX ADMIN — PIPERACILLIN AND TAZOBACTAM 4.5 G: 4; .5 INJECTION, POWDER, LYOPHILIZED, FOR SOLUTION INTRAVENOUS; PARENTERAL at 12:05

## 2023-05-28 RX ADMIN — ATORVASTATIN CALCIUM 80 MG: 40 TABLET, FILM COATED ORAL at 08:05

## 2023-05-28 RX ADMIN — PIPERACILLIN AND TAZOBACTAM 4.5 G: 4; .5 INJECTION, POWDER, LYOPHILIZED, FOR SOLUTION INTRAVENOUS; PARENTERAL at 06:05

## 2023-05-28 RX ADMIN — LISINOPRIL 40 MG: 20 TABLET ORAL at 08:05

## 2023-05-28 RX ADMIN — SODIUM CHLORIDE: 9 INJECTION, SOLUTION INTRAVENOUS at 12:05

## 2023-05-28 RX ADMIN — KETOROLAC TROMETHAMINE 15 MG: 30 INJECTION, SOLUTION INTRAMUSCULAR at 03:05

## 2023-05-28 RX ADMIN — HYDROCHLOROTHIAZIDE 12.5 MG: 12.5 TABLET ORAL at 08:05

## 2023-05-28 RX ADMIN — METOPROLOL TARTRATE 50 MG: 50 TABLET, FILM COATED ORAL at 08:05

## 2023-05-28 NOTE — PLAN OF CARE
TN sent a secure chat to med surg nurse Cherrie that this patient is cleared for discharge from case management's viewpoint.   05/28/23 0942   Final Note   Assessment Type Final Discharge Note   Anticipated Discharge Disposition Home   What phone number can be called within the next 1-3 days to see how you are doing after discharge?   (see chart)   Hospital Resources/Appts/Education Provided Provided patient/caregiver with written discharge plan information;Community resources provided   Post-Acute Status   Discharge Delays None known at this time
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
## 2023-05-28 NOTE — PROGRESS NOTES
General surgery progress note:    Patient seen and examined.  States he is feeling much better today.  He has had multiple bowel movements and is tolerating liquids at this time.  States he is hungry and would like to go home.  GGC performed and passed without issues.    Recommendations:  - Does not have a bowel obstruction at this time  - Regular diet  - If tolerating regular diet ok to be discharged from a surgical perspective  - Rest of care per primary team    Kee Hummel MD - PGY IV  General Surgery

## 2023-05-28 NOTE — NURSING
Normal V/S, Discharge instruction explained and patient verbalized understanding. Cardiac Monitor and IV access removed. Pt tolerated regular diet. No nausea and vomiting noted.

## 2023-05-28 NOTE — PLAN OF CARE
Problem: Adult Inpatient Plan of Care  Goal: Plan of Care Review  Outcome: Met  Flowsheets (Taken 5/28/2023 1007)  Plan of Care Reviewed With: patient  Goal: Patient-Specific Goal (Individualized)  Outcome: Met  Goal: Absence of Hospital-Acquired Illness or Injury  Outcome: Met  Goal: Optimal Comfort and Wellbeing  Outcome: Met  Goal: Readiness for Transition of Care  Outcome: Met

## 2023-05-31 LAB
BACTERIA BLD CULT: NORMAL
BACTERIA BLD CULT: NORMAL

## 2023-06-05 ENCOUNTER — PATIENT OUTREACH (OUTPATIENT)
Dept: ADMINISTRATIVE | Facility: HOSPITAL | Age: 68
End: 2023-06-05
Payer: MEDICARE

## 2023-06-05 ENCOUNTER — PATIENT MESSAGE (OUTPATIENT)
Dept: ADMINISTRATIVE | Facility: HOSPITAL | Age: 68
End: 2023-06-05
Payer: MEDICARE

## 2023-06-19 ENCOUNTER — OFFICE VISIT (OUTPATIENT)
Dept: INTERNAL MEDICINE | Facility: CLINIC | Age: 68
End: 2023-06-19
Attending: FAMILY MEDICINE
Payer: MEDICARE

## 2023-06-19 VITALS
WEIGHT: 198.88 LBS | HEIGHT: 69 IN | OXYGEN SATURATION: 98 % | HEART RATE: 83 BPM | BODY MASS INDEX: 29.46 KG/M2 | SYSTOLIC BLOOD PRESSURE: 134 MMHG | DIASTOLIC BLOOD PRESSURE: 62 MMHG

## 2023-06-19 DIAGNOSIS — I10 PRIMARY HYPERTENSION: ICD-10-CM

## 2023-06-19 DIAGNOSIS — K21.9 GASTROESOPHAGEAL REFLUX DISEASE, UNSPECIFIED WHETHER ESOPHAGITIS PRESENT: ICD-10-CM

## 2023-06-19 DIAGNOSIS — K56.609 SBO (SMALL BOWEL OBSTRUCTION): Primary | ICD-10-CM

## 2023-06-19 PROCEDURE — 99999 PR PBB SHADOW E&M-EST. PATIENT-LVL III: ICD-10-PCS | Mod: PBBFAC,,, | Performed by: FAMILY MEDICINE

## 2023-06-19 PROCEDURE — 3288F FALL RISK ASSESSMENT DOCD: CPT | Mod: CPTII,S$GLB,, | Performed by: FAMILY MEDICINE

## 2023-06-19 PROCEDURE — 4010F PR ACE/ARB THEARPY RXD/TAKEN: ICD-10-PCS | Mod: CPTII,S$GLB,, | Performed by: FAMILY MEDICINE

## 2023-06-19 PROCEDURE — 1160F RVW MEDS BY RX/DR IN RCRD: CPT | Mod: CPTII,S$GLB,, | Performed by: FAMILY MEDICINE

## 2023-06-19 PROCEDURE — 1125F PR PAIN SEVERITY QUANTIFIED, PAIN PRESENT: ICD-10-PCS | Mod: CPTII,S$GLB,, | Performed by: FAMILY MEDICINE

## 2023-06-19 PROCEDURE — 3078F PR MOST RECENT DIASTOLIC BLOOD PRESSURE < 80 MM HG: ICD-10-PCS | Mod: CPTII,S$GLB,, | Performed by: FAMILY MEDICINE

## 2023-06-19 PROCEDURE — 99214 OFFICE O/P EST MOD 30 MIN: CPT | Mod: S$GLB,,, | Performed by: FAMILY MEDICINE

## 2023-06-19 PROCEDURE — 1101F PT FALLS ASSESS-DOCD LE1/YR: CPT | Mod: CPTII,S$GLB,, | Performed by: FAMILY MEDICINE

## 2023-06-19 PROCEDURE — 1101F PR PT FALLS ASSESS DOC 0-1 FALLS W/OUT INJ PAST YR: ICD-10-PCS | Mod: CPTII,S$GLB,, | Performed by: FAMILY MEDICINE

## 2023-06-19 PROCEDURE — 1159F MED LIST DOCD IN RCRD: CPT | Mod: CPTII,S$GLB,, | Performed by: FAMILY MEDICINE

## 2023-06-19 PROCEDURE — 3075F PR MOST RECENT SYSTOLIC BLOOD PRESS GE 130-139MM HG: ICD-10-PCS | Mod: CPTII,S$GLB,, | Performed by: FAMILY MEDICINE

## 2023-06-19 PROCEDURE — 3075F SYST BP GE 130 - 139MM HG: CPT | Mod: CPTII,S$GLB,, | Performed by: FAMILY MEDICINE

## 2023-06-19 PROCEDURE — 3078F DIAST BP <80 MM HG: CPT | Mod: CPTII,S$GLB,, | Performed by: FAMILY MEDICINE

## 2023-06-19 PROCEDURE — 4010F ACE/ARB THERAPY RXD/TAKEN: CPT | Mod: CPTII,S$GLB,, | Performed by: FAMILY MEDICINE

## 2023-06-19 PROCEDURE — 3008F PR BODY MASS INDEX (BMI) DOCUMENTED: ICD-10-PCS | Mod: CPTII,S$GLB,, | Performed by: FAMILY MEDICINE

## 2023-06-19 PROCEDURE — 1125F AMNT PAIN NOTED PAIN PRSNT: CPT | Mod: CPTII,S$GLB,, | Performed by: FAMILY MEDICINE

## 2023-06-19 PROCEDURE — 3008F BODY MASS INDEX DOCD: CPT | Mod: CPTII,S$GLB,, | Performed by: FAMILY MEDICINE

## 2023-06-19 PROCEDURE — 99214 PR OFFICE/OUTPT VISIT, EST, LEVL IV, 30-39 MIN: ICD-10-PCS | Mod: S$GLB,,, | Performed by: FAMILY MEDICINE

## 2023-06-19 PROCEDURE — 1160F PR REVIEW ALL MEDS BY PRESCRIBER/CLIN PHARMACIST DOCUMENTED: ICD-10-PCS | Mod: CPTII,S$GLB,, | Performed by: FAMILY MEDICINE

## 2023-06-19 PROCEDURE — 1159F PR MEDICATION LIST DOCUMENTED IN MEDICAL RECORD: ICD-10-PCS | Mod: CPTII,S$GLB,, | Performed by: FAMILY MEDICINE

## 2023-06-19 PROCEDURE — 99999 PR PBB SHADOW E&M-EST. PATIENT-LVL III: CPT | Mod: PBBFAC,,, | Performed by: FAMILY MEDICINE

## 2023-06-19 PROCEDURE — 3288F PR FALLS RISK ASSESSMENT DOCUMENTED: ICD-10-PCS | Mod: CPTII,S$GLB,, | Performed by: FAMILY MEDICINE

## 2023-06-19 RX ORDER — OMEPRAZOLE 40 MG/1
40 CAPSULE, DELAYED RELEASE ORAL DAILY
Qty: 90 CAPSULE | Refills: 3 | Status: SHIPPED | OUTPATIENT
Start: 2023-06-19 | End: 2024-06-18

## 2023-06-19 NOTE — PROGRESS NOTES
"CHIEF COMPLAINT: Follow-up SBO w/hypertension and hyperlipidemia    HISTORY OF PRESENT ILLNESS: The patient is a 68 -year-old white male.  SBO resolved with conservative Tx    The patient has chronic back pain.  He can no longer see his previous pain management physician due to insurance reasons.      His girlfriend remarks that he has been snoring for a long time but it is getting worse recently.    The patient has a history of stable hypertension on current medications.  Patient denies chest pain or shortness of breath today.    The patient has a history of stable hyperlipidemia on current medications.  The patient denies chest pain or shortness of breath today.  The patient denies muscle aches or myalgias suggestive of myositis.    REVIEW OF SYSTEMS:  GENERAL: No fever, chills, fatigability or weight loss.  SKIN: No rashes, itching or changes in color or texture of skin.  HEAD: No headaches or recent head trauma.  EYES: Visual acuity fine. No photophobia, ocular pain or diplopia.  EARS: Denies ear pain, discharge or vertigo.  NOSE: No loss of smell, no epistaxis or postnasal drip.  MOUTH & THROAT: No hoarseness or change in voice. No excessive gum bleeding.  NODES: Denies swollen glands.  CHEST: Denies RODARTE, cyanosis, wheezing, cough and sputum production.  CARDIOVASCULAR: Denies chest pain, PND, orthopnea or reduced exercise tolerance.  ABDOMEN: Appetite fine. No weight loss. Denies diarrhea, abdominal pain, hematemesis or blood in stool.  URINARY: No flank pain, dysuria or hematuria.  PERIPHERAL VASCULAR: No claudication or cyanosis.  MUSCULOSKELETAL: No joint stiffness or swelling.   NEUROLOGIC: No history of seizures, paralysis, alteration of gait or coordination.    SOCIAL HISTORY: The patient does not smoke.  The patient consumes alcohol socially.      PHYSICAL EXAMINATION:   Blood pressure 134/62, pulse 83, height 5' 9" (1.753 m), weight 90.2 kg (198 lb 13.7 oz), SpO2 98 %.    APPEARANCE: Well nourished, " well developed, in no acute distress.    HEAD: Normocephalic, atraumatic.  EYES: PERRL. EOMI.  Conjunctivae without injection and  anicteric  NOSE: Mucosa pink. Airway clear.  MOUTH & THROAT: No tonsillar enlargement. No pharyngeal erythema or exudate. No stridor.  NECK: Supple.   NODES: No cervical, axillary or inguinal lymph node enlargement.  CHEST: Lungs clear to auscultation.  No retractions are noted.  No rales or rhonchi are present.  CARDIOVASCULAR: Normal S1, S2. No rubs, murmurs or gallops.  ABDOMEN: Bowel sounds normal. Not distended. Soft. No tenderness or masses.  No ascites is noted.  MUSCULOSKELETAL:  There is no clubbing, cyanosis, or edema of the extremities x4.  There is full range of motion of the lumbar spine.  There is full range of motion of the extremities x4.  There is no deformity noted.    NEUROLOGIC:       Normal speech development.      Hearing normal.      Normal gait.      Motor and sensory exams grossly normal.  PSYCHIATRIC: Patient is alert and oriented x3.  Thought processes are all normal.  There is no homicidality.  There is no suicidality.  There is no evidence of psychosis.    LABORATORY/RADIOLOGY:   Chart reviewed.      ASSESSMENT:   SBO, resolved  Snoring likely KIRA  Chronic low back pain  Hypertension  Hyperlipidemia   Insomnia      PLAN:  His recent blood work looked good  Ophthalmology referral  Sleep clinic referral  Trazodone 100 qhs  Return to clinic next year.

## 2023-08-08 NOTE — TELEPHONE ENCOUNTER
Left a detailed message for the pt informing him per Dr. Nava try OTC Flonase sinus allergy or any sinus medication.   Anesthesia Type: 1% lidocaine with epinephrine

## 2023-08-15 ENCOUNTER — TELEPHONE (OUTPATIENT)
Dept: INTERNAL MEDICINE | Facility: CLINIC | Age: 68
End: 2023-08-15
Payer: MEDICARE

## 2023-08-15 ENCOUNTER — OFFICE VISIT (OUTPATIENT)
Dept: INTERNAL MEDICINE | Facility: CLINIC | Age: 68
End: 2023-08-15
Attending: FAMILY MEDICINE
Payer: MEDICARE

## 2023-08-15 VITALS
SYSTOLIC BLOOD PRESSURE: 130 MMHG | WEIGHT: 198.94 LBS | HEIGHT: 69 IN | DIASTOLIC BLOOD PRESSURE: 70 MMHG | BODY MASS INDEX: 29.47 KG/M2 | OXYGEN SATURATION: 98 % | HEART RATE: 76 BPM

## 2023-08-15 DIAGNOSIS — I10 PRIMARY HYPERTENSION: ICD-10-CM

## 2023-08-15 DIAGNOSIS — N28.89 BILATERAL RENAL MASSES: Primary | ICD-10-CM

## 2023-08-15 DIAGNOSIS — Z12.11 ENCOUNTER FOR COLORECTAL CANCER SCREENING: ICD-10-CM

## 2023-08-15 DIAGNOSIS — Z98.61 CAD S/P PERCUTANEOUS CORONARY ANGIOPLASTY: ICD-10-CM

## 2023-08-15 DIAGNOSIS — Z12.12 ENCOUNTER FOR COLORECTAL CANCER SCREENING: ICD-10-CM

## 2023-08-15 DIAGNOSIS — I25.10 CAD S/P PERCUTANEOUS CORONARY ANGIOPLASTY: ICD-10-CM

## 2023-08-15 PROCEDURE — 1159F PR MEDICATION LIST DOCUMENTED IN MEDICAL RECORD: ICD-10-PCS | Mod: CPTII,S$GLB,, | Performed by: FAMILY MEDICINE

## 2023-08-15 PROCEDURE — 4010F PR ACE/ARB THEARPY RXD/TAKEN: ICD-10-PCS | Mod: CPTII,S$GLB,, | Performed by: FAMILY MEDICINE

## 2023-08-15 PROCEDURE — 99999 PR PBB SHADOW E&M-EST. PATIENT-LVL III: CPT | Mod: PBBFAC,,, | Performed by: FAMILY MEDICINE

## 2023-08-15 PROCEDURE — 3075F SYST BP GE 130 - 139MM HG: CPT | Mod: CPTII,S$GLB,, | Performed by: FAMILY MEDICINE

## 2023-08-15 PROCEDURE — 4010F ACE/ARB THERAPY RXD/TAKEN: CPT | Mod: CPTII,S$GLB,, | Performed by: FAMILY MEDICINE

## 2023-08-15 PROCEDURE — 1159F MED LIST DOCD IN RCRD: CPT | Mod: CPTII,S$GLB,, | Performed by: FAMILY MEDICINE

## 2023-08-15 PROCEDURE — 3075F PR MOST RECENT SYSTOLIC BLOOD PRESS GE 130-139MM HG: ICD-10-PCS | Mod: CPTII,S$GLB,, | Performed by: FAMILY MEDICINE

## 2023-08-15 PROCEDURE — 1125F PR PAIN SEVERITY QUANTIFIED, PAIN PRESENT: ICD-10-PCS | Mod: CPTII,S$GLB,, | Performed by: FAMILY MEDICINE

## 2023-08-15 PROCEDURE — 3288F FALL RISK ASSESSMENT DOCD: CPT | Mod: CPTII,S$GLB,, | Performed by: FAMILY MEDICINE

## 2023-08-15 PROCEDURE — 1101F PR PT FALLS ASSESS DOC 0-1 FALLS W/OUT INJ PAST YR: ICD-10-PCS | Mod: CPTII,S$GLB,, | Performed by: FAMILY MEDICINE

## 2023-08-15 PROCEDURE — 1101F PT FALLS ASSESS-DOCD LE1/YR: CPT | Mod: CPTII,S$GLB,, | Performed by: FAMILY MEDICINE

## 2023-08-15 PROCEDURE — 1160F RVW MEDS BY RX/DR IN RCRD: CPT | Mod: CPTII,S$GLB,, | Performed by: FAMILY MEDICINE

## 2023-08-15 PROCEDURE — 3008F PR BODY MASS INDEX (BMI) DOCUMENTED: ICD-10-PCS | Mod: CPTII,S$GLB,, | Performed by: FAMILY MEDICINE

## 2023-08-15 PROCEDURE — 99999 PR PBB SHADOW E&M-EST. PATIENT-LVL III: ICD-10-PCS | Mod: PBBFAC,,, | Performed by: FAMILY MEDICINE

## 2023-08-15 PROCEDURE — 3078F PR MOST RECENT DIASTOLIC BLOOD PRESSURE < 80 MM HG: ICD-10-PCS | Mod: CPTII,S$GLB,, | Performed by: FAMILY MEDICINE

## 2023-08-15 PROCEDURE — 3078F DIAST BP <80 MM HG: CPT | Mod: CPTII,S$GLB,, | Performed by: FAMILY MEDICINE

## 2023-08-15 PROCEDURE — 3288F PR FALLS RISK ASSESSMENT DOCUMENTED: ICD-10-PCS | Mod: CPTII,S$GLB,, | Performed by: FAMILY MEDICINE

## 2023-08-15 PROCEDURE — 99214 PR OFFICE/OUTPT VISIT, EST, LEVL IV, 30-39 MIN: ICD-10-PCS | Mod: S$GLB,,, | Performed by: FAMILY MEDICINE

## 2023-08-15 PROCEDURE — 3008F BODY MASS INDEX DOCD: CPT | Mod: CPTII,S$GLB,, | Performed by: FAMILY MEDICINE

## 2023-08-15 PROCEDURE — 1125F AMNT PAIN NOTED PAIN PRSNT: CPT | Mod: CPTII,S$GLB,, | Performed by: FAMILY MEDICINE

## 2023-08-15 PROCEDURE — 99214 OFFICE O/P EST MOD 30 MIN: CPT | Mod: S$GLB,,, | Performed by: FAMILY MEDICINE

## 2023-08-15 PROCEDURE — 1160F PR REVIEW ALL MEDS BY PRESCRIBER/CLIN PHARMACIST DOCUMENTED: ICD-10-PCS | Mod: CPTII,S$GLB,, | Performed by: FAMILY MEDICINE

## 2023-08-15 NOTE — PROGRESS NOTES
CHIEF COMPLAINT: Follow-up MRI w/hypertension and hyperlipidemia    HISTORY OF PRESENT ILLNESS: The patient is a 68 -year-old white male.  Fall 6/23 resulted in him ultimately getting an MRI 7/23.  Multiple bilateral renal cysts both simple and complex were seen.    SBO resolved with conservative Tx    The patient has chronic back pain.  He can no longer see his previous pain management physician due to insurance reasons.      His girlfriend remarks that he has been snoring for a long time but it is getting worse recently.    The patient has a history of stable hypertension on current medications.  Patient denies chest pain or shortness of breath today.    The patient has a history of stable hyperlipidemia on current medications.  The patient denies chest pain or shortness of breath today.  The patient denies muscle aches or myalgias suggestive of myositis.    REVIEW OF SYSTEMS:  GENERAL: No fever, chills, fatigability or weight loss.  SKIN: No rashes, itching or changes in color or texture of skin.  HEAD: No headaches or recent head trauma.  EYES: Visual acuity fine. No photophobia, ocular pain or diplopia.  EARS: Denies ear pain, discharge or vertigo.  NOSE: No loss of smell, no epistaxis or postnasal drip.  MOUTH & THROAT: No hoarseness or change in voice. No excessive gum bleeding.  NODES: Denies swollen glands.  CHEST: Denies RODARTE, cyanosis, wheezing, cough and sputum production.  CARDIOVASCULAR: Denies chest pain, PND, orthopnea or reduced exercise tolerance.  ABDOMEN: Appetite fine. No weight loss. Denies diarrhea, abdominal pain, hematemesis or blood in stool.  URINARY: No flank pain, dysuria or hematuria.  PERIPHERAL VASCULAR: No claudication or cyanosis.  MUSCULOSKELETAL: No joint stiffness or swelling.   NEUROLOGIC: No history of seizures, paralysis, alteration of gait or coordination.    SOCIAL HISTORY: The patient does not smoke.  The patient consumes alcohol socially.      PHYSICAL EXAMINATION:   Blood  "pressure 130/70, pulse 76, height 5' 9" (1.753 m), weight 90.2 kg (198 lb 15.4 oz), SpO2 98 %.    APPEARANCE: Well nourished, well developed, in no acute distress.    HEAD: Normocephalic, atraumatic.  EYES: PERRL. EOMI.  Conjunctivae without injection and  anicteric  NOSE: Mucosa pink. Airway clear.  MOUTH & THROAT: No tonsillar enlargement. No pharyngeal erythema or exudate. No stridor.  NECK: Supple.   NODES: No cervical, axillary or inguinal lymph node enlargement.  CHEST: Lungs clear to auscultation.  No retractions are noted.  No rales or rhonchi are present.  CARDIOVASCULAR: Normal S1, S2. No rubs, murmurs or gallops.  ABDOMEN: Bowel sounds normal. Not distended. Soft. No tenderness or masses.  No ascites is noted.  MUSCULOSKELETAL:  There is no clubbing, cyanosis, or edema of the extremities x4.  There is full range of motion of the lumbar spine.  There is full range of motion of the extremities x4.  There is no deformity noted.    NEUROLOGIC:       Normal speech development.      Hearing normal.      Normal gait.      Motor and sensory exams grossly normal.  PSYCHIATRIC: Patient is alert and oriented x3.  Thought processes are all normal.  There is no homicidality.  There is no suicidality.  There is no evidence of psychosis.    LABORATORY/RADIOLOGY:   Chart reviewed.      ASSESSMENT:   Multiple bilateral renal cysts  SBO, resolved  Snoring likely KIRA  Chronic low back pain  Hypertension  Hyperlipidemia   Insomnia      PLAN:  Ultrasound ordered  His recent blood work looked good  Ophthalmology referral  Sleep clinic referral  Trazodone 100 qhs  Return to clinic next year.        "

## 2023-08-28 PROBLEM — N39.0 UTI (URINARY TRACT INFECTION): Status: RESOLVED | Noted: 2023-05-27 | Resolved: 2023-08-28

## 2023-09-06 LAB — NONINV COLON CA DNA+OCC BLD SCRN STL QL: NEGATIVE

## 2023-10-12 DIAGNOSIS — E78.5 HYPERLIPIDEMIA LDL GOAL <100: ICD-10-CM

## 2023-10-12 RX ORDER — ATORVASTATIN CALCIUM 80 MG/1
80 TABLET, FILM COATED ORAL NIGHTLY
Qty: 90 TABLET | Refills: 0 | Status: SHIPPED | OUTPATIENT
Start: 2023-10-12 | End: 2024-01-19

## 2023-10-12 NOTE — TELEPHONE ENCOUNTER
Refill Routing Note   Medication(s) are not appropriate for processing by [facility] for the following reason(s):      Required labs outdated    [facility] action(s):  Defer Care Due:  Labs due            Appointments  past 12m or future 3m with PCP    Date Provider   Last Visit   8/15/2023 Jj Montgomery MD   Next Visit   Visit date not found Jj Montgomery MD   ED visits in past 90 days: 0        Note composed:12:16 PM 10/12/2023

## 2023-10-12 NOTE — TELEPHONE ENCOUNTER
Care Due:                  Date            Visit Type   Department     Provider  --------------------------------------------------------------------------------                                EP -                              PRIMARY      [facility] INTERNAL  Jj Ugo  Last Visit: 08-      [facility] ([facility])   [facility]  Next Visit: None Scheduled  None         None Found                                                            Last  Test          Frequency    Reason                     Performed    Due Date  --------------------------------------------------------------------------------    Lipid Panel.  12 months..  atorvastatin.............  Not Found    Overdue    [employer] Embedded Care Due Messages. Reference number: [id number].   10/12/2023 10:58:42 AM CDT

## 2024-01-18 DIAGNOSIS — E78.5 HYPERLIPIDEMIA LDL GOAL <100: ICD-10-CM

## 2024-01-18 NOTE — TELEPHONE ENCOUNTER
Care Due:                  Date            Visit Type   Department     Provider  --------------------------------------------------------------------------------                                EP -                              PRIMARY      [facility] INTERNAL  Jj Ugo  Last Visit: 08-      [facility] ([facility])   [facility]  Next Visit: None Scheduled  None         None Found                                                            Last  Test          Frequency    Reason                     Performed    Due Date  --------------------------------------------------------------------------------    Lipid Panel.  12 months..  atorvastatin.............  Not Found    Overdue    [employer] Embedded Care Due Messages. Reference number: [id number].   1/18/2024 10:24:49 AM CST

## 2024-01-19 RX ORDER — ATORVASTATIN CALCIUM 80 MG/1
80 TABLET, FILM COATED ORAL NIGHTLY
Qty: 90 TABLET | Refills: 0 | Status: SHIPPED | OUTPATIENT
Start: 2024-01-19 | End: 2024-05-02

## 2024-01-19 NOTE — TELEPHONE ENCOUNTER
Refill Routing Note   Medication(s) are not appropriate for processing by [facility] for the following reason(s):        Required labs outdated    [facility] action(s):  Defer     Requires labs : Yes             Appointments  past 12m or future 3m with PCP    Date Provider   Last Visit   8/15/2023 Jj Montgomery MD   Next Visit   Visit date not found Jj Montgomery MD   ED visits in past 90 days: 0        Note composed:7:31 PM 01/18/2024

## 2024-05-02 DIAGNOSIS — E78.5 HYPERLIPIDEMIA LDL GOAL <100: ICD-10-CM

## 2024-05-02 RX ORDER — ATORVASTATIN CALCIUM 80 MG/1
TABLET, FILM COATED ORAL
Qty: 90 TABLET | Refills: 3 | Status: SHIPPED | OUTPATIENT
Start: 2024-05-02

## 2024-05-02 NOTE — TELEPHONE ENCOUNTER
Refill Routing Note   Medication(s) are not appropriate for processing by [facility] for the following reason(s):        Required labs outdated    [facility] action(s):  Defer     Requires labs : Yes             Appointments  past 12m or future 3m with PCP    Date Provider   Last Visit   8/15/2023 Jj Montgomery MD   Next Visit   6/19/2024 Jj Montgomery MD   ED visits in past 90 days: 0        Note composed:3:05 PM 05/02/2024

## 2024-05-02 NOTE — TELEPHONE ENCOUNTER
Care Due:                  Date            Visit Type   Department     Provider  --------------------------------------------------------------------------------                                EP -                              [facility] INTERNAL  Jj Arizmendi  Last Visit: 08-      CARE ([facility])   MEDICINE       [place]                              EP -                              [facility] INTERNAL  Jj Arizmendi  Next Visit: 06-      [facility] ([facility])   [facility]                                                            Last  Test          Frequency    Reason                     Performed    Due Date  --------------------------------------------------------------------------------    CMP.........  12 months..  atorvastatin,              05- 05-                             hydroCHLOROthiazide,                             lisinopriL...............    Lipid Panel.  12 months..  atorvastatin.............  12-   12-    [facility] Embedded Care Due Messages. Reference number: [id number].   5/02/2024 11:11:09 AM CDT

## 2024-06-19 ENCOUNTER — OFFICE VISIT (OUTPATIENT)
Dept: INTERNAL MEDICINE | Facility: CLINIC | Age: 69
End: 2024-06-19
Attending: FAMILY MEDICINE
Payer: MEDICARE

## 2024-06-19 ENCOUNTER — LAB VISIT (OUTPATIENT)
Dept: LAB | Facility: OTHER | Age: 69
End: 2024-06-19
Attending: FAMILY MEDICINE
Payer: MEDICARE

## 2024-06-19 VITALS
DIASTOLIC BLOOD PRESSURE: 82 MMHG | SYSTOLIC BLOOD PRESSURE: 138 MMHG | WEIGHT: 202.19 LBS | OXYGEN SATURATION: 96 % | BODY MASS INDEX: 29.95 KG/M2 | HEART RATE: 72 BPM | HEIGHT: 69 IN

## 2024-06-19 DIAGNOSIS — R79.9 ABNORMAL FINDING OF BLOOD CHEMISTRY, UNSPECIFIED: ICD-10-CM

## 2024-06-19 DIAGNOSIS — I25.10 CAD S/P PERCUTANEOUS CORONARY ANGIOPLASTY: ICD-10-CM

## 2024-06-19 DIAGNOSIS — J43.9 PULMONARY EMPHYSEMA, UNSPECIFIED EMPHYSEMA TYPE: ICD-10-CM

## 2024-06-19 DIAGNOSIS — Z98.61 CAD S/P PERCUTANEOUS CORONARY ANGIOPLASTY: ICD-10-CM

## 2024-06-19 DIAGNOSIS — I70.0 AORTIC ATHEROSCLEROSIS: ICD-10-CM

## 2024-06-19 DIAGNOSIS — Z00.00 ANNUAL PHYSICAL EXAM: ICD-10-CM

## 2024-06-19 DIAGNOSIS — Z00.00 ANNUAL PHYSICAL EXAM: Primary | ICD-10-CM

## 2024-06-19 DIAGNOSIS — I10 PRIMARY HYPERTENSION: ICD-10-CM

## 2024-06-19 LAB
ALBUMIN SERPL BCP-MCNC: 3.9 G/DL (ref 3.5–5.2)
ALP SERPL-CCNC: 56 U/L (ref 55–135)
ALT SERPL W/O P-5'-P-CCNC: 24 U/L (ref 10–44)
ANION GAP SERPL CALC-SCNC: 11 MMOL/L (ref 8–16)
AST SERPL-CCNC: 24 U/L (ref 10–40)
BILIRUB SERPL-MCNC: 0.8 MG/DL (ref 0.1–1)
BUN SERPL-MCNC: 17 MG/DL (ref 8–23)
CALCIUM SERPL-MCNC: 9.3 MG/DL (ref 8.7–10.5)
CHLORIDE SERPL-SCNC: 109 MMOL/L (ref 95–110)
CHOLEST SERPL-MCNC: 118 MG/DL (ref 120–199)
CHOLEST/HDLC SERPL: 3.2 {RATIO} (ref 2–5)
CO2 SERPL-SCNC: 20 MMOL/L (ref 23–29)
CREAT SERPL-MCNC: 1.1 MG/DL (ref 0.5–1.4)
EST. GFR  (NO RACE VARIABLE): >60 ML/MIN/1.73 M^2
ESTIMATED AVG GLUCOSE: 120 MG/DL (ref 68–131)
GLUCOSE SERPL-MCNC: 89 MG/DL (ref 70–110)
HBA1C MFR BLD: 5.8 % (ref 4–5.6)
HDLC SERPL-MCNC: 37 MG/DL (ref 40–75)
HDLC SERPL: 31.4 % (ref 20–50)
LDLC SERPL CALC-MCNC: 67.4 MG/DL (ref 63–159)
NONHDLC SERPL-MCNC: 81 MG/DL
POTASSIUM SERPL-SCNC: 4.3 MMOL/L (ref 3.5–5.1)
PROT SERPL-MCNC: 7.3 G/DL (ref 6–8.4)
SODIUM SERPL-SCNC: 140 MMOL/L (ref 136–145)
TRIGL SERPL-MCNC: 68 MG/DL (ref 30–150)
TSH SERPL DL<=0.005 MIU/L-ACNC: 1.64 UIU/ML (ref 0.4–4)

## 2024-06-19 PROCEDURE — 1125F AMNT PAIN NOTED PAIN PRSNT: CPT | Mod: CPTII,S$GLB,, | Performed by: FAMILY MEDICINE

## 2024-06-19 PROCEDURE — 3079F DIAST BP 80-89 MM HG: CPT | Mod: CPTII,S$GLB,, | Performed by: FAMILY MEDICINE

## 2024-06-19 PROCEDURE — 99214 OFFICE O/P EST MOD 30 MIN: CPT | Mod: S$GLB,,, | Performed by: FAMILY MEDICINE

## 2024-06-19 PROCEDURE — 3288F FALL RISK ASSESSMENT DOCD: CPT | Mod: CPTII,S$GLB,, | Performed by: FAMILY MEDICINE

## 2024-06-19 PROCEDURE — 1101F PT FALLS ASSESS-DOCD LE1/YR: CPT | Mod: CPTII,S$GLB,, | Performed by: FAMILY MEDICINE

## 2024-06-19 PROCEDURE — 3044F HG A1C LEVEL LT 7.0%: CPT | Mod: CPTII,S$GLB,, | Performed by: FAMILY MEDICINE

## 2024-06-19 PROCEDURE — 3008F BODY MASS INDEX DOCD: CPT | Mod: CPTII,S$GLB,, | Performed by: FAMILY MEDICINE

## 2024-06-19 PROCEDURE — 4010F ACE/ARB THERAPY RXD/TAKEN: CPT | Mod: CPTII,S$GLB,, | Performed by: FAMILY MEDICINE

## 2024-06-19 PROCEDURE — 80053 COMPREHEN METABOLIC PANEL: CPT | Performed by: FAMILY MEDICINE

## 2024-06-19 PROCEDURE — 36415 COLL VENOUS BLD VENIPUNCTURE: CPT | Performed by: FAMILY MEDICINE

## 2024-06-19 PROCEDURE — 84443 ASSAY THYROID STIM HORMONE: CPT | Performed by: FAMILY MEDICINE

## 2024-06-19 PROCEDURE — 99999 PR PBB SHADOW E&M-EST. PATIENT-LVL IV: CPT | Mod: PBBFAC,,, | Performed by: FAMILY MEDICINE

## 2024-06-19 PROCEDURE — 83036 HEMOGLOBIN GLYCOSYLATED A1C: CPT | Performed by: FAMILY MEDICINE

## 2024-06-19 PROCEDURE — 1159F MED LIST DOCD IN RCRD: CPT | Mod: CPTII,S$GLB,, | Performed by: FAMILY MEDICINE

## 2024-06-19 PROCEDURE — 3075F SYST BP GE 130 - 139MM HG: CPT | Mod: CPTII,S$GLB,, | Performed by: FAMILY MEDICINE

## 2024-06-19 PROCEDURE — 1160F RVW MEDS BY RX/DR IN RCRD: CPT | Mod: CPTII,S$GLB,, | Performed by: FAMILY MEDICINE

## 2024-06-19 PROCEDURE — 80061 LIPID PANEL: CPT | Performed by: FAMILY MEDICINE

## 2024-06-19 NOTE — PROGRESS NOTES
CHIEF COMPLAINT: Follow-up MRI w/hypertension and hyperlipidemia    HISTORY OF PRESENT ILLNESS: The patient is a 69 -year-old white male.  Fall 6/23 resulted in him ultimately getting an MRI 7/23.  Multiple bilateral renal cysts both simple and complex were seen.    SBO resolved with conservative Tx    The patient has chronic back pain.  He can no longer see his previous pain management physician due to insurance reasons.      His girlfriend remarks that he has been snoring for a long time but it is getting worse recently.    The patient has a history of stable hypertension on current medications.  Patient denies chest pain or shortness of breath today.    The patient has a history of stable hyperlipidemia on current medications.  The patient denies chest pain or shortness of breath today.  The patient denies muscle aches or myalgias suggestive of myositis.    REVIEW OF SYSTEMS:  GENERAL: No fever, chills, fatigability or weight loss.  SKIN: No rashes, itching or changes in color or texture of skin.  HEAD: No headaches or recent head trauma.  EYES: Visual acuity fine. No photophobia, ocular pain or diplopia.  EARS: Denies ear pain, discharge or vertigo.  NOSE: No loss of smell, no epistaxis or postnasal drip.  MOUTH & THROAT: No hoarseness or change in voice. No excessive gum bleeding.  NODES: Denies swollen glands.  CHEST: Denies RODARTE, cyanosis, wheezing, cough and sputum production.  CARDIOVASCULAR: Denies chest pain, PND, orthopnea or reduced exercise tolerance.  ABDOMEN: Appetite fine. No weight loss. Denies diarrhea, abdominal pain, hematemesis or blood in stool.  URINARY: No flank pain, dysuria or hematuria.  PERIPHERAL VASCULAR: No claudication or cyanosis.  MUSCULOSKELETAL: No joint stiffness or swelling.   NEUROLOGIC: No history of seizures, paralysis, alteration of gait or coordination.    SOCIAL HISTORY: The patient does not smoke.  The patient consumes alcohol socially.      PHYSICAL EXAMINATION:   Blood  "pressure 138/82, pulse 72, height 5' 9" (1.753 m), weight 91.7 kg (202 lb 2.6 oz), SpO2 96%.    APPEARANCE: Well nourished, well developed, in no acute distress.    HEAD: Normocephalic, atraumatic.  EYES: PERRL. EOMI.  Conjunctivae without injection and  anicteric  NOSE: Mucosa pink. Airway clear.  MOUTH & THROAT: No tonsillar enlargement. No pharyngeal erythema or exudate. No stridor.  NECK: Supple.   NODES: No cervical, axillary or inguinal lymph node enlargement.  CHEST: Lungs clear to auscultation.  No retractions are noted.  No rales or rhonchi are present.  CARDIOVASCULAR: Normal S1, S2. No rubs, murmurs or gallops.  ABDOMEN: Bowel sounds normal. Not distended. Soft. No tenderness or masses.  No ascites is noted.  MUSCULOSKELETAL:  There is no clubbing, cyanosis, or edema of the extremities x4.  There is full range of motion of the lumbar spine.  There is full range of motion of the extremities x4.  There is no deformity noted.    NEUROLOGIC:       Normal speech development.      Hearing normal.      Normal gait.      Motor and sensory exams grossly normal.  PSYCHIATRIC: Patient is alert and oriented x3.  Thought processes are all normal.  There is no homicidality.  There is no suicidality.  There is no evidence of psychosis.    LABORATORY/RADIOLOGY:   Chart reviewed.      ASSESSMENT:   SBO, resolved  Snoring likely KIRA  Chronic low back pain  Hypertension  Hyperlipidemia   Insomnia      PLAN:  We will follow-up blood work which we expect to be normal.    Ophthalmology referral  Sleep clinic referral  Trazodone 100 qhs  Return to clinic next year.          "

## 2024-07-03 ENCOUNTER — OFFICE VISIT (OUTPATIENT)
Dept: OTOLARYNGOLOGY | Facility: CLINIC | Age: 69
End: 2024-07-03
Payer: MEDICARE

## 2024-07-03 ENCOUNTER — CLINICAL SUPPORT (OUTPATIENT)
Dept: OTOLARYNGOLOGY | Facility: CLINIC | Age: 69
End: 2024-07-03
Payer: MEDICARE

## 2024-07-03 VITALS — SYSTOLIC BLOOD PRESSURE: 127 MMHG | DIASTOLIC BLOOD PRESSURE: 79 MMHG | HEART RATE: 84 BPM

## 2024-07-03 DIAGNOSIS — H90.3 SENSORINEURAL HEARING LOSS (SNHL) OF BOTH EARS: Primary | ICD-10-CM

## 2024-07-03 PROCEDURE — 92557 COMPREHENSIVE HEARING TEST: CPT | Mod: S$GLB,,, | Performed by: PHYSICIAN ASSISTANT

## 2024-07-03 PROCEDURE — 92567 TYMPANOMETRY: CPT | Mod: S$GLB,,, | Performed by: PHYSICIAN ASSISTANT

## 2024-07-03 PROCEDURE — 99999 PR PBB SHADOW E&M-EST. PATIENT-LVL III: CPT | Mod: PBBFAC,,, | Performed by: NURSE PRACTITIONER

## 2024-07-03 NOTE — PROGRESS NOTES
Tee Mtz, a 69 y.o. male, was seen today in the clinic for an audiologic evaluation.  Patients main complaint was hearing loss that has been progressing over time. He denies tinnitus, ear pain or ear drainage.  He has a family history of hearing loss (sister). He has a history of significant loud noise exposure (loud music).     Audiogram results revealed a mild to profound precipitous sensorineural hearing loss bilaterally. Speech reception thresholds were noted at 20 dB in the right ear and 20 dB in the left ear.  Speech discrimination scores were 72% in the right ear and 80% in the left ear.  Tympanometry revealed Type A in the right ear and Type A in the left ear.     Recommendations:  Otologic evaluation  Annual audiogram  Hearing protection when in noise  Hearing aid consultation

## 2024-07-03 NOTE — PROGRESS NOTES
Chief Complaint   Patient presents with    Cerumen Impaction    Hearing Loss       HPI:  Tee Mtz is a very pleasant 69 y.o. male self-referred for evaluation of hearing loss.  He reports hearing loss that has been gradually progressing over the last 1 year. His partner has been telling him that he doesn't hear as well.   He believes that it might be due to wax. He has not noted any tinnitus in either ear. He has not had any recent issues with ear pain or ear drainage. He has a family history of hearing loss (sister). He has not had any previous otologic surgery. He has a history of significant loud noise exposure (loud music).       Past Medical History:   Diagnosis Date    Coronary artery disease     Emphysema of lung     Hyperlipidemia     Hypertension      Social History     Socioeconomic History    Marital status: Single   Tobacco Use    Smoking status: Former     Current packs/day: 0.00     Types: Cigarettes     Quit date: 1993     Years since quittin.5    Smokeless tobacco: Never   Substance and Sexual Activity    Alcohol use: Yes     Comment: 2-3/ week    Drug use: Yes     Types: Marijuana     Comment: every now and then    Sexual activity: Yes     Social Determinants of Health     Financial Resource Strain: Medium Risk (2024)    Overall Financial Resource Strain (CARDIA)     Difficulty of Paying Living Expenses: Somewhat hard   Food Insecurity: Food Insecurity Present (2024)    Hunger Vital Sign     Worried About Running Out of Food in the Last Year: Sometimes true     Ran Out of Food in the Last Year: Sometimes true   Physical Activity: Insufficiently Active (2024)    Exercise Vital Sign     Days of Exercise per Week: 3 days     Minutes of Exercise per Session: 20 min   Stress: No Stress Concern Present (2024)    [employer] [place] - Occupational Stress Questionnaire     Feeling of Stress : Not at all   Housing Stability: Unknown (2024)     Housing Stability Vital Sign     Unable to Pay for Housing in the Last Year: No      Past Surgical History:   Procedure Laterality Date    CARDIAC CATHETERIZATION  2004    x2    HIP SURGERY       Family History   Problem Relation Name Age of Onset    Heart attack Mother      Heart disease Mother      Hypertension Mother           Review of Systems  General: negative for chills, fever or weight loss  Psychological: negative for mood changes or depression  Ophthalmic: negative for blurry vision, photophobia or eye pain  ENT: see HPI  Respiratory: no cough, shortness of breath, or wheezing  Cardiovascular: no chest pain or dyspnea on exertion  Gastrointestinal: no abdominal pain, change in bowel habits, or black/ bloody stools  Musculoskeletal: negative for gait disturbance or muscular weakness  Neurological: no syncope or seizures; no ataxia  Dermatological: negative for puritis,  rash and jaundice  Hematologic/lymphatic: no easy bruising, no new lumps or bumps      Physical Exam:    Vitals:    07/03/24 1501   BP: 127/79   Pulse: 84       Constitutional: Well appearing / communicating without difficutly.  NAD.  Eyes: EOM I Bilaterally  Head/Face: Normocephalic.  Negative paranasal sinus pressure/tenderness.  Salivary glands WNL.  House Brackmann I Bilaterally.    Right Ear: Auricle normal appearance. External Auditory Canal within normal limits no lesions or masses,TM w/o masses/lesions/perforations. TM mobility noted.   Left Ear: Auricle normal appearance. External Auditory Canal within normal limits no lesions or masses,TM w/o masses/lesions/perforations. TM mobility noted.    Nose: No gross nasal septal deviation. Inferior Turbinates 3+ bilaterally. No septal perforation. No masses/lesions. External nasal skin appears normal without masses/lesions.  Oral Cavity: Gingiva/lips within normal limits.  Dentition/gingiva healthy appearing. Mucus membranes moist. Floor of mouth soft, no masses palpated. Oral Tongue  mobile. Hard Palate appears normal.    Oropharynx: Base of tongue appears normal. No masses/lesions noted. Tonsillar fossa/pharyngeal wall without lesions. Posterior oropharynx WNL.  Soft palate without masses. Midline uvula.   Neck/Lymphatic: No LAD I-VI bilaterally.  No thyromegaly.  No masses noted on exam.      Diagnostic studies:  Audiogram interpreted personally by me and discussed in detail with the patient today.   Audiogram results revealed a mild to profound precipitous sensorineural hearing loss bilaterally. Speech reception thresholds were noted at 20 dB in the right ear and 20 dB in the left ear.  Speech discrimination scores were 72% in the right ear and 80% in the left ear.  Tympanometry revealed Type A in the right ear and Type A in the left ear.         Assessment:    ICD-10-CM ICD-9-CM    1. Sensorineural hearing loss (SNHL) of both ears  H90.3 389.18         The encounter diagnosis was Sensorineural hearing loss (SNHL) of both ears.      Plan:  No orders of the defined types were placed in this encounter.    -otoscopic exam benign with no cerumen impaction     We reviewed the patient's recent audiogram and hearing loss in detail.  We also discussed that he is a good candidate for hearing aids, if and when he the patient is motivated.    We also discussed the use hearing protection when exposed to loud noise, including lawn equipment. Recommend audiogram in 1 year.       Thank you kindly for allowing me to participate in the patient's care.       Virgie Lo NP

## 2024-09-20 ENCOUNTER — OFFICE VISIT (OUTPATIENT)
Dept: INTERNAL MEDICINE | Facility: CLINIC | Age: 69
End: 2024-09-20
Payer: MEDICARE

## 2024-09-20 VITALS
OXYGEN SATURATION: 97 % | DIASTOLIC BLOOD PRESSURE: 60 MMHG | BODY MASS INDEX: 29.29 KG/M2 | HEIGHT: 70 IN | WEIGHT: 204.56 LBS | SYSTOLIC BLOOD PRESSURE: 128 MMHG | HEART RATE: 46 BPM

## 2024-09-20 DIAGNOSIS — R00.1 ASYMPTOMATIC BRADYCARDIA: Primary | ICD-10-CM

## 2024-09-20 DIAGNOSIS — I10 ESSENTIAL HYPERTENSION: ICD-10-CM

## 2024-09-20 PROCEDURE — 99999 PR PBB SHADOW E&M-EST. PATIENT-LVL IV: CPT | Mod: PBBFAC,,,

## 2024-09-20 RX ORDER — METOPROLOL TARTRATE 50 MG/1
25 TABLET ORAL 2 TIMES DAILY
Qty: 180 TABLET | Refills: 3 | Status: SHIPPED | OUTPATIENT
Start: 2024-09-20

## 2024-09-20 RX ORDER — DOXYCYCLINE 100 MG/1
100 CAPSULE ORAL 2 TIMES DAILY
COMMUNITY
Start: 2024-09-20

## 2024-09-20 RX ORDER — FLUTICASONE PROPIONATE 50 MCG
SPRAY, SUSPENSION (ML) NASAL
COMMUNITY
Start: 2024-09-20

## 2024-09-20 NOTE — PROGRESS NOTES
INTERNAL MEDICINE  [facility]    Reason for visit:   Chief Complaint   Patient presents with    Irregular Heart Beat     Was told at urgent care today after EKG that he had an irregular heartbeat.     HPI: Tee Mtz is a 69 y.o. male presenting today for irregular heartbeat.    Patient is an established patient of PCP, Dr. Jj Montgomery MD. This patient is new to me.    History of Present Illness      RESPIRATORY:  He reports experiencing a cough for approximately one month, since the 19th of the previous month. He describes a feeling of cold in his chest with occasional productive cough and postnasal drip contributing to chest congestion. He also mentions fluid in his ears but denies ear infection. He experiences congestion but denies shortness of breath, even on exertion such as walking or climbing stairs. He denies chest pain or palpitations. He has been increasing his fluid intake over the past 4-5 weeks. His respiratory concerns have been addressed earlier today by Urgent Care.    CARDIOVASCULAR:  He reports a low heart rate of 46 detected at urgent care earlier today. EKG was performed by . He denies experiencing shortness of breath, chest pain, palpitations, or leg swelling. He has been taking metoprolol 50 mg twice daily for approximately 12 years for blood pressure control. He has not seen a cardiologist in a few years but expresses interest in re-establishing care with Dr. Landrum on [facility].    MEDICATIONS:  He is currently taking amlodipine and lisinopril for blood pressure management. He has been on metoprolol for approximately 20 years, since he was 49 years old. He confirms taking metoprolol on the day of the appointment.    GENITOURINARY:  He reports increased urinary frequency for the past 4-5 weeks, coinciding with a significant increase in fluid intake during this time period. He notes waking up at consistent times due to urination.    SLEEP:  He  reports experiencing sleep issues for the past 4-5 weeks. He consistently wakes up early, between 3:30-4:00 AM, and is unable to fall back asleep, resulting in him being awake for the rest of the day.    DIET:  He reports trying to maintain a healthy diet. He cooks for himself and avoids consuming excessive amounts of fried foods.    SOCIAL HISTORY:  He is employed as a [employer]. He reports limited computer skills and expresses difficulty with technology.    PAST MEDICAL HISTORY:  He has a history of hypertension.      ROS:  General: -fever, -chills, -fatigue, -weight gain, -weight loss  Eyes: -vision changes, -redness, -discharge  ENT: -ear pain, -nasal congestion, -sore throat  Cardiovascular: -chest pain, -palpitations, -lower extremity edema  Respiratory: +cough, -shortness of breath, +chest congestion  Gastrointestinal: -abdominal pain, -nausea, -vomiting, -diarrhea, -constipation, -blood in stool  Genitourinary: -dysuria, -hematuria, +frequency  Musculoskeletal: -joint pain, -muscle pain  Skin: -rash, -lesion  Neurological: -headache, -dizziness, -numbness, -tingling  Psychiatric: -anxiety, -depression, -sleep difficulty            Social History     Social History Narrative    Not on file       ALLERGIES:   Review of patient's allergies indicates:   Allergen Reactions    Meperidine Other (See Comments)     Not specified.       MEDS:   Current Outpatient Medications on File Prior to Visit   Medication Sig Dispense Refill Last Dose    amLODIPine (NORVASC) 10 MG tablet Take 1 tablet (10 mg total) by mouth once daily. 90 tablet 3 Taking    atorvastatin (LIPITOR) 80 MG tablet Take 1 tablet (80 mg total) by mouth nightly. FOR cholesterol 90 tablet 3 Taking    doxycycline (MONODOX) 100 MG capsule Take 100 mg by mouth 2 (two) times daily.       fluticasone propionate (FLONASE) 50 mcg/actuation nasal spray by Each Nostril route.       lisinopriL (PRINIVIL,ZESTRIL) 40 MG tablet Take 1 tablet (40 mg total) by mouth  "once daily. 90 tablet 3 Taking    naproxen (NAPROSYN) 500 MG tablet Take 1 tablet (500 mg total) by mouth 2 (two) times daily with meals. 20 tablet 0 Taking    omeprazole (PRILOSEC) 40 MG capsule Take 1 capsule (40 mg total) by mouth once daily. 90 capsule 3 Taking    oxyCODONE-acetaminophen (PERCOCET)  mg per tablet Take 1 tablet by mouth every 8 (eight) hours as needed for Pain.   Taking    [DISCONTINUED] metoprolol tartrate (LOPRESSOR) 50 MG tablet Take 1 tablet (50 mg total) by mouth 2 (two) times daily. 180 tablet 3 Taking    cyproheptadine (PERIACTIN) 4 mg tablet Take 1 tablet (4 mg total) by mouth 3 (three) times daily. (Patient not taking: Reported on 9/20/2024) 90 tablet 0 Not Taking    diclofenac sodium (VOLTAREN) 1 % Gel Apply 2 g topically 2 (two) times daily. for 10 days (Patient not taking: Reported on 6/19/2024) 50 g 0 Not Taking    hydroCHLOROthiazide (MICROZIDE) 12.5 mg capsule Take 1 capsule (12.5 mg total) by mouth once daily. (Patient not taking: Reported on 6/19/2024) 90 capsule 3 Not Taking    LIDOcaine (LIDODERM) 5 % Place 1 patch onto the skin once daily. Remove & Discard patch within 12 hours or as directed by MD (Patient not taking: Reported on 9/20/2024) 15 patch 0 Not Taking    polyethylene glycol (GLYCOLAX) 17 gram/dose powder Take 17 g by mouth once daily. (Patient not taking: Reported on 9/20/2024) 116 g 0 Not Taking       Vital signs:   Vitals:    09/20/24 1611   BP: 128/60   BP Location: Right arm   Patient Position: Sitting   BP Method: Medium (Manual)   Pulse: (!) 46   SpO2: 97%   Weight: 92.8 kg (204 lb 9.4 oz)   Height: 5' 10" (1.778 m)     Body mass index is 29.36 kg/m².    PHYSICAL EXAM:     Physical Exam      PERTINENT RESULTS:   No visits with results within 1 Week(s) from this visit.   Latest known visit with results is:   Lab Visit on 06/19/2024   Component Date Value Ref Range Status    Sodium 06/19/2024 140  136 - 145 mmol/L Final    Potassium 06/19/2024 4.3  3.5 - " 5.1 mmol/L Final    Chloride 06/19/2024 109  95 - 110 mmol/L Final    CO2 06/19/2024 20 (L)  23 - 29 mmol/L Final    Glucose 06/19/2024 89  70 - 110 mg/dL Final    BUN 06/19/2024 17  8 - 23 mg/dL Final    Creatinine 06/19/2024 1.1  0.5 - 1.4 mg/dL Final    Calcium 06/19/2024 9.3  8.7 - 10.5 mg/dL Final    Total Protein 06/19/2024 7.3  6.0 - 8.4 g/dL Final    Albumin 06/19/2024 3.9  3.5 - 5.2 g/dL Final    Total Bilirubin 06/19/2024 0.8  0.1 - 1.0 mg/dL Final    Comment: For infants and newborns, interpretation of results should be based  on gestational age, weight and in agreement with clinical  observations.    Premature Infant recommended reference ranges:  Up to 24 hours.............<8.0 mg/dL  Up to 48 hours............<12.0 mg/dL  3-5 days..................<15.0 mg/dL  6-29 days.................<15.0 mg/dL      Alkaline Phosphatase 06/19/2024 56  55 - 135 U/L Final    AST 06/19/2024 24  10 - 40 U/L Final    ALT 06/19/2024 24  10 - 44 U/L Final    eGFR 06/19/2024 >60  >60 mL/min/1.73 m^2 Final    Anion Gap 06/19/2024 11  8 - 16 mmol/L Final    Cholesterol 06/19/2024 118 (L)  120 - 199 mg/dL Final    Comment: The National Cholesterol Education Program (NCEP) has set the  following guidelines (reference ranges) for Cholesterol:  Optimal.....................<200 mg/dL  Borderline High.............200-239 mg/dL  High........................> or = 240 mg/dL      Triglycerides 06/19/2024 68  30 - 150 mg/dL Final    Comment: The National Cholesterol Education Program (NCEP) has set the  following guidelines (reference values) for triglycerides:  Normal......................<150 mg/dL  Borderline High.............150-199 mg/dL  High........................200-499 mg/dL      HDL 06/19/2024 37 (L)  40 - 75 mg/dL Final    Comment: The National Cholesterol Education Program (NCEP) has set the  following guidelines (reference values) for HDL Cholesterol:  Low...............<40 mg/dL  Optimal...........>60 mg/dL      LDL  Cholesterol 06/19/2024 67.4  63.0 - 159.0 mg/dL Final    Comment: The National Cholesterol Education Program (NCEP) has set the  following guidelines (reference values) for LDL Cholesterol:  Optimal.......................<130 mg/dL  Borderline High...............130-159 mg/dL  High..........................160-189 mg/dL  Very High.....................>190 mg/dL      HDL/Cholesterol Ratio 06/19/2024 31.4  20.0 - 50.0 % Final    Total Cholesterol/HDL Ratio 06/19/2024 3.2  2.0 - 5.0 Final    Non-HDL Cholesterol 06/19/2024 81  mg/dL Final    Comment: Risk category and Non-HDL cholesterol goals:  Coronary heart disease (CHD)or equivalent (10-year risk of CHD >20%):  Non-HDL cholesterol goal     <130 mg/dL  Two or more CHD risk factors and 10-year risk of CHD <= 20%:  Non-HDL cholesterol goal     <160 mg/dL  0 to 1 CHD risk factor:  Non-HDL cholesterol goal     <190 mg/dL      TSH 06/19/2024 1.638  0.400 - 4.000 uIU/mL Final    Hemoglobin A1C 06/19/2024 5.8 (H)  4.0 - 5.6 % Final    Comment: ADA Screening Guidelines:  5.7-6.4%  Consistent with prediabetes  >or=6.5%  Consistent with diabetes    High levels of fetal hemoglobin interfere with the HbA1C  assay. Heterozygous hemoglobin variants (HbS, HgC, etc)do  not significantly interfere with this assay.   However, presence of multiple variants may affect accuracy.      Estimated Avg Glucose 06/19/2024 120  68 - 131 mg/dL Final     Assessment & Plan    Evaluated patient's low heart rate (46 bpm) in context of long-term metoprolol use  Considered medication adjustment vs. further cardiac workup given absence of concerning symptoms  Will reduce metoprolol dose from 50mg BID to 25mg BID as initial intervention  Recommend cardiology evaluation and echocardiogram for comprehensive assessment  Advised continuation of urgent care treatment plan for persistent cough and congestion    BRADYCARDIA:  - Discussed relationship between beta-blockers (metoprolol) and low heart rate.  -  Tee to monitor pulse rate daily for 60 seconds using two fingers on wrist.  - Decreased metoprolol from 50mg twice daily to 25mg twice daily.  - Ordered echocardiogram and EKG.  - Referred to Dr. Joan Landrum in cardiology.  - Follow up via phone call on Monday to discuss heart rate response to medication change.  - Explained echocardiogram procedure as an echocardiogram.    UPPER RESPIRATORY INFECTION:  - Tee to continue OTC treatments for cough and congestion as prescribed by urgent care.    HYPERTENSION:  - Continued amlodipine, lisinopril    FOLLOW UP:  - Contact the office if chest pain or shortness of breath develops.  - Follow up for echocardiogram, EKG, and cardiology appointment with Dr. Landrum, aiming for around the 10th of the month.       ASSESSMENT/PLAN:    1. Asymptomatic bradycardia  -     Ambulatory referral/consult to Cardiology; Future; Expected date: 09/27/2024  -     Echo; Future  -     SCHEDULED EKG 12-LEAD (to Muse); Future  -     CBC Auto Differential; Future; Expected date: 09/20/2024  -     COMPREHENSIVE METABOLIC PANEL; Future; Expected date: 09/20/2024    2. Essential hypertension  -     metoprolol tartrate (LOPRESSOR) 50 MG tablet; Take 0.5 tablets (25 mg total) by mouth 2 (two) times daily.  Dispense: 180 tablet; Refill: 3      Vaccines recommended: Pneumococcal, shingles, RSV, tetanus, influenza, covid-19    Follow up if symptoms worsen or fail to improve.   Will follow-up via phone call on Monday 9/23.    JUDY Morfin   Internal Medicine

## 2024-09-23 ENCOUNTER — TELEPHONE (OUTPATIENT)
Dept: ADMINISTRATIVE | Facility: OTHER | Age: 69
End: 2024-09-23
Payer: MEDICARE

## 2024-09-23 NOTE — TELEPHONE ENCOUNTER
JESÚS with scheduled Cardiology appointment of 10-7-2024, and contact number provided for any questions. My Chart message to be sent.

## 2024-09-24 ENCOUNTER — HOSPITAL ENCOUNTER (EMERGENCY)
Facility: OTHER | Age: 69
Discharge: HOME | End: 2024-09-25
Attending: EMERGENCY MEDICINE
Payer: MEDICARE

## 2024-09-24 ENCOUNTER — TELEPHONE (OUTPATIENT)
Dept: INTERNAL MEDICINE | Facility: CLINIC | Age: 69
End: 2024-09-24
Payer: MEDICARE

## 2024-09-24 DIAGNOSIS — R05.9 COUGH: ICD-10-CM

## 2024-09-24 DIAGNOSIS — I10 HTN (HYPERTENSION): Primary | ICD-10-CM

## 2024-09-24 LAB
ALBUMIN SERPL BCP-MCNC: 3.8 G/DL (ref 3.5–5.2)
ALP SERPL-CCNC: 48 U/L (ref 55–135)
ALT SERPL W/O P-5'-P-CCNC: 37 U/L (ref 10–44)
ANION GAP SERPL CALC-SCNC: 10 MMOL/L (ref 8–16)
AST SERPL-CCNC: 37 U/L (ref 10–40)
BASOPHILS # BLD AUTO: 0.06 K/UL (ref 0–0.2)
BASOPHILS NFR BLD: 0.8 % (ref 0–1.9)
BILIRUB SERPL-MCNC: 0.6 MG/DL (ref 0.1–1)
BNP SERPL-MCNC: 112 PG/ML (ref 0–99)
BUN SERPL-MCNC: 13 MG/DL (ref 8–23)
CALCIUM SERPL-MCNC: 8.9 MG/DL (ref 8.7–10.5)
CHLORIDE SERPL-SCNC: 110 MMOL/L (ref 95–110)
CO2 SERPL-SCNC: 19 MMOL/L (ref 23–29)
CREAT SERPL-MCNC: 1.1 MG/DL (ref 0.5–1.4)
DIFFERENTIAL METHOD BLD: ABNORMAL
EOSINOPHIL # BLD AUTO: 0.1 K/UL (ref 0–0.5)
EOSINOPHIL NFR BLD: 0.9 % (ref 0–8)
ERYTHROCYTE [DISTWIDTH] IN BLOOD BY AUTOMATED COUNT: 17.6 % (ref 11.5–14.5)
EST. GFR  (NO RACE VARIABLE): >60 ML/MIN/1.73 M^2
GLUCOSE SERPL-MCNC: 94 MG/DL (ref 70–110)
HCT VFR BLD AUTO: 44.1 % (ref 40–54)
HGB BLD-MCNC: 13.8 G/DL (ref 14–18)
IMM GRANULOCYTES # BLD AUTO: 0.02 K/UL (ref 0–0.04)
IMM GRANULOCYTES NFR BLD AUTO: 0.3 % (ref 0–0.5)
LIPASE SERPL-CCNC: 85 U/L (ref 4–60)
LYMPHOCYTES # BLD AUTO: 3.2 K/UL (ref 1–4.8)
LYMPHOCYTES NFR BLD: 41.2 % (ref 18–48)
MAGNESIUM SERPL-MCNC: 1.7 MG/DL (ref 1.6–2.6)
MCH RBC QN AUTO: 22.7 PG (ref 27–31)
MCHC RBC AUTO-ENTMCNC: 31.3 G/DL (ref 32–36)
MCV RBC AUTO: 73 FL (ref 82–98)
MONOCYTES # BLD AUTO: 1 K/UL (ref 0.3–1)
MONOCYTES NFR BLD: 12.3 % (ref 4–15)
NEUTROPHILS # BLD AUTO: 3.5 K/UL (ref 1.8–7.7)
NEUTROPHILS NFR BLD: 44.5 % (ref 38–73)
NRBC BLD-RTO: 0 /100 WBC
PLATELET # BLD AUTO: 170 K/UL (ref 150–450)
PMV BLD AUTO: ABNORMAL FL (ref 9.2–12.9)
POTASSIUM SERPL-SCNC: 3.8 MMOL/L (ref 3.5–5.1)
PROT SERPL-MCNC: 7.3 G/DL (ref 6–8.4)
RBC # BLD AUTO: 6.08 M/UL (ref 4.6–6.2)
SODIUM SERPL-SCNC: 139 MMOL/L (ref 136–145)
TROPONIN I SERPL DL<=0.01 NG/ML-MCNC: 0.01 NG/ML (ref 0–0.03)
WBC # BLD AUTO: 7.79 K/UL (ref 3.9–12.7)

## 2024-09-24 PROCEDURE — 63600175 PHARM REV CODE 636 W HCPCS: Performed by: EMERGENCY MEDICINE

## 2024-09-24 PROCEDURE — 83880 ASSAY OF NATRIURETIC PEPTIDE: CPT | Performed by: EMERGENCY MEDICINE

## 2024-09-24 PROCEDURE — 96374 THER/PROPH/DIAG INJ IV PUSH: CPT

## 2024-09-24 PROCEDURE — 80053 COMPREHEN METABOLIC PANEL: CPT | Performed by: EMERGENCY MEDICINE

## 2024-09-24 PROCEDURE — 83690 ASSAY OF LIPASE: CPT | Performed by: EMERGENCY MEDICINE

## 2024-09-24 PROCEDURE — 99285 EMERGENCY DEPT VISIT HI MDM: CPT | Mod: 25

## 2024-09-24 PROCEDURE — 85025 COMPLETE CBC W/AUTO DIFF WBC: CPT | Performed by: EMERGENCY MEDICINE

## 2024-09-24 PROCEDURE — 93005 ELECTROCARDIOGRAM TRACING: CPT

## 2024-09-24 PROCEDURE — 93010 ELECTROCARDIOGRAM REPORT: CPT | Mod: ,,, | Performed by: INTERNAL MEDICINE

## 2024-09-24 PROCEDURE — 83735 ASSAY OF MAGNESIUM: CPT | Performed by: EMERGENCY MEDICINE

## 2024-09-24 PROCEDURE — 84484 ASSAY OF TROPONIN QUANT: CPT | Performed by: EMERGENCY MEDICINE

## 2024-09-24 RX ORDER — ACETAMINOPHEN 500 MG
1000 TABLET ORAL
Status: DISCONTINUED | OUTPATIENT
Start: 2024-09-24 | End: 2024-09-25 | Stop reason: HOSPADM

## 2024-09-24 RX ORDER — FUROSEMIDE 10 MG/ML
20 INJECTION INTRAMUSCULAR; INTRAVENOUS
Status: COMPLETED | OUTPATIENT
Start: 2024-09-24 | End: 2024-09-24

## 2024-09-24 RX ORDER — IPRATROPIUM BROMIDE AND ALBUTEROL SULFATE 2.5; .5 MG/3ML; MG/3ML
3 SOLUTION RESPIRATORY (INHALATION)
Status: COMPLETED | OUTPATIENT
Start: 2024-09-25 | End: 2024-09-25

## 2024-09-24 RX ADMIN — FUROSEMIDE 20 MG: 10 INJECTION, SOLUTION INTRAMUSCULAR; INTRAVENOUS at 11:09

## 2024-09-25 ENCOUNTER — TELEPHONE (OUTPATIENT)
Dept: INTERNAL MEDICINE | Facility: CLINIC | Age: 69
End: 2024-09-25
Payer: MEDICARE

## 2024-09-25 VITALS
RESPIRATION RATE: 18 BRPM | OXYGEN SATURATION: 97 % | BODY MASS INDEX: 29.2 KG/M2 | WEIGHT: 204 LBS | DIASTOLIC BLOOD PRESSURE: 88 MMHG | SYSTOLIC BLOOD PRESSURE: 155 MMHG | TEMPERATURE: 98 F | HEIGHT: 70 IN | HEART RATE: 62 BPM

## 2024-09-25 LAB
OHS QRS DURATION: 132 MS
OHS QTC CALCULATION: 440 MS

## 2024-09-25 PROCEDURE — 94640 AIRWAY INHALATION TREATMENT: CPT

## 2024-09-25 PROCEDURE — 25000242 PHARM REV CODE 250 ALT 637 W/ HCPCS: Performed by: EMERGENCY MEDICINE

## 2024-09-25 RX ORDER — ALBUTEROL SULFATE 90 UG/1
1-2 INHALANT RESPIRATORY (INHALATION) EVERY 6 HOURS PRN
Qty: 18 G | Refills: 0 | Status: SHIPPED | OUTPATIENT
Start: 2024-09-25

## 2024-09-25 RX ADMIN — IPRATROPIUM BROMIDE AND ALBUTEROL SULFATE 3 ML: .5; 3 SOLUTION RESPIRATORY (INHALATION) at 12:09

## 2024-09-25 NOTE — DISCHARGE INSTRUCTIONS
Please return to the ER if you have chest pain, difficulty breathing, fevers, altered mental status, dizziness, weakness, or any other concerns.  .    Follow up with your primary care physician.
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
## 2024-09-25 NOTE — ED TRIAGE NOTES
Tee Mtz, a 69 y.o. male presents to the ED via private car with complaints of having high blood pressure times 6 and a headache at home today. Denies chest pain and SOB but does report congestion last week. Patient reports taking steroids for respiratory symptoms. Pt resting comfortably. Connected to cardiac monitor, BP cuff, and pulse oximeter. ED workup in progress. Call light within reach. Safety measures in place. Denies further needs. Plan of care ongoing.      Chief Complaint   Patient presents with    Headache     Headache today, says B/P is high, took it 6 x's today     Review of patient's allergies indicates:   Allergen Reactions    Meperidine Other (See Comments)     Not specified.     Past Medical History:   Diagnosis Date    Coronary artery disease     Emphysema of lung     Hyperlipidemia     Hypertension      Past Surgical History:   Procedure Laterality Date    CARDIAC CATHETERIZATION  2004    x2    HIP SURGERY

## 2024-09-30 ENCOUNTER — TELEPHONE (OUTPATIENT)
Dept: ADMINISTRATIVE | Facility: OTHER | Age: 69
End: 2024-09-30
Payer: MEDICARE

## 2024-09-30 NOTE — TELEPHONE ENCOUNTER
JESÚS with scheduled Cardiology appointment of 10-, and contact number provided for any questions.

## 2024-10-07 ENCOUNTER — OFFICE VISIT (OUTPATIENT)
Dept: CARDIOLOGY | Facility: CLINIC | Age: 69
End: 2024-10-07
Payer: MEDICARE

## 2024-10-07 VITALS
DIASTOLIC BLOOD PRESSURE: 71 MMHG | OXYGEN SATURATION: 97 % | WEIGHT: 209 LBS | BODY MASS INDEX: 29.92 KG/M2 | HEART RATE: 45 BPM | SYSTOLIC BLOOD PRESSURE: 139 MMHG | HEIGHT: 70 IN

## 2024-10-07 DIAGNOSIS — J43.1 PANLOBULAR EMPHYSEMA: ICD-10-CM

## 2024-10-07 DIAGNOSIS — I45.2 RBBB (RIGHT BUNDLE BRANCH BLOCK WITH LEFT ANTERIOR FASCICULAR BLOCK): ICD-10-CM

## 2024-10-07 DIAGNOSIS — I25.10 CAD S/P PERCUTANEOUS CORONARY ANGIOPLASTY: ICD-10-CM

## 2024-10-07 DIAGNOSIS — I10 ESSENTIAL HYPERTENSION: ICD-10-CM

## 2024-10-07 DIAGNOSIS — I70.0 AORTIC ATHEROSCLEROSIS: ICD-10-CM

## 2024-10-07 DIAGNOSIS — E78.5 HYPERLIPIDEMIA WITH TARGET LOW DENSITY LIPOPROTEIN (LDL) CHOLESTEROL LESS THAN 130 MG/DL: ICD-10-CM

## 2024-10-07 DIAGNOSIS — I10 PRIMARY HYPERTENSION: Primary | ICD-10-CM

## 2024-10-07 DIAGNOSIS — R00.1 ASYMPTOMATIC BRADYCARDIA: ICD-10-CM

## 2024-10-07 DIAGNOSIS — Z98.61 CAD S/P PERCUTANEOUS CORONARY ANGIOPLASTY: ICD-10-CM

## 2024-10-07 PROCEDURE — 1159F MED LIST DOCD IN RCRD: CPT | Mod: CPTII,S$GLB,, | Performed by: INTERNAL MEDICINE

## 2024-10-07 PROCEDURE — 4010F ACE/ARB THERAPY RXD/TAKEN: CPT | Mod: CPTII,S$GLB,, | Performed by: INTERNAL MEDICINE

## 2024-10-07 PROCEDURE — 3075F SYST BP GE 130 - 139MM HG: CPT | Mod: CPTII,S$GLB,, | Performed by: INTERNAL MEDICINE

## 2024-10-07 PROCEDURE — 3008F BODY MASS INDEX DOCD: CPT | Mod: CPTII,S$GLB,, | Performed by: INTERNAL MEDICINE

## 2024-10-07 PROCEDURE — 99204 OFFICE O/P NEW MOD 45 MIN: CPT | Mod: S$GLB,,, | Performed by: INTERNAL MEDICINE

## 2024-10-07 PROCEDURE — 99999 PR PBB SHADOW E&M-EST. PATIENT-LVL III: CPT | Mod: PBBFAC,,, | Performed by: INTERNAL MEDICINE

## 2024-10-07 PROCEDURE — 3078F DIAST BP <80 MM HG: CPT | Mod: CPTII,S$GLB,, | Performed by: INTERNAL MEDICINE

## 2024-10-07 PROCEDURE — 3044F HG A1C LEVEL LT 7.0%: CPT | Mod: CPTII,S$GLB,, | Performed by: INTERNAL MEDICINE

## 2024-10-07 PROCEDURE — 1160F RVW MEDS BY RX/DR IN RCRD: CPT | Mod: CPTII,S$GLB,, | Performed by: INTERNAL MEDICINE

## 2024-10-07 RX ORDER — HYDROCHLOROTHIAZIDE 12.5 MG/1
12.5 CAPSULE ORAL DAILY
Qty: 90 CAPSULE | Refills: 3 | Status: SHIPPED | OUTPATIENT
Start: 2024-10-07 | End: 2025-10-02

## 2024-10-07 RX ORDER — CARVEDILOL 3.12 MG/1
3.12 TABLET ORAL 2 TIMES DAILY WITH MEALS
Qty: 180 TABLET | Refills: 3 | Status: SHIPPED | OUTPATIENT
Start: 2024-10-07 | End: 2024-10-08 | Stop reason: SDUPTHER

## 2024-10-07 RX ORDER — CARVEDILOL 6.25 MG/1
6.25 TABLET ORAL 2 TIMES DAILY WITH MEALS
Qty: 180 TABLET | Refills: 3 | Status: SHIPPED | OUTPATIENT
Start: 2024-10-07 | End: 2024-10-07

## 2024-10-07 NOTE — PROGRESS NOTES
Subjective:   @Patient ID:  Tee Mtz is a 69 y.o. male who presents for evaluation of palpitations    HPI:   2024:  Initial evaluation.  The patient is unsure why he is here.  Recently had an ER visit for hypertension.  He denies any chest pain or significant shortness of breath.  He reports compliance with medications.  He has chronic sinus bradycardia and his Lopressor dose decreased from 50 to 25 mg b.i.d..  He is not taking hydrochlorothiazide for unclear reasons.  BP today still elevated.  He did not see Cardiology for long time.  He would like to get his cardiac care and all the care in [place] at  [facility].     History of PCI in  at [facility]. ? [place].   SH: Stopped smoking 30+ yrs, no etoh abuse  Prior cardiovascular  Hx  --------------------------------      - EKG 2024 sinus rhythm, RBBB, LAFB ( chronic)          Patient Active Problem List    Diagnosis Date Noted    Asymptomatic bradycardia 2024    Aortic atherosclerosis 2024    Enteritis 2023    SBO (small bowel obstruction) 2023    Leukocytosis 2023    CAD S/P percutaneous coronary angioplasty 2014 In [facility]- Dr. Curiel      RBBB (right bundle branch block with left anterior fascicular block) 2014    Hyperlipidemia LDL goal < 130 2013     Dx updated per 2019 IMO Load      HTN (hypertension) 2012    Emphysema/COPD 2012    Chronic pain due to trauma 2012           Right Arm BP - Sittin/94  Left Arm BP - Sittin/71        LAST HbA1c  Lab Results   Component Value Date    HGBA1C 5.8 (H) 2024       Lipid panel  Lab Results   Component Value Date    CHOL 118 (L) 2024    CHOL 129 2019    CHOL 118 (L) 2017     Lab Results   Component Value Date    HDL 37 (L) 2024    HDL 41 2019    HDL 33 (L) 2017     Lab Results   Component Value Date    LDLCALC 67.4 2024    LDLCALC 71.4 2019     LDLCALC 68.0 12/21/2017     Lab Results   Component Value Date    TRIG 68 06/19/2024    TRIG 83 12/16/2019    TRIG 85 12/21/2017     Lab Results   Component Value Date    CHOLHDL 31.4 06/19/2024    CHOLHDL 31.8 12/16/2019    CHOLHDL 28.0 12/21/2017            Review of Systems   Constitutional: Negative for chills and fever.   HENT:  Negative for hearing loss and nosebleeds.    Eyes:  Negative for blurred vision.   Cardiovascular:         As in HPI   Respiratory:  Negative for hemoptysis and shortness of breath.    Hematologic/Lymphatic: Negative for bleeding problem.   Skin:  Negative for itching.   Musculoskeletal:  Negative for falls.   Gastrointestinal:  Negative for abdominal pain and hematochezia.   Genitourinary:  Negative for hematuria.   Neurological:  Negative for dizziness and loss of balance.   Psychiatric/Behavioral:  Negative for altered mental status and depression.        Objective:   Physical Exam  Constitutional:       Appearance: He is well-developed.   HENT:      Head: Normocephalic and atraumatic.   Eyes:      Conjunctiva/sclera: Conjunctivae normal.   Neck:      Vascular: No carotid bruit or JVD.   Cardiovascular:      Rate and Rhythm: Regular rhythm. Bradycardia present.      Pulses:           Carotid pulses are 2+ on the right side and 2+ on the left side.       Radial pulses are 2+ on the right side and 2+ on the left side.      Heart sounds: Normal heart sounds. No murmur heard.     No friction rub. No gallop.   Pulmonary:      Effort: Pulmonary effort is normal. No respiratory distress.      Breath sounds: Normal breath sounds. No stridor. No wheezing.   Musculoskeletal:      Cervical back: Neck supple.   Skin:     General: Skin is warm and dry.   Neurological:      Mental Status: He is alert and oriented to person, place, and time.   Psychiatric:         Behavior: Behavior normal.         Assessment:     1. Primary hypertension    2. CAD S/P percutaneous coronary angioplasty    3.  Asymptomatic bradycardia    4. Aortic atherosclerosis    5. Hyperlipidemia LDL goal < 130    6. RBBB (right bundle branch block with left anterior fascicular block)    7. Panlobular emphysema    8. Essential hypertension        Plan:   Coronary artery disease.  Stable symptoms.  He denies any chest pain or angina equivalent.  Continue statin.  LDL at goal.  Recommend aspirin.    We will switch Lopressor to Coreg for better blood pressure control.  Up titrate as able to  Continue amlodipine and lisinopril  Refill hydrochlorothiazide 12.5 mg  Low-salt diet  Monitor blood pressure and keep log.  Goal BP less than 130/80  Mediterranean diet  Pending echo and EKG    Follow up in [place] per patient's request  Needs three-month follow up      Pertinent cardiac images and EKG reviewed independently.    Continue with current medical plan and lifestyle changes.  Return sooner for concerns or questions. If symptoms persist go to the ED  I have reviewed all pertinent data including patient's medical history in detail and updated the computerized patient record.     No orders of the defined types were placed in this encounter.      Follow up as scheduled.     He expressed verbal understanding and agreed with the plan    Patient's Medications   New Prescriptions    CARVEDILOL (COREG) 3.125 MG TABLET    Take 1 tablet (3.125 mg total) by mouth 2 (two) times daily with meals.   Previous Medications    ALBUTEROL (PROVENTIL/VENTOLIN HFA) 90 MCG/ACTUATION INHALER    Inhale 1-2 puffs into the lungs every 6 (six) hours as needed for Wheezing. Rescue    AMLODIPINE (NORVASC) 10 MG TABLET    Take 1 tablet (10 mg total) by mouth once daily.    ATORVASTATIN (LIPITOR) 80 MG TABLET    Take 1 tablet (80 mg total) by mouth nightly. FOR cholesterol    CYPROHEPTADINE (PERIACTIN) 4 MG TABLET    Take 1 tablet (4 mg total) by mouth 3 (three) times daily.    DICLOFENAC SODIUM (VOLTAREN) 1 % GEL    Apply 2 g topically 2 (two) times daily. for 10  days    DOXYCYCLINE (MONODOX) 100 MG CAPSULE    Take 100 mg by mouth 2 (two) times daily.    FLUTICASONE PROPIONATE (FLONASE) 50 MCG/ACTUATION NASAL SPRAY    by Each Nostril route.    LIDOCAINE (LIDODERM) 5 %    Place 1 patch onto the skin once daily. Remove & Discard patch within 12 hours or as directed by MD    LISINOPRIL (PRINIVIL,ZESTRIL) 40 MG TABLET    Take 1 tablet (40 mg total) by mouth once daily.    NAPROXEN (NAPROSYN) 500 MG TABLET    Take 1 tablet (500 mg total) by mouth 2 (two) times daily with meals.    OMEPRAZOLE (PRILOSEC) 40 MG CAPSULE    Take 1 capsule (40 mg total) by mouth once daily.    OXYCODONE-ACETAMINOPHEN (PERCOCET)  MG PER TABLET    Take 1 tablet by mouth every 8 (eight) hours as needed for Pain.    POLYETHYLENE GLYCOL (GLYCOLAX) 17 GRAM/DOSE POWDER    Take 17 g by mouth once daily.   Modified Medications    Modified Medication Previous Medication    HYDROCHLOROTHIAZIDE (MICROZIDE) 12.5 MG CAPSULE hydroCHLOROthiazide (MICROZIDE) 12.5 mg capsule       Take 1 capsule (12.5 mg total) by mouth once daily.    Take 1 capsule (12.5 mg total) by mouth once daily.   Discontinued Medications    METOPROLOL TARTRATE (LOPRESSOR) 50 MG TABLET    Take 0.5 tablets (25 mg total) by mouth 2 (two) times daily.

## 2024-10-08 ENCOUNTER — OFFICE VISIT (OUTPATIENT)
Dept: INTERNAL MEDICINE | Facility: CLINIC | Age: 69
End: 2024-10-08
Attending: FAMILY MEDICINE
Payer: MEDICARE

## 2024-10-08 ENCOUNTER — PATIENT OUTREACH (OUTPATIENT)
Dept: ADMINISTRATIVE | Facility: HOSPITAL | Age: 69
End: 2024-10-08
Payer: MEDICARE

## 2024-10-08 VITALS
BODY MASS INDEX: 29.7 KG/M2 | SYSTOLIC BLOOD PRESSURE: 112 MMHG | DIASTOLIC BLOOD PRESSURE: 68 MMHG | HEART RATE: 75 BPM | OXYGEN SATURATION: 95 % | HEIGHT: 70 IN | WEIGHT: 207.44 LBS

## 2024-10-08 DIAGNOSIS — Z98.61 CAD S/P PERCUTANEOUS CORONARY ANGIOPLASTY: ICD-10-CM

## 2024-10-08 DIAGNOSIS — I25.10 CAD S/P PERCUTANEOUS CORONARY ANGIOPLASTY: ICD-10-CM

## 2024-10-08 DIAGNOSIS — I10 PRIMARY HYPERTENSION: ICD-10-CM

## 2024-10-08 DIAGNOSIS — R00.1 ASYMPTOMATIC BRADYCARDIA: Primary | ICD-10-CM

## 2024-10-08 DIAGNOSIS — I10 ESSENTIAL HYPERTENSION: ICD-10-CM

## 2024-10-08 PROCEDURE — 99214 OFFICE O/P EST MOD 30 MIN: CPT | Mod: S$GLB,,, | Performed by: FAMILY MEDICINE

## 2024-10-08 PROCEDURE — 1159F MED LIST DOCD IN RCRD: CPT | Mod: CPTII,S$GLB,, | Performed by: FAMILY MEDICINE

## 2024-10-08 PROCEDURE — 4010F ACE/ARB THERAPY RXD/TAKEN: CPT | Mod: CPTII,S$GLB,, | Performed by: FAMILY MEDICINE

## 2024-10-08 PROCEDURE — 3044F HG A1C LEVEL LT 7.0%: CPT | Mod: CPTII,S$GLB,, | Performed by: FAMILY MEDICINE

## 2024-10-08 PROCEDURE — 1125F AMNT PAIN NOTED PAIN PRSNT: CPT | Mod: CPTII,S$GLB,, | Performed by: FAMILY MEDICINE

## 2024-10-08 PROCEDURE — 3078F DIAST BP <80 MM HG: CPT | Mod: CPTII,S$GLB,, | Performed by: FAMILY MEDICINE

## 2024-10-08 PROCEDURE — 3288F FALL RISK ASSESSMENT DOCD: CPT | Mod: CPTII,S$GLB,, | Performed by: FAMILY MEDICINE

## 2024-10-08 PROCEDURE — 99999 PR PBB SHADOW E&M-EST. PATIENT-LVL V: CPT | Mod: PBBFAC,,, | Performed by: FAMILY MEDICINE

## 2024-10-08 PROCEDURE — 3008F BODY MASS INDEX DOCD: CPT | Mod: CPTII,S$GLB,, | Performed by: FAMILY MEDICINE

## 2024-10-08 PROCEDURE — 1160F RVW MEDS BY RX/DR IN RCRD: CPT | Mod: CPTII,S$GLB,, | Performed by: FAMILY MEDICINE

## 2024-10-08 PROCEDURE — 1101F PT FALLS ASSESS-DOCD LE1/YR: CPT | Mod: CPTII,S$GLB,, | Performed by: FAMILY MEDICINE

## 2024-10-08 PROCEDURE — 3074F SYST BP LT 130 MM HG: CPT | Mod: CPTII,S$GLB,, | Performed by: FAMILY MEDICINE

## 2024-10-08 RX ORDER — CARVEDILOL 3.12 MG/1
3.12 TABLET ORAL 2 TIMES DAILY WITH MEALS
Qty: 180 TABLET | Refills: 3 | Status: SHIPPED | OUTPATIENT
Start: 2024-10-08 | End: 2025-10-08

## 2024-10-08 RX ORDER — RIMEGEPANT SULFATE 75 MG/75MG
TABLET, ORALLY DISINTEGRATING ORAL
COMMUNITY

## 2024-10-08 RX ORDER — MAGNESIUM OXIDE/MAG AA CHELATE 300 MG
CAPSULE ORAL
COMMUNITY

## 2024-10-08 RX ORDER — L-MEFOL/A-CYST/MEB12/ALGAL OIL 6-600-2 MG
TABLET ORAL
COMMUNITY

## 2024-10-08 RX ORDER — TIZANIDINE 4 MG/1
4 TABLET ORAL 2 TIMES DAILY
COMMUNITY
Start: 2024-09-16

## 2024-10-08 NOTE — PROGRESS NOTES
CHIEF COMPLAINT: Follow-up ER and cards w/hypertension and hyperlipidemia    HISTORY OF PRESENT ILLNESS: The patient is a 69 -year-old white male.  Recently seen in the emergency room with elevated blood pressure.  Also bradycardia.  He was referred to Cardiology.  Medication changes as below    Fall 6/23 resulted in him ultimately getting an MRI 7/23.  Multiple bilateral renal cysts both simple and complex were seen.    SBO resolved with conservative Tx    The patient has chronic back pain.  He can no longer see his previous pain management physician due to insurance reasons.      The patient has a history of stable hypertension on current medications.  Patient denies chest pain or shortness of breath today.    The patient has a history of stable hyperlipidemia on current medications.  The patient denies chest pain or shortness of breath today.  The patient denies muscle aches or myalgias suggestive of myositis.    REVIEW OF SYSTEMS:  GENERAL: No fever, chills, fatigability or weight loss.  SKIN: No rashes, itching or changes in color or texture of skin.  HEAD: No headaches or recent head trauma.  EYES: Visual acuity fine. No photophobia, ocular pain or diplopia.  EARS: Denies ear pain, discharge or vertigo.  NOSE: No loss of smell, no epistaxis or postnasal drip.  MOUTH & THROAT: No hoarseness or change in voice. No excessive gum bleeding.  NODES: Denies swollen glands.  CHEST: Denies RODARTE, cyanosis, wheezing, cough and sputum production.  CARDIOVASCULAR: Denies chest pain, PND, orthopnea or reduced exercise tolerance.  ABDOMEN: Appetite fine. No weight loss. Denies diarrhea, abdominal pain, hematemesis or blood in stool.  URINARY: No flank pain, dysuria or hematuria.  PERIPHERAL VASCULAR: No claudication or cyanosis.  MUSCULOSKELETAL: No joint stiffness or swelling.   NEUROLOGIC: No history of seizures, paralysis, alteration of gait or coordination.    SOCIAL HISTORY: The patient does not smoke.  The patient  "consumes alcohol socially.      PHYSICAL EXAMINATION:   Blood pressure 112/68, pulse 75, height 5' 10" (1.778 m), weight 94.1 kg (207 lb 7.3 oz), SpO2 95%.    APPEARANCE: Well nourished, well developed, in no acute distress.    HEAD: Normocephalic, atraumatic.  EYES: PERRL. EOMI.  Conjunctivae without injection and  anicteric  NOSE: Mucosa pink. Airway clear.  MOUTH & THROAT: No tonsillar enlargement. No pharyngeal erythema or exudate. No stridor.  NECK: Supple.   NODES: No cervical, axillary or inguinal lymph node enlargement.  CHEST: Lungs clear to auscultation.  No retractions are noted.  No rales or rhonchi are present.  CARDIOVASCULAR: Normal S1, S2. No rubs, murmurs or gallops.  ABDOMEN: Bowel sounds normal. Not distended. Soft. No tenderness or masses.  No ascites is noted.  MUSCULOSKELETAL:  There is no clubbing, cyanosis, or edema of the extremities x4.  There is full range of motion of the lumbar spine.  There is full range of motion of the extremities x4.  There is no deformity noted.    NEUROLOGIC:       Normal speech development.      Hearing normal.      Normal gait.      Motor and sensory exams grossly normal.  PSYCHIATRIC: Patient is alert and oriented x3.  Thought processes are all normal.  There is no homicidality.  There is no suicidality.  There is no evidence of psychosis.    LABORATORY/RADIOLOGY:   Chart reviewed.      ASSESSMENT:   Asymptomatic bradycardia due to beta-blocker  Hypertension    SBO, resolved  Snoring likely KIRA  Chronic low back pain  Hyperlipidemia   Insomnia      PLAN:  Extensive chart review reveals that the decision has been made to discontinue the metoprolol which is the most likely culprit causing the bradycardia.  Begin treatment with low-dose carvedilol which will up titrate on follow-up visits.  Ophthalmology referral  Sleep clinic referral  Trazodone 100 qhs  Return to clinic in 3 months same day as cardiology appointment  "

## 2024-10-10 ENCOUNTER — HOSPITAL ENCOUNTER (OUTPATIENT)
Dept: CARDIOLOGY | Facility: OTHER | Age: 69
Discharge: HOME | End: 2024-10-10
Payer: MEDICARE

## 2024-10-10 VITALS
BODY MASS INDEX: 29.63 KG/M2 | HEART RATE: 75 BPM | SYSTOLIC BLOOD PRESSURE: 112 MMHG | HEIGHT: 70 IN | DIASTOLIC BLOOD PRESSURE: 68 MMHG | WEIGHT: 207 LBS

## 2024-10-10 DIAGNOSIS — R00.1 ASYMPTOMATIC BRADYCARDIA: ICD-10-CM

## 2024-10-10 LAB
ASCENDING AORTA: 2.88 CM
AV INDEX (PROSTH): 0.65
AV MEAN GRADIENT: 5.1 MMHG
AV PEAK GRADIENT: 10.2 MMHG
AV VALVE AREA BY VELOCITY RATIO: 2 CM²
AV VALVE AREA: 2 CM²
AV VELOCITY RATIO: 0.63
BSA FOR ECHO PROCEDURE: 2.15 M2
CV ECHO LV RWT: 0.4 CM
DOP CALC AO PEAK VEL: 1.6 M/S
DOP CALC AO VTI: 34.1 CM
DOP CALC LVOT AREA: 3.1 CM2
DOP CALC LVOT DIAMETER: 2 CM
DOP CALC LVOT PEAK VEL: 1 M/S
DOP CALC LVOT STROKE VOLUME: 69.7 CM3
DOP CALC RVOT PEAK VEL: 0.6 M/S
DOP CALCLVOT PEAK VEL VTI: 22.2 CM
E WAVE DECELERATION TIME: 236.81 MSEC
E/A RATIO: 0.77
E/E' RATIO: 7.29 M/S
ECHO LV POSTERIOR WALL: 0.9 CM (ref 0.6–1.1)
EJECTION FRACTION: 60 %
FRACTIONAL SHORTENING: 33.3 % (ref 28–44)
GLOBAL LONGITUIDAL STRAIN: 13 %
INTERVENTRICULAR SEPTUM: 1 CM (ref 0.6–1.1)
IVC DIAMETER: 1.66 CM
LA MAJOR: 5.36 CM
LA MINOR: 5.14 CM
LA WIDTH: 4 CM
LEFT ATRIUM AREA SYSTOLIC (APICAL 2 CHAMBER): 20.4 CM2
LEFT ATRIUM AREA SYSTOLIC (APICAL 4 CHAMBER): 19.24 CM2
LEFT ATRIUM SIZE: 3.92 CM
LEFT ATRIUM VOLUME INDEX MOD: 28.1 ML/M2
LEFT ATRIUM VOLUME INDEX: 33 ML/M2
LEFT ATRIUM VOLUME MOD: 59.47 ML
LEFT ATRIUM VOLUME: 69.94 CM3
LEFT INTERNAL DIMENSION IN SYSTOLE: 3 CM (ref 2.1–4)
LEFT VENTRICLE DIASTOLIC VOLUME INDEX: 43.19 ML/M2
LEFT VENTRICLE DIASTOLIC VOLUME: 91.57 ML
LEFT VENTRICLE END SYSTOLIC VOLUME APICAL 2 CHAMBER: 64.79 ML
LEFT VENTRICLE END SYSTOLIC VOLUME APICAL 4 CHAMBER: 51.41 ML
LEFT VENTRICLE MASS INDEX: 67.4 G/M2
LEFT VENTRICLE SYSTOLIC VOLUME INDEX: 16.8 ML/M2
LEFT VENTRICLE SYSTOLIC VOLUME: 35.68 ML
LEFT VENTRICULAR INTERNAL DIMENSION IN DIASTOLE: 4.5 CM (ref 3.5–6)
LEFT VENTRICULAR MASS: 142.9 G
LV LATERAL E/E' RATIO: 6.89 M/S
LV SEPTAL E/E' RATIO: 7.75 M/S
LVED V (TEICH): 91.57 ML
LVES V (TEICH): 35.68 ML
LVOT MG: 1.99 MMHG
LVOT MV: 0.65 CM/S
MV PEAK A VEL: 0.81 M/S
MV PEAK E VEL: 0.62 M/S
MV STENOSIS PRESSURE HALF TIME: 68.68 MS
MV VALVE AREA P 1/2 METHOD: 3.2 CM2
OHS CV RV/LV RATIO: 0.98 CM
OHS QRS DURATION: 154 MS
OHS QTC CALCULATION: 469 MS
PISA TR MAX VEL: 2.53 M/S
PULM VEIN S/D RATIO: 0.89
PV PEAK D VEL: 0.38 M/S
PV PEAK GRADIENT: 3 MMHG
PV PEAK S VEL: 0.34 M/S
PV PEAK VELOCITY: 0.89 M/S
RA MAJOR: 5.42 CM
RA PRESSURE ESTIMATED: 3 MMHG
RA WIDTH: 3.8 CM
RIGHT VENTRICLE DIASTOLIC BASEL DIMENSION: 4.4 CM
RV TB RVSP: 6 MMHG
RV TISSUE DOPPLER FREE WALL SYSTOLIC VELOCITY 1 (APICAL 4 CHAMBER VIEW): 12.85 CM/S
SINUS: 3.1 CM
STJ: 2.38 CM
TDI LATERAL: 0.09 M/S
TDI SEPTAL: 0.08 M/S
TDI: 0.09 M/S
TR MAX PG: 26 MMHG
TRICUSPID ANNULAR PLANE SYSTOLIC EXCURSION: 2.5 CM
TV REST PULMONARY ARTERY PRESSURE: 29 MMHG
Z-SCORE OF LEFT VENTRICULAR DIMENSION IN END DIASTOLE: -3.98
Z-SCORE OF LEFT VENTRICULAR DIMENSION IN END SYSTOLE: -2.45

## 2024-10-10 PROCEDURE — 93356 MYOCRD STRAIN IMG SPCKL TRCK: CPT | Mod: ,,, | Performed by: INTERNAL MEDICINE

## 2024-10-10 PROCEDURE — 93306 TTE W/DOPPLER COMPLETE: CPT | Mod: 26,,, | Performed by: INTERNAL MEDICINE

## 2024-10-10 PROCEDURE — 93010 ELECTROCARDIOGRAM REPORT: CPT | Mod: ,,, | Performed by: INTERNAL MEDICINE

## 2024-10-10 PROCEDURE — 93005 ELECTROCARDIOGRAM TRACING: CPT

## 2024-10-10 PROCEDURE — 93306 TTE W/DOPPLER COMPLETE: CPT

## 2025-01-08 ENCOUNTER — OFFICE VISIT (OUTPATIENT)
Dept: CARDIOLOGY | Facility: CLINIC | Age: 70
End: 2025-01-08
Payer: MEDICARE

## 2025-01-08 ENCOUNTER — OFFICE VISIT (OUTPATIENT)
Dept: INTERNAL MEDICINE | Facility: CLINIC | Age: 70
End: 2025-01-08
Attending: FAMILY MEDICINE
Payer: MEDICARE

## 2025-01-08 VITALS
BODY MASS INDEX: 30.06 KG/M2 | HEART RATE: 89 BPM | OXYGEN SATURATION: 95 % | DIASTOLIC BLOOD PRESSURE: 60 MMHG | WEIGHT: 210 LBS | SYSTOLIC BLOOD PRESSURE: 110 MMHG | HEIGHT: 70 IN

## 2025-01-08 VITALS — HEART RATE: 62 BPM | BODY MASS INDEX: 30.12 KG/M2 | OXYGEN SATURATION: 97 % | WEIGHT: 209.88 LBS

## 2025-01-08 DIAGNOSIS — I10 PRIMARY HYPERTENSION: ICD-10-CM

## 2025-01-08 DIAGNOSIS — I10 PRIMARY HYPERTENSION: Primary | ICD-10-CM

## 2025-01-08 DIAGNOSIS — I25.10 CAD S/P PERCUTANEOUS CORONARY ANGIOPLASTY: ICD-10-CM

## 2025-01-08 DIAGNOSIS — J43.1 PANLOBULAR EMPHYSEMA: ICD-10-CM

## 2025-01-08 DIAGNOSIS — E78.5 HYPERLIPIDEMIA WITH TARGET LOW DENSITY LIPOPROTEIN (LDL) CHOLESTEROL LESS THAN 130 MG/DL: ICD-10-CM

## 2025-01-08 DIAGNOSIS — F11.20 OPIOID DEPENDENCE, UNCOMPLICATED: ICD-10-CM

## 2025-01-08 DIAGNOSIS — Z98.61 CAD S/P PERCUTANEOUS CORONARY ANGIOPLASTY: ICD-10-CM

## 2025-01-08 DIAGNOSIS — I25.10 ATHEROSCLEROSIS OF NATIVE CORONARY ARTERY OF NATIVE HEART WITHOUT ANGINA PECTORIS: Primary | ICD-10-CM

## 2025-01-08 PROCEDURE — 3078F DIAST BP <80 MM HG: CPT | Mod: CPTII,S$GLB,, | Performed by: FAMILY MEDICINE

## 2025-01-08 PROCEDURE — 1125F AMNT PAIN NOTED PAIN PRSNT: CPT | Mod: CPTII,S$GLB,, | Performed by: FAMILY MEDICINE

## 2025-01-08 PROCEDURE — 1159F MED LIST DOCD IN RCRD: CPT | Mod: CPTII,S$GLB,, | Performed by: INTERNAL MEDICINE

## 2025-01-08 PROCEDURE — 1126F AMNT PAIN NOTED NONE PRSNT: CPT | Mod: CPTII,S$GLB,, | Performed by: INTERNAL MEDICINE

## 2025-01-08 PROCEDURE — 3074F SYST BP LT 130 MM HG: CPT | Mod: CPTII,S$GLB,, | Performed by: FAMILY MEDICINE

## 2025-01-08 PROCEDURE — 1160F RVW MEDS BY RX/DR IN RCRD: CPT | Mod: CPTII,S$GLB,, | Performed by: FAMILY MEDICINE

## 2025-01-08 PROCEDURE — 3288F FALL RISK ASSESSMENT DOCD: CPT | Mod: CPTII,S$GLB,, | Performed by: FAMILY MEDICINE

## 2025-01-08 PROCEDURE — 1160F RVW MEDS BY RX/DR IN RCRD: CPT | Mod: CPTII,S$GLB,, | Performed by: INTERNAL MEDICINE

## 2025-01-08 PROCEDURE — 1101F PT FALLS ASSESS-DOCD LE1/YR: CPT | Mod: CPTII,S$GLB,, | Performed by: INTERNAL MEDICINE

## 2025-01-08 PROCEDURE — 99214 OFFICE O/P EST MOD 30 MIN: CPT | Mod: S$GLB,,, | Performed by: FAMILY MEDICINE

## 2025-01-08 PROCEDURE — G2211 COMPLEX E/M VISIT ADD ON: HCPCS | Mod: S$GLB,,, | Performed by: FAMILY MEDICINE

## 2025-01-08 PROCEDURE — 4010F ACE/ARB THERAPY RXD/TAKEN: CPT | Mod: CPTII,S$GLB,, | Performed by: INTERNAL MEDICINE

## 2025-01-08 PROCEDURE — 4010F ACE/ARB THERAPY RXD/TAKEN: CPT | Mod: CPTII,S$GLB,, | Performed by: FAMILY MEDICINE

## 2025-01-08 PROCEDURE — 3008F BODY MASS INDEX DOCD: CPT | Mod: CPTII,S$GLB,, | Performed by: INTERNAL MEDICINE

## 2025-01-08 PROCEDURE — 99214 OFFICE O/P EST MOD 30 MIN: CPT | Mod: S$GLB,,, | Performed by: INTERNAL MEDICINE

## 2025-01-08 PROCEDURE — 3008F BODY MASS INDEX DOCD: CPT | Mod: CPTII,S$GLB,, | Performed by: FAMILY MEDICINE

## 2025-01-08 PROCEDURE — 1159F MED LIST DOCD IN RCRD: CPT | Mod: CPTII,S$GLB,, | Performed by: FAMILY MEDICINE

## 2025-01-08 PROCEDURE — 3288F FALL RISK ASSESSMENT DOCD: CPT | Mod: CPTII,S$GLB,, | Performed by: INTERNAL MEDICINE

## 2025-01-08 PROCEDURE — 99999 PR PBB SHADOW E&M-EST. PATIENT-LVL V: CPT | Mod: PBBFAC,,, | Performed by: INTERNAL MEDICINE

## 2025-01-08 PROCEDURE — 99999 PR PBB SHADOW E&M-EST. PATIENT-LVL IV: CPT | Mod: PBBFAC,,, | Performed by: FAMILY MEDICINE

## 2025-01-08 PROCEDURE — 1101F PT FALLS ASSESS-DOCD LE1/YR: CPT | Mod: CPTII,S$GLB,, | Performed by: FAMILY MEDICINE

## 2025-01-08 RX ORDER — ASPIRIN 81 MG/1
81 TABLET ORAL DAILY
COMMUNITY
Start: 2025-01-08 | End: 2026-01-08

## 2025-01-08 RX ORDER — ALBUTEROL SULFATE 90 UG/1
1-2 INHALANT RESPIRATORY (INHALATION) EVERY 6 HOURS PRN
Qty: 18 G | Refills: 0 | Status: SHIPPED | OUTPATIENT
Start: 2025-01-08

## 2025-01-08 RX ORDER — POLYETHYLENE GLYCOL 3350 17 G/17G
17 POWDER, FOR SOLUTION ORAL DAILY
Qty: 510 G | Refills: 11 | Status: SHIPPED | OUTPATIENT
Start: 2025-01-08 | End: 2026-01-08

## 2025-01-08 RX ORDER — FLUTICASONE PROPIONATE 50 MCG
2 SPRAY, SUSPENSION (ML) NASAL DAILY
Qty: 16 G | Refills: 3 | Status: SHIPPED | OUTPATIENT
Start: 2025-01-08

## 2025-01-08 RX ORDER — PREGABALIN 300 MG/1
300 CAPSULE ORAL 2 TIMES DAILY
COMMUNITY
Start: 2025-01-06

## 2025-01-08 NOTE — PROGRESS NOTES
CHIEF COMPLAINT: Follow-up w/hypertension and hyperlipidemia    HISTORY OF PRESENT ILLNESS: The patient is a 69 -year-old white male.  Blood pressure excellent today.  He is set to see cardiology following me    Previously seen in the emergency room with elevated blood pressure.  Also bradycardia.  He was referred to Cardiology.  Medication changes as below    Fall 6/23 resulted in him ultimately getting an MRI 7/23.  Multiple bilateral renal cysts both simple and complex were seen.    SBO resolved with conservative Tx    The patient has chronic back pain.  He can no longer see his previous pain management physician due to insurance reasons.      The patient has a history of stable hypertension on current medications.  Patient denies chest pain or shortness of breath today.    The patient has a history of stable hyperlipidemia on current medications.  The patient denies chest pain or shortness of breath today.  The patient denies muscle aches or myalgias suggestive of myositis.    REVIEW OF SYSTEMS:  GENERAL: No fever, chills, fatigability or weight loss.  SKIN: No rashes, itching or changes in color or texture of skin.  HEAD: No headaches or recent head trauma.  EYES: Visual acuity fine. No photophobia, ocular pain or diplopia.  EARS: Denies ear pain, discharge or vertigo.  NOSE: No loss of smell, no epistaxis or postnasal drip.  MOUTH & THROAT: No hoarseness or change in voice. No excessive gum bleeding.  NODES: Denies swollen glands.  CHEST: Denies RODARTE, cyanosis, wheezing, cough and sputum production.  CARDIOVASCULAR: Denies chest pain, PND, orthopnea or reduced exercise tolerance.  ABDOMEN: Appetite fine. No weight loss. Denies diarrhea, abdominal pain, hematemesis or blood in stool.  URINARY: No flank pain, dysuria or hematuria.  PERIPHERAL VASCULAR: No claudication or cyanosis.  MUSCULOSKELETAL: No joint stiffness or swelling.   NEUROLOGIC: No history of seizures, paralysis, alteration of gait or  "coordination.    SOCIAL HISTORY: The patient does not smoke.  The patient consumes alcohol socially.      PHYSICAL EXAMINATION:   Blood pressure 110/60, pulse 89, height 5' 10" (1.778 m), weight 95.2 kg (209 lb 15.8 oz), SpO2 95%.    APPEARANCE: Well nourished, well developed, in no acute distress.    HEAD: Normocephalic, atraumatic.  EYES: PERRL. EOMI.  Conjunctivae without injection and  anicteric  NOSE: Mucosa pink. Airway clear.  MOUTH & THROAT: No tonsillar enlargement. No pharyngeal erythema or exudate. No stridor.  NECK: Supple.   NODES: No cervical, axillary or inguinal lymph node enlargement.  CHEST: Lungs clear to auscultation.  No retractions are noted.  No rales or rhonchi are present.  CARDIOVASCULAR: Normal S1, S2. No rubs, murmurs or gallops.  ABDOMEN: Bowel sounds normal. Not distended. Soft. No tenderness or masses.  No ascites is noted.  MUSCULOSKELETAL:  There is no clubbing, cyanosis, or edema of the extremities x4.  There is full range of motion of the lumbar spine.  There is full range of motion of the extremities x4.  There is no deformity noted.    NEUROLOGIC:       Normal speech development.      Hearing normal.      Normal gait.      Motor and sensory exams grossly normal.  PSYCHIATRIC: Patient is alert and oriented x3.  Thought processes are all normal.  There is no homicidality.  There is no suicidality.  There is no evidence of psychosis.    LABORATORY/RADIOLOGY:   Chart reviewed.      ASSESSMENT:   Asymptomatic bradycardia due to beta-blocker, resolved  Hypertension    SBO, resolved  Snoring likely KIRA  Chronic low back pain  Hyperlipidemia   Insomnia      PLAN:  Continue current medication  Ophthalmology referral  Sleep clinic referral  Trazodone 100 qhs  Return to clinic in 3 months same day as cardiology appointment    "

## 2025-01-08 NOTE — PROGRESS NOTES
[facility]    Chief Complaint  Chief Complaint   Patient presents with    Coronary Artery Disease       HPI:    Patient presents to establish cardiac follow-up.  Around 2004, he developed sudden onset of chest pain.  Ultimately went to [facility].  Details are uncertain but sounds like he had an intervention performed for acute coronary syndrome.  Probably had a stent implanted.  Has done well since without recurrence.  Most recently followed by Dr. Franco but has been a few years since he has seen him.  Went to urgent care for cough.  Heart rate was noted to be low.  Metoprolol was tapered off.  No problems since.  In fact, low heart rate was not causing any problems at the time.  He reports being active without exertional dyspnea or chest discomfort.  Activities are mostly limited by sciatica.  No syncope or presyncope.  No history of stroke or TIA.    Medications  Current Outpatient Medications   Medication Sig Dispense Refill    albuterol (PROVENTIL/VENTOLIN HFA) 90 mcg/actuation inhaler Inhale 1-2 puffs into the lungs every 6 (six) hours as needed for Wheezing. Rescue 18 g 0    amLODIPine (NORVASC) 10 MG tablet Take 1 tablet (10 mg total) by mouth once daily. 90 tablet 3    atorvastatin (LIPITOR) 80 MG tablet Take 1 tablet (80 mg total) by mouth nightly. FOR cholesterol 90 tablet 3    carvediloL (COREG) 3.125 MG tablet Take 1 tablet (3.125 mg total) by mouth 2 (two) times daily with meals. 180 tablet 3    cyproheptadine (PERIACTIN) 4 mg tablet Take 1 tablet (4 mg total) by mouth 3 (three) times daily. 90 tablet 0    diclofenac sodium (VOLTAREN) 1 % Gel Apply 2 g topically 2 (two) times daily. for 10 days 50 g 0    fluticasone propionate (FLONASE) 50 mcg/actuation nasal spray 2 sprays (100 mcg total) by Each Nostril route once daily. 16 g 3    hydroCHLOROthiazide (MICROZIDE) 12.5 mg capsule Take 1 capsule (12.5 mg total) by mouth once daily. 90 capsule 3    lisinopriL  (PRINIVIL,ZESTRIL) 40 MG tablet Take 1 tablet (40 mg total) by mouth once daily. 90 tablet 3    Lmefol Ca-acetyl-meB12-algal (CEREFOLIN NAC, ALGAL OIL,) 6 mg-600 mg- 2 mg-90.314 mg Tab 1 tablet Orally Once a day for 30 day(s)      magnesium oxide-Mg AA chelate (MAGNESIUM, OXIDE/AA CHELATE,) 300 mg Cap 1 capsule with a meal Orally Once a day for 30 day(s)      omeprazole (PRILOSEC) 40 MG capsule Take 1 capsule (40 mg total) by mouth once daily. 90 capsule 3    oxyCODONE-acetaminophen (PERCOCET)  mg per tablet Take 1 tablet by mouth every 8 (eight) hours as needed for Pain.      polyethylene glycol (GLYCOLAX) 17 gram/dose powder Take 17 g by mouth once daily. 510 g 11    pregabalin (LYRICA) 300 MG Cap Take 300 mg by mouth 2 (two) times daily.      rimegepant (NURTEC) 75 mg odt 1 tablet on the tongue and allow to dissolve Orally one tablet every other day for 30 days      tiZANidine (ZANAFLEX) 4 MG tablet Take 4 mg by mouth 2 (two) times daily.      doxycycline (MONODOX) 100 MG capsule Take 100 mg by mouth 2 (two) times daily. (Patient not taking: Reported on 10/8/2024)      LIDOcaine (LIDODERM) 5 % Place 1 patch onto the skin once daily. Remove & Discard patch within 12 hours or as directed by MD (Patient not taking: Reported on 10/8/2024) 15 patch 0    naproxen (NAPROSYN) 500 MG tablet Take 1 tablet (500 mg total) by mouth 2 (two) times daily with meals. (Patient not taking: Reported on 1/8/2025) 20 tablet 0     No current facility-administered medications for this visit.        History  Past Medical History:   Diagnosis Date    Coronary artery disease     Emphysema of lung     Hyperlipidemia     Hypertension      Past Surgical History:   Procedure Laterality Date    CARDIAC CATHETERIZATION  2004    x2    HIP SURGERY       Social History     Socioeconomic History    Marital status: Single   Tobacco Use    Smoking status: Former     Current packs/day: 0.00     Types: Cigarettes     Quit date: 1/1/1993     Years  since quittin.0    Smokeless tobacco: Never   Substance and Sexual Activity    Alcohol use: Yes     Comment: 2-3/ week    Drug use: Yes     Types: Marijuana     Comment: every now and then    Sexual activity: Yes     Social Drivers of Health     Financial Resource Strain: Medium Risk (2024)    Overall Financial Resource Strain (CARDIA)     Difficulty of Paying Living Expenses: Somewhat hard   Food Insecurity: Food Insecurity Present (2024)    Hunger Vital Sign     Worried About Running Out of Food in the Last Year: Sometimes true     Ran Out of Food in the Last Year: Sometimes true   Physical Activity: Insufficiently Active (2024)    Exercise Vital Sign     Days of Exercise per Week: 3 days     Minutes of Exercise per Session: 20 min   Stress: No Stress Concern Present (2024)    [employer] - Occupational Stress Questionnaire     Feeling of Stress : Not at all   Housing Stability: Unknown (2024)    Housing Stability Vital Sign     Unable to Pay for Housing in the Last Year: No     Family History   Problem Relation Name Age of Onset    Heart attack Mother      Heart disease Mother      Hypertension Mother          Allergies  Review of patient's allergies indicates:  No Known Allergies    Review of Systems   Review of Systems   Constitutional: Negative for malaise/fatigue, weight gain and weight loss.   Eyes:  Negative for visual disturbance.   Cardiovascular:  Negative for chest pain, claudication, cyanosis, dyspnea on exertion, irregular heartbeat, leg swelling, near-syncope, orthopnea, palpitations, paroxysmal nocturnal dyspnea and syncope.   Respiratory:  Negative for cough, hemoptysis, shortness of breath, sleep disturbances due to breathing and wheezing.    Hematologic/Lymphatic: Negative for bleeding problem. Does not bruise/bleed easily.   Skin:  Negative for poor wound healing.   Musculoskeletal:  Negative for muscle cramps and myalgias.    Gastrointestinal:  Negative for abdominal pain, anorexia, diarrhea, heartburn, hematemesis, hematochezia, melena, nausea and vomiting.   Genitourinary:  Negative for hematuria and nocturia.   Neurological:  Negative for excessive daytime sleepiness, dizziness, focal weakness, light-headedness and weakness.       Physical Exam  Vitals:    01/08/25 1359   BP: (P) 132/74   Pulse: 62     Wt Readings from Last 1 Encounters:   01/08/25 95.2 kg (209 lb 14.4 oz)     Physical Exam  Vitals and nursing note reviewed.   Constitutional:       General: He is not in acute distress.     Appearance: He is not toxic-appearing or diaphoretic.   HENT:      Head: Normocephalic and atraumatic.      Mouth/Throat:      Lips: Pink.      Mouth: Mucous membranes are moist.   Eyes:      General: No scleral icterus.     Conjunctiva/sclera: Conjunctivae normal.   Neck:      Thyroid: No thyromegaly.      Vascular: No carotid bruit, hepatojugular reflux or JVD.   Cardiovascular:      Rate and Rhythm: Normal rate and regular rhythm. No extrasystoles are present.     Chest Wall: PMI is not displaced.      Pulses:           Carotid pulses are 2+ on the right side and 2+ on the left side.       Radial pulses are 2+ on the right side and 2+ on the left side.        Dorsalis pedis pulses are 2+ on the right side and 2+ on the left side.      Heart sounds: S1 normal and S2 normal. No murmur heard.     No friction rub. No S3 or S4 sounds.   Pulmonary:      Effort: Pulmonary effort is normal. No tachypnea, bradypnea, accessory muscle usage or respiratory distress.      Breath sounds: Normal breath sounds and air entry. No decreased breath sounds, wheezing, rhonchi or rales.   Abdominal:      General: Bowel sounds are normal. There is no distension or abdominal bruit.      Palpations: Abdomen is soft. There is no hepatomegaly, splenomegaly or pulsatile mass.      Tenderness: There is no abdominal tenderness.   Musculoskeletal:         General: No  tenderness or deformity.      Right lower leg: No edema.      Left lower leg: No edema.   Skin:     General: Skin is warm and dry.      Capillary Refill: Capillary refill takes less than 2 seconds.      Coloration: Skin is not cyanotic or pale.      Nails: There is no clubbing.   Neurological:      General: No focal deficit present.      Mental Status: He is alert and oriented to person, place, and time.   Psychiatric:         Attention and Perception: Attention normal.         Mood and Affect: Mood normal.         Speech: Speech normal.         Behavior: Behavior normal. Behavior is cooperative.         Labs  Hospital Outpatient Visit on 10/10/2024   Component Date Value Ref Range Status    BSA 10/10/2024 2.15  m2 Final    RA Width 10/10/2024 3.8  cm Final    Sinus 10/10/2024 3.1  cm Final    LVOT stroke volume 10/10/2024 69.7  cm3 Final    LVIDd 10/10/2024 4.5  3.5 - 6.0 cm Final    LV Systolic Volume 10/10/2024 35.68  mL Final    LV Systolic Volume Index 10/10/2024 16.8  mL/m2 Final    LVIDs 10/10/2024 3.0  2.1 - 4.0 cm Final    LV ESV A2C 10/10/2024 64.79  mL Final    LV Diastolic Volume 10/10/2024 91.57  mL Final    LV ESV A4C 10/10/2024 51.41  mL Final    LV Diastolic Volume Index 10/10/2024 43.19  mL/m2 Final    Left Ventricular End Systolic Volu* 10/10/2024 35.68  mL Final    Left Ventricular End Diastolic Vol* 10/10/2024 91.57  mL Final    IVS 10/10/2024 1.0  0.6 - 1.1 cm Final    LVOT diameter 10/10/2024 2.0  cm Final    LVOT area 10/10/2024 3.1  cm2 Final    FS 10/10/2024 33.3  28 - 44 % Final    Left Ventricle Relative Wall Thick* 10/10/2024 0.40  cm Final    PW 10/10/2024 0.9  0.6 - 1.1 cm Final    LV mass 10/10/2024 142.9  g Final    LV Mass Index 10/10/2024 67.4  g/m2 Final    MV Peak E Sheng 10/10/2024 0.62  m/s Final    TDI LATERAL 10/10/2024 0.09  m/s Final    TDI SEPTAL 10/10/2024 0.08  m/s Final    E/E' ratio 10/10/2024 7.29  m/s Final    MV Peak A Sheng 10/10/2024 0.81  m/s Final    TR Max Sheng  10/10/2024 2.53  m/s Final    E/A ratio 10/10/2024 0.77   Final    E wave deceleration time 10/10/2024 236.81  msec Final    LV SEPTAL E/E' RATIO 10/10/2024 7.75  m/s Final    LV LATERAL E/E' RATIO 10/10/2024 6.89  m/s Final    PV Peak S Sheng 10/10/2024 0.34  m/s Final    PV Peak D Sheng 10/10/2024 0.38  m/s Final    Pulm vein S/D ratio 10/10/2024 0.89   Final    LVOT peak sheng 10/10/2024 1.0  m/s Final    Left Ventricular Outflow Tract Patricia* 10/10/2024 0.65  cm/s Final    Left Ventricular Outflow Tract Patricia* 10/10/2024 1.99  mmHg Final    RV- f[place] basal diam 10/10/2024 4.4  cm Final    RV S' 10/10/2024 12.85  cm/s Final    RV/LV Ratio 10/10/2024 0.98  cm Final    LA size 10/10/2024 3.92  cm Final    Left Atrium Minor Axis 10/10/2024 5.14  cm Final    Left Atrium Major Axis 10/10/2024 5.36  cm Final    LA Vol (MOD) 10/10/2024 59.47  mL Final    YAYA (MOD) 10/10/2024 28.1  mL/m2 Final    RA Major Axis 10/10/2024 5.42  cm Final    AV mean gradient 10/10/2024 5.1  mmHg Final    AV peak gradient 10/10/2024 10.2  mmHg Final    Ao peak sheng 10/10/2024 1.6  m/s Final    Ao VTI 10/10/2024 34.1  cm Final    LVOT peak VTI 10/10/2024 22.2  cm Final    AV valve area 10/10/2024 2.0  cm² Final    AV Velocity Ratio 10/10/2024 0.63   Final    AV index (prosthetic) 10/10/2024 0.65   Final    IZZY by Velocity Ratio 10/10/2024 2.0  cm² Final    MV stenosis pressure 1/2 time 10/10/2024 68.68  ms Final    MV valve area p 1/2 method 10/10/2024 3.20  cm2 Final    Triscuspid Valve Regurgitation Pea* 10/10/2024 26  mmHg Final    PV PEAK VELOCITY 10/10/2024 0.89  m/s Final    PV peak gradient 10/10/2024 3  mmHg Final    RVOT peak sheng 10/10/2024 0.60  m/s Final    STJ 10/10/2024 2.38  cm Final    Ascending aorta 10/10/2024 2.88  cm Final    IVC diameter 10/10/2024 1.66  cm Final    Mean e' 10/10/2024 0.09  m/s Final    ZLVIDS 10/10/2024 -2.45   Final    ZLVIDD 10/10/2024 -3.98   Final    LA area A4C 10/10/2024 19.24  cm2 Final    LA area A2C  10/10/2024 20.40  cm2 Final    YAYA 10/10/2024 33.0  mL/m2 Final    LA Vol 10/10/2024 69.94  cm3 Final    TAPSE 10/10/2024 2.50  cm Final    LA WIDTH 10/10/2024 4.0  cm Final    TV resting pulmonary artery pressu* 10/10/2024 29  mmHg Final    RV TB RVSP 10/10/2024 6  mmHg Final    Est. RA pres 10/10/2024 3  mmHg Final    EF 10/10/2024 60  % Final    GLS 10/10/2024 13.0  % Final   Hospital Outpatient Visit on 10/10/2024   Component Date Value Ref Range Status    QRS Duration 10/10/2024 154  ms Final    [facility] QTC Calculation 10/10/2024 469  ms Final   Admission on 09/24/2024, Discharged on 09/25/2024   Component Date Value Ref Range Status    QRS Duration 09/24/2024 132  ms Final    [facility] QTC Calculation 09/24/2024 440  ms Final    WBC 09/24/2024 7.79  3.90 - 12.70 K/uL Final    RBC 09/24/2024 6.08  4.60 - 6.20 M/uL Final    Hemoglobin 09/24/2024 13.8 (L)  14.0 - 18.0 g/dL Final    Hematocrit 09/24/2024 44.1  40.0 - 54.0 % Final    MCV 09/24/2024 73 (L)  82 - 98 fL Final    MCH 09/24/2024 22.7 (L)  27.0 - 31.0 pg Final    MCHC 09/24/2024 31.3 (L)  32.0 - 36.0 g/dL Final    RDW 09/24/2024 17.6 (H)  11.5 - 14.5 % Final    Platelets 09/24/2024 170  150 - 450 K/uL Final    MPV 09/24/2024 SEE COMMENT  9.2 - 12.9 fL Final    Result not available.    Immature Granulocytes 09/24/2024 0.3  0.0 - 0.5 % Final    Gran # (ANC) 09/24/2024 3.5  1.8 - 7.7 K/uL Final    Immature Grans (Abs) 09/24/2024 0.02  0.00 - 0.04 K/uL Final    Comment: Mild elevation in immature granulocytes is non specific and   can be seen in a variety of conditions including stress response,   acute inflammation, trauma and pregnancy. Correlation with other   laboratory and clinical findings is essential.      Lymph # 09/24/2024 3.2  1.0 - 4.8 K/uL Final    Mono # 09/24/2024 1.0  0.3 - 1.0 K/uL Final    Eos # 09/24/2024 0.1  0.0 - 0.5 K/uL Final    Baso # 09/24/2024 0.06  0.00 - 0.20 K/uL Final    nRBC 09/24/2024 0  0 /100 WBC Final    Gran % 09/24/2024 44.5   38.0 - 73.0 % Final    Lymph % 09/24/2024 41.2  18.0 - 48.0 % Final    Mono % 09/24/2024 12.3  4.0 - 15.0 % Final    Eosinophil % 09/24/2024 0.9  0.0 - 8.0 % Final    Basophil % 09/24/2024 0.8  0.0 - 1.9 % Final    Differential Method 09/24/2024 Automated   Final    Sodium 09/24/2024 139  136 - 145 mmol/L Final    Potassium 09/24/2024 3.8  3.5 - 5.1 mmol/L Final    Specimen slightly hemolyzed    Chloride 09/24/2024 110  95 - 110 mmol/L Final    CO2 09/24/2024 19 (L)  23 - 29 mmol/L Final    Glucose 09/24/2024 94  70 - 110 mg/dL Final    BUN 09/24/2024 13  8 - 23 mg/dL Final    Creatinine 09/24/2024 1.1  0.5 - 1.4 mg/dL Final    Calcium 09/24/2024 8.9  8.7 - 10.5 mg/dL Final    Total Protein 09/24/2024 7.3  6.0 - 8.4 g/dL Final    Albumin 09/24/2024 3.8  3.5 - 5.2 g/dL Final    Total Bilirubin 09/24/2024 0.6  0.1 - 1.0 mg/dL Final    Comment: For infants and newborns, interpretation of results should be based  on gestational age, weight and in agreement with clinical  observations.    Premature Infant recommended reference ranges:  Up to 24 hours.............<8.0 mg/dL  Up to 48 hours............<12.0 mg/dL  3-5 days..................<15.0 mg/dL  6-29 days.................<15.0 mg/dL      Alkaline Phosphatase 09/24/2024 48 (L)  55 - 135 U/L Final    AST 09/24/2024 37  10 - 40 U/L Final    ALT 09/24/2024 37  10 - 44 U/L Final    eGFR 09/24/2024 >60  >60 mL/min/1.73 m^2 Final    Anion Gap 09/24/2024 10  8 - 16 mmol/L Final    Lipase 09/24/2024 85 (H)  4 - 60 U/L Final    Magnesium 09/24/2024 1.7  1.6 - 2.6 mg/dL Final    Troponin I 09/24/2024 0.009  0.000 - 0.026 ng/mL Final    Comment: The reference interval for Troponin I represents the 99th percentile   cutoff   for our facility and is consistent with 3rd generation assay   performance.      BNP 09/24/2024 112 (H)  0 - 99 pg/mL Final    Values of less than 100 pg/ml are consistent with non-CHF populations.       Imaging  No results found.    Assessment  1.  Atherosclerosis of native coronary artery of native heart without angina pectoris  Stable 20+ years out from intervention  - Ambulatory referral/consult to Cardiology    2. Primary hypertension  Controlled  - Ambulatory referral/consult to Cardiology      Plan and Discussion    From the cardiac standpoint, he is doing well.  Appropriate secondary prevention measures are in place.  Establish routine follow-up.    The ASCVD Risk score (Gabrielle DK, et al., 2019) failed to calculate for the following reasons:    The valid total cholesterol range is 130 to 320 mg/dL     Follow Up  Follow up in about 1 year (around 1/8/2026).      Eliud Ahn MD

## 2025-03-28 ENCOUNTER — HOSPITAL ENCOUNTER (EMERGENCY)
Facility: HOSPITAL | Age: 70
Discharge: HOME | End: 2025-03-28
Attending: EMERGENCY MEDICINE
Payer: MEDICARE

## 2025-03-28 VITALS
OXYGEN SATURATION: 98 % | HEIGHT: 69 IN | BODY MASS INDEX: 31.1 KG/M2 | HEART RATE: 56 BPM | TEMPERATURE: 99 F | RESPIRATION RATE: 18 BRPM | DIASTOLIC BLOOD PRESSURE: 81 MMHG | SYSTOLIC BLOOD PRESSURE: 147 MMHG | WEIGHT: 210 LBS
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
## 2025-03-28 DIAGNOSIS — M79.601 RIGHT ARM PAIN: ICD-10-CM

## 2025-03-28 DIAGNOSIS — M79.605 LEFT LEG PAIN: ICD-10-CM

## 2025-03-28 PROCEDURE — 25000003 PHARM REV CODE 250: Mod: ER | Performed by: NURSE PRACTITIONER

## 2025-03-28 PROCEDURE — 99284 EMERGENCY DEPT VISIT MOD MDM: CPT | Mod: 25,ER

## 2025-03-28 RX ORDER — ACETAMINOPHEN 325 MG/1
650 TABLET ORAL
Status: COMPLETED | OUTPATIENT
Start: 2025-03-28 | End: 2025-03-28

## 2025-03-28 RX ADMIN — ACETAMINOPHEN 650 MG: 325 TABLET ORAL at 01:03

## 2025-03-28 NOTE — DISCHARGE INSTRUCTIONS
Thank you for coming to our Emergency Department today. It is important to remember that some problems or medical conditions are difficult to diagnose and may not be found during your Emergency Department visit.     Be sure to follow up with your primary care doctor and review all labs/imaging/tests that were performed during your ER visit with them. Some labs/tests may be outside of the normal range and require non-emergent follow-up and further investigation to help diagnose/exclude/prevent complications or other potentially serious medical conditions that were not addressed during your ER visit.    If you do not have a primary care doctor, you may contact the one listed on your discharge paperwork or you may also call the [facility] Appointment Desk at [phone] to schedule an appointment and establish care with one. It is important to your health that you have a primary care doctor.    Please take all medications as directed. All medications may potentially have side-effects and it is impossible to predict which medications may give you side-effects or what side-effects (if any) they will give you.. If you feel that you are having a negative effect or side-effect of any medication you should immediately stop taking them and seek medical attention. If you feel that you are having a life-threatening reaction call 911.    Return to the ER with any questions/concerns, new/concerning symptoms, worsening or failure to improve.     Do not drive, swim, climb to height, take a bath, operate heavy machinery, drink alcohol or take potentially sedating medications, sign any legal documents or make any important decisions for 24 hours if you have received any pain medications, sedatives or mood altering drugs during your ER visit or within 24 hours of taking them if they have been prescribed to you.     You can find additional resources for Dentists, hearing aids, durable medical equipment, low cost pharmacies and  other resources at [email or web address].org    BELOW THIS LINE ONLY APPLIES IF YOU HAVE A COVID TEST PENDING OR IF YOU HAVE BEEN DIAGNOSED WITH COVID:  Please access [facility] to review the results of your test. Until the results of your COVID test return, you should isolate yourself so as not to potentially spread illness to others.   If your COVID test returns positive, you should isolate yourself so as not to spread illness to others. After five full days, if you are feeling better and you have not had fever for 24 hours, you can return to your typical daily activities, but you must wear a mask around others for an additional 5 days.   If your COVID test returns negative and you are either unvaccinated or more than six months out from your two-dose vaccine and are not yet boosted, you should still quarantine for 5 full days followed by strict mask use for an additional 5 full days.   If your COVID test returns negative and you have received your 2-dose initial vaccine as well as a booster, you should continue strict mask use for 10 full days after the exposure.  For all those exposed, best practice includes a test at day 5 after the exposure. This can be a home test or a test through one of the many testing centers throughout our community.   Masking is always advised to limit the spread of COVID. Cdc.gov is an excellent site to obtain the latest up to date recommendations regarding COVID and COVID testing.     CDC Testing and Quarantine Guidelines for patients with exposure to a known-positive COVID-19 person:  A close exposure is defined as anyone who has had an exposure (masked or unmasked) to a known COVID -19 positive person within 6 feet of someone for a cumulative total of 15 minutes or more over a 24-hour period.   Vaccinated and/or if you recently had a positive covid test within 90 days do NOT need to quarantine after contact with someone who had COVID-19 unless you develop symptoms.   Fully vaccinated  people who have not had a positive test within 90 days, should get tested 3-5 days after their exposure, even if they don't have symptoms and wear a mask indoors in public for 14 days following exposure or until their test result is negative.      Unvaccinated and/or NOT had a positive test within 90 days and meet close exposure  You are required by CDC guidelines to quarantine for at least 5 days from time of exposure followed by 5 days of strict masking. It is recommended, but not required to test after 5 days, unless you develop symptoms, in which case you should test at that time.  If you get tested after 5 days and your test is positive, your 5 day period of isolation starts the day of the positive test.    If your exposure does not meet the above definition, you can return to your normal daily activities to include social distancing, wearing a mask and frequent handwashing.      Here is a link to guidance from the CDC:  https://www.cdc.gov/media/releases/2021/s1227-isolation-quarantine-guidance.html      [place] Dept Of Health Testing Sites:  https://ldh.la.gov/page/3934      [facility] website with testing locations and guidance:  [email or web address]
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
## 2025-03-29 ENCOUNTER — PATIENT OUTREACH (OUTPATIENT)
Facility: OTHER | Age: 70
End: 2025-03-29
Payer: MEDICARE

## 2025-03-29 NOTE — PROGRESS NOTES
Eloina Feliciano  ED Navigator  Emergency Department    Project: [facility] ED Navigator  Role: Community Health Worker    Date: 2025  Patient Name: Tee Mtz  MRN: [id number]  PCP: Jj Montgomery MD    Assessment:     Tee Mtz is a 69 y.o. male who has presented to ED for fall. Patient has visited the ED 1 times in the past 3 months. Patient did not contact PCP.     ED Navigator Initial Assessment    ED Navigator Enrollment Documentation  Consent to Services  Does patient consent to completing the assessment?: Yes  Contact  Method of Initial Contact: Phone  Transportation  Insurance Coverage  Do you have coverage/adequate coverage?: Yes  Specialist Appointment  Did the patient come to the ED to see a specialist?: No  Does the patient have a pending specialist referral?: No  Does the patient have a specialist appointment made?: No  PCP Follow Up Appointment  Medications  Is patient able to afford medication?: Yes  Psychological  Food  Communication/Education  Other Financial Concerns  Other Social Barriers/Concerns  Primary Barrier  Plan: Provided information on SNAP Benefits ([employer] [place])         Social History     Socioeconomic History    Marital status: Single   Tobacco Use    Smoking status: Former     Current packs/day: 0.00     Types: Cigarettes     Quit date: 1993     Years since quittin.2    Smokeless tobacco: Never   Substance and Sexual Activity    Alcohol use: Yes     Comment: 2-3/ week    Drug use: Yes     Types: Marijuana     Comment: every now and then    Sexual activity: Yes     Social Drivers of Health     Financial Resource Strain: Medium Risk (3/29/2025)    Overall Financial Resource Strain (CARDIA)     Difficulty of Paying Living Expenses: Somewhat hard   Food Insecurity: Food Insecurity Present (3/29/2025)    Hunger Vital Sign     Worried About Running Out of Food in the Last Year: Sometimes true     Ran Out of Food in the Last Year: Sometimes true   Transportation Needs: No  Transportation Needs (3/29/2025)    PRAPARE - Transportation     Lack of Transportation (Medical): No     Lack of Transportation (Non-Medical): No   Physical Activity: Insufficiently Active (6/18/2024)    Exercise Vital Sign     Days of Exercise per Week: 3 days     Minutes of Exercise per Session: 20 min   Stress: No Stress Concern Present (3/29/2025)    [employer] - Occupational Stress Questionnaire     Feeling of Stress : Not at all   Housing Stability: Low Risk  (3/29/2025)    Housing Stability Vital Sign     Unable to Pay for Housing in the Last Year: No     Homeless in the Last Year: No       Plan:   Spoke with patient on the telephone for [facility] ED Navigation.  Patient was seen in the ED on 03/28/25 following a fall while fishing.  Patient reported he is still experiencing a lot of pain.  Patient does not have a follow up appointment scheduled and accepted my offer to assist with this.  I attempted to schedule a visit in [facility] with Dr. Montgomery but was unable to do so.  A message was sent to his staff requesting scheduling assistance.  In discussing SDOH topics, patient reported he could use help with monthly bills and food.  No additional needs or concerns were reported.  An email was sent to patient at [email or web address] with information on food and bill resources.  He was encouraged to contact me for help with anything.      Eloina Feliciano  ED Navigator

## 2025-03-31 ENCOUNTER — HOSPITAL ENCOUNTER (EMERGENCY)
Facility: HOSPITAL | Age: 70
Discharge: HOME | End: 2025-03-31
Attending: INTERNAL MEDICINE
Payer: MEDICARE

## 2025-03-31 ENCOUNTER — TELEPHONE (OUTPATIENT)
Dept: INTERNAL MEDICINE | Facility: CLINIC | Age: 70
End: 2025-03-31
Payer: MEDICARE

## 2025-03-31 VITALS
RESPIRATION RATE: 18 BRPM | HEIGHT: 69 IN | HEART RATE: 53 BPM | SYSTOLIC BLOOD PRESSURE: 134 MMHG | WEIGHT: 210 LBS | DIASTOLIC BLOOD PRESSURE: 90 MMHG | TEMPERATURE: 99 F | OXYGEN SATURATION: 99 % | BODY MASS INDEX: 31.1 KG/M2

## 2025-03-31 DIAGNOSIS — S09.90XA INJURY OF HEAD, INITIAL ENCOUNTER: Primary | ICD-10-CM

## 2025-03-31 DIAGNOSIS — S06.0X0A CONCUSSION WITHOUT LOSS OF CONSCIOUSNESS, INITIAL ENCOUNTER: ICD-10-CM

## 2025-03-31 PROCEDURE — 25000003 PHARM REV CODE 250: Mod: ER | Performed by: INTERNAL MEDICINE

## 2025-03-31 PROCEDURE — 99284 EMERGENCY DEPT VISIT MOD MDM: CPT | Mod: 25,ER

## 2025-03-31 RX ORDER — BUTALBITAL, ACETAMINOPHEN AND CAFFEINE 50; 325; 40 MG/1; MG/1; MG/1
2 TABLET ORAL EVERY 8 HOURS PRN
Qty: 30 TABLET | Refills: 0 | Status: SHIPPED | OUTPATIENT
Start: 2025-03-31 | End: 2025-03-31 | Stop reason: SDUPTHER

## 2025-03-31 RX ORDER — BUTALBITAL, ACETAMINOPHEN AND CAFFEINE 50; 325; 40 MG/1; MG/1; MG/1
2 TABLET ORAL
Status: COMPLETED | OUTPATIENT
Start: 2025-03-31 | End: 2025-03-31

## 2025-03-31 RX ORDER — BUTALBITAL, ACETAMINOPHEN AND CAFFEINE 50; 325; 40 MG/1; MG/1; MG/1
2 TABLET ORAL EVERY 8 HOURS PRN
Qty: 30 TABLET | Refills: 0 | Status: SHIPPED | OUTPATIENT
Start: 2025-03-31 | End: 2025-03-31

## 2025-03-31 RX ADMIN — BUTALBITAL, ACETAMINOPHEN, AND CAFFEINE 2 TABLET: 325; 50; 40 TABLET ORAL at 07:03

## 2025-03-31 NOTE — TELEPHONE ENCOUNTER
Called pt  Pt is requesting to be seen form headaches that he has been having.  Also needs ED follow up.   Appt scheduled for 4/1/25

## 2025-03-31 NOTE — TELEPHONE ENCOUNTER
----- Message from Eloina Feliciano sent at 3/29/2025 10:52 AM CDT -----  Regarding: ED Follow Up  Hello,This patient was seen in the ED on 03/28/25 and needs a 7-day follow up appointment.  I was not able to schedule a visit for him in [facility].  Could you please assist in scheduling an appointment.  Thank you!Eloina Dockery Navigator

## 2025-04-01 ENCOUNTER — OFFICE VISIT (OUTPATIENT)
Dept: INTERNAL MEDICINE | Facility: CLINIC | Age: 70
End: 2025-04-01
Attending: FAMILY MEDICINE
Payer: MEDICARE

## 2025-04-01 VITALS
DIASTOLIC BLOOD PRESSURE: 64 MMHG | WEIGHT: 200.38 LBS | HEIGHT: 69 IN | BODY MASS INDEX: 29.68 KG/M2 | SYSTOLIC BLOOD PRESSURE: 126 MMHG | HEART RATE: 60 BPM | OXYGEN SATURATION: 99 %

## 2025-04-01 DIAGNOSIS — S06.0X0D CONCUSSION WITHOUT LOSS OF CONSCIOUSNESS, SUBSEQUENT ENCOUNTER: Primary | ICD-10-CM

## 2025-04-01 DIAGNOSIS — I10 PRIMARY HYPERTENSION: ICD-10-CM

## 2025-04-01 DIAGNOSIS — F11.20 OPIOID DEPENDENCE, UNCOMPLICATED: ICD-10-CM

## 2025-04-01 DIAGNOSIS — I25.10 CAD S/P PERCUTANEOUS CORONARY ANGIOPLASTY: ICD-10-CM

## 2025-04-01 DIAGNOSIS — Z98.61 CAD S/P PERCUTANEOUS CORONARY ANGIOPLASTY: ICD-10-CM

## 2025-04-01 DIAGNOSIS — M25.511 ACUTE PAIN OF RIGHT SHOULDER: ICD-10-CM

## 2025-04-01 PROCEDURE — 1100F PTFALLS ASSESS-DOCD GE2>/YR: CPT | Mod: CPTII,S$GLB,, | Performed by: FAMILY MEDICINE

## 2025-04-01 PROCEDURE — 1125F AMNT PAIN NOTED PAIN PRSNT: CPT | Mod: CPTII,S$GLB,, | Performed by: FAMILY MEDICINE

## 2025-04-01 PROCEDURE — 3288F FALL RISK ASSESSMENT DOCD: CPT | Mod: CPTII,S$GLB,, | Performed by: FAMILY MEDICINE

## 2025-04-01 PROCEDURE — 3078F DIAST BP <80 MM HG: CPT | Mod: CPTII,S$GLB,, | Performed by: FAMILY MEDICINE

## 2025-04-01 PROCEDURE — 1160F RVW MEDS BY RX/DR IN RCRD: CPT | Mod: CPTII,S$GLB,, | Performed by: FAMILY MEDICINE

## 2025-04-01 PROCEDURE — 1159F MED LIST DOCD IN RCRD: CPT | Mod: CPTII,S$GLB,, | Performed by: FAMILY MEDICINE

## 2025-04-01 PROCEDURE — G2211 COMPLEX E/M VISIT ADD ON: HCPCS | Mod: S$GLB,,, | Performed by: FAMILY MEDICINE

## 2025-04-01 PROCEDURE — 3074F SYST BP LT 130 MM HG: CPT | Mod: CPTII,S$GLB,, | Performed by: FAMILY MEDICINE

## 2025-04-01 PROCEDURE — 4010F ACE/ARB THERAPY RXD/TAKEN: CPT | Mod: CPTII,S$GLB,, | Performed by: FAMILY MEDICINE

## 2025-04-01 PROCEDURE — 3008F BODY MASS INDEX DOCD: CPT | Mod: CPTII,S$GLB,, | Performed by: FAMILY MEDICINE

## 2025-04-01 PROCEDURE — 99215 OFFICE O/P EST HI 40 MIN: CPT | Mod: S$GLB,,, | Performed by: FAMILY MEDICINE

## 2025-04-01 PROCEDURE — 99999 PR PBB SHADOW E&M-EST. PATIENT-LVL V: CPT | Mod: PBBFAC,,, | Performed by: FAMILY MEDICINE

## 2025-04-01 RX ORDER — DICLOFENAC SODIUM 10 MG/G
2 GEL TOPICAL 2 TIMES DAILY
Qty: 50 G | Refills: 11 | Status: SHIPPED | OUTPATIENT
Start: 2025-04-01 | End: 2025-04-11

## 2025-04-01 NOTE — ED PROVIDER NOTES
Encounter Date: 3/31/2025    SCRIBE #1 NOTE: I, Rabia Brumfield, am scribing for, and in the presence of,  Keenan Herring MD. I have scribed the following portions of the note - Other sections scribed: HPI,ROS,PE,MDM.       History     Chief Complaint   Patient presents with    Headache     Pt spoke to neurologist today and was told to come to ER today for CT scan. Pt hit head last week and has been having intermittent, frequent headaches since.      Tee Mtz is a 69 y.o. male, with a PMHx of CAD,HLD,HTN, emphysema of lung , who presents to the ED with complaint of headache with associated neck pain s/p fall that occurred 3 days ago. Patient reports he slipped and fall 3 days ago. States he visited ED after injury but denies having imaging done. States he visited his neurologist who advised him to visit ED to be evaluated.  No other exacerbating or alleviating factors. Denies LOC or other associated symptoms. Patient denies any other complaints at this time.     Per chart review 3/28/25, patient visited this ED after slip and fall on rocks. patient's imaging were negative for acute bony fractures or dislocations. Patient was discharged after received acetaminophen tablet in ED and advised to follow with orthopedics.     The history is provided by the patient. No  was used.     Review of patient's allergies indicates:  No Known Allergies  Past Medical History:   Diagnosis Date    Coronary artery disease     Emphysema of lung     Hyperlipidemia     Hypertension      Past Surgical History:   Procedure Laterality Date    CARDIAC CATHETERIZATION  2004    x2    HIP SURGERY       Family History   Problem Relation Name Age of Onset    Heart attack Mother      Heart disease Mother      Hypertension Mother       Social History[1]  Review of Systems   Constitutional:  Negative for fever.   HENT:  Negative for sore throat.    Respiratory:  Negative for shortness of breath.    Cardiovascular:   Negative for chest pain.   Gastrointestinal:  Negative for diarrhea, nausea and vomiting.   Genitourinary:  Negative for dysuria.   Musculoskeletal:  Positive for neck pain. Negative for back pain.   Skin:  Negative for rash.   Neurological:  Positive for headaches. Negative for weakness.        (-) LOC    Psychiatric/Behavioral:  Negative for behavioral problems.    All other systems reviewed and are negative.      Physical Exam     Initial Vitals [03/31/25 1800]   BP Pulse Resp Temp SpO2   (!) 134/90 (!) 49 18 98.5 °F (36.9 °C) 99 %      MAP       --         Physical Exam    Nursing note and vitals reviewed.  Constitutional: He appears well-developed and well-nourished.   HENT:   Head: Normocephalic and atraumatic.   Eyes: Conjunctivae are normal.   Neck: Neck supple.   Normal range of motion.  Cardiovascular:  Normal rate, regular rhythm and normal heart sounds.     Exam reveals no gallop and no friction rub.       No murmur heard.  Pulmonary/Chest: Breath sounds normal. No respiratory distress. He has no wheezes. He has no rhonchi. He has no rales.   Abdominal: Abdomen is soft. There is no abdominal tenderness.   Musculoskeletal:         General: No edema. Normal range of motion.      Cervical back: Normal range of motion and neck supple. Tenderness present.      Comments: Cervical pain on movement with paraspinal muscular tenderness. Slight midline cervical tenderness to palpation.      Neurological: He is alert and oriented to person, place, and time. GCS score is 15. GCS eye subscore is 4. GCS verbal subscore is 5. GCS motor subscore is 6.   Skin: Skin is warm and dry.   Psychiatric: He has a normal mood and affect.         ED Course   Procedures  Labs Reviewed - No data to display       Imaging Results              CT Cervical Spine Without Contrast (Final result)  Result time 03/31/25 20:50:09      Final result by Rocio Mcdaniel MD (03/31/25 20:50:09)                   Impression:      No acute  intracranial abnormality detected.  Induration at the left posterolateral vertex.    Partial opacification of the left mastoid air cells.    No acute cervical fracture.  Spondylitic changes.  No significant changes.      Electronically signed by: Rocio Mcdaniel  Date:    03/31/2025  Time:    20:50               Narrative:    EXAMINATION:  CT OF THE HEAD WITHOUT AND CT CERVICAL SPINE    CLINICAL HISTORY:  Hit head last week and having intermittent, frequent headaches since.    TECHNIQUE:  5 mm unenhanced axial images were obtained from the skull base to the vertex.  1.25 mm axial images were obtained through the cervical spine.    COMPARISON:  CT head 03/09/2022    FINDINGS:  CT head: The ventricles, basal cisterns, and cortical sulci are within normal limits for patient's stated age. There is no acute intracranial hemorrhage, territorial infarct or mass effect, or midline shift. In the visualized paranasal sinuses and mastoid air cells, there is partial opacification of left mastoid air cells.  Induration is again seen at the left posterolateral vertex.    CT cervical spine: There is stable alignment of the cervical spine.  There is no acute fracture.  There is grade 1 anterolisthesis of C5 on C6 and retrolisthesis of C6 on C7.  There is significant intervertebral disc space narrowing, endplate sclerosis, marginal osteophytes, and vacuum phenomena at C6/C7.  Left-sided facet arthrosis present.  The lungs are mildly emphysematous.                                       CT Head Without Contrast (Final result)  Result time 03/31/25 20:50:09      Final result by Rocio Mcdaniel MD (03/31/25 20:50:09)                   Impression:      No acute intracranial abnormality detected.  Induration at the left posterolateral vertex.    Partial opacification of the left mastoid air cells.    No acute cervical fracture.  Spondylitic changes.  No significant changes.      Electronically signed by: Rocio  Jonas  Date:    03/31/2025  Time:    20:50               Narrative:    EXAMINATION:  CT OF THE HEAD WITHOUT AND CT CERVICAL SPINE    CLINICAL HISTORY:  Hit head last week and having intermittent, frequent headaches since.    TECHNIQUE:  5 mm unenhanced axial images were obtained from the skull base to the vertex.  1.25 mm axial images were obtained through the cervical spine.    COMPARISON:  CT head 03/09/2022    FINDINGS:  CT head: The ventricles, basal cisterns, and cortical sulci are within normal limits for patient's stated age. There is no acute intracranial hemorrhage, territorial infarct or mass effect, or midline shift. In the visualized paranasal sinuses and mastoid air cells, there is partial opacification of left mastoid air cells.  Induration is again seen at the left posterolateral vertex.    CT cervical spine: There is stable alignment of the cervical spine.  There is no acute fracture.  There is grade 1 anterolisthesis of C5 on C6 and retrolisthesis of C6 on C7.  There is significant intervertebral disc space narrowing, endplate sclerosis, marginal osteophytes, and vacuum phenomena at C6/C7.  Left-sided facet arthrosis present.  The lungs are mildly emphysematous.                                       Medications   butalbital-acetaminophen-caffeine -40 mg per tablet 2 tablet (2 tablets Oral Given 3/31/25 1930)     Medical Decision Making  Tee Mtz is a 69 y.o. male, with a PMHx of CAD,HLD,HTN, emphysema of lung , who presents to the ED with complaint of headache with associated neck pain s/p fall that occurred 3 days ago. Patient reports he slipped and fall 3 days ago. States he visited ED after injury but denies having imaging done. States he visited his neurologist who advised him to visit ED to be evaluated.  No other exacerbating or alleviating factors. Denies LOC or other associated symptoms. Patient denies any other complaints at this time.     Per chart review 3/28/25, patient  visited this ED after slip and fall on rocks. patient's imaging were negative for acute bony fractures or dislocations. Patient was discharged after received acetaminophen tablet in ED and advised to follow with orthopedics.   Course of ED stay:   CT of head and C-spine revealed no acute abnormalities.  Patient was given instructions for head injury and concussion.  Fioricet was given, patient reports headache resolution prior to discharge and patient was advised to follow-up with his primary care physician within the next week for re-evaluation/return to the emergency department if condition worsens.    Amount and/or Complexity of Data Reviewed  Radiology: ordered. Decision-making details documented in ED Course.    Risk  Prescription drug management.            Scribe Attestation:   Scribe #1: I performed the above scribed service and the documentation accurately describes the services I performed. I attest to the accuracy of the note.                             This document was produced by a scribe under my direction and in my presence. I agree with the content of the note and have made any necessary edits.     Dr. Herring    04/01/2025 2:57 AM    Clinical Impression:  Final diagnoses:  [S09.90XA] Injury of head, initial encounter (Primary)  [S06.0X0A] Concussion without loss of consciousness, initial encounter          ED Disposition Condition    Discharge Stable          ED Prescriptions       Medication Sig Dispense Start Date End Date Auth. Provider    butalbital-acetaminophen-caffeine -40 mg (FIORICET, ESGIC) -40 mg per tablet  (Status: Discontinued) Take 2 tablets by mouth every 8 (eight) hours as needed for Headaches. 30 tablet 3/31/2025 3/31/2025 Keenan Herring MD    butalbital-acetaminophen-caffeine -40 mg (FIORICET, ESGIC) -40 mg per tablet  (Status: Discontinued) Take 2 tablets by mouth every 8 (eight) hours as needed for Headaches. 30 tablet 3/31/2025 3/31/2025 Nima  Keenan ALVAREZ MD          Follow-up Information       Follow up With Specialties Details Why Contact Info    Jj Montgomery MD Family Medicine Schedule an appointment as soon as possible for a visit in 2 days For reevaluation [address]  [facility] [address]  [facility] [address]  [phone]                   [1]   Social History  Tobacco Use    Smoking status: Former     Current packs/day: 0.00     Types: Cigarettes     Quit date: 1993     Years since quittin.2    Smokeless tobacco: Never   Substance Use Topics    Alcohol use: Yes     Comment: 2-3/ week    Drug use: Yes     Types: Marijuana     Comment: every now and then        Keenan Herring MD  [phone]

## 2025-04-02 NOTE — PROGRESS NOTES
CHIEF COMPLAINT: Follow-up ER for syncope w/hypertension and hyperlipidemia    HISTORY OF PRESENT ILLNESS: The patient is a 69 -year-old white male.  Blood pressure excellent today.  About 10 days ago he was fishing.  The fish were not biting.  He was walking back to his vehicle and slipped on some rocks.  He fell injuring his shoulders and hip.  He was seen in the emergency room and had x-rays done which were unremarkable.  Subsequently over the next couple of days he developed a headache and returned to the ER.  He had an unremarkable CAT scan was diagnosed with concussion.  Overall things are getting better except for the hip.  We will get him set up with orthopedics    Previously seen in the emergency room with elevated blood pressure.  Also bradycardia.  He was referred to Cardiology.  Medication changes as below    Fall 6/23 resulted in him ultimately getting an MRI 7/23.  Multiple bilateral renal cysts both simple and complex were seen.    SBO resolved with conservative Tx    The patient has chronic back pain.  He can no longer see his previous pain management physician due to insurance reasons.      The patient has a history of stable hypertension on current medications.  Patient denies chest pain or shortness of breath today.    The patient has a history of stable hyperlipidemia on current medications.  The patient denies chest pain or shortness of breath today.  The patient denies muscle aches or myalgias suggestive of myositis.    REVIEW OF SYSTEMS:  GENERAL: No fever, chills, fatigability or weight loss.  SKIN: No rashes, itching or changes in color or texture of skin.  HEAD: Has headaches + recent head trauma.  EYES: Visual acuity fine. No photophobia, ocular pain or diplopia.  EARS: Denies ear pain, discharge or vertigo.  NOSE: No loss of smell, no epistaxis or postnasal drip.  MOUTH & THROAT: No hoarseness or change in voice. No excessive gum bleeding.  NODES: Denies swollen glands.  CHEST: Denies  "RODARTE, cyanosis, wheezing, cough and sputum production.  CARDIOVASCULAR: Denies chest pain, PND, orthopnea or reduced exercise tolerance.  ABDOMEN: Appetite fine. No weight loss. Denies diarrhea, abdominal pain, hematemesis or blood in stool.  URINARY: No flank pain, dysuria or hematuria.  PERIPHERAL VASCULAR: No claudication or cyanosis.  MUSCULOSKELETAL: No joint stiffness or swelling.   NEUROLOGIC: No history of seizures, paralysis, alteration of gait or coordination.    SOCIAL HISTORY: The patient does not smoke.  The patient consumes alcohol socially.      PHYSICAL EXAMINATION:   Blood pressure 126/64, pulse 60, height 5' 9" (1.753 m), weight 90.9 kg (200 lb 6.4 oz), SpO2 99%.    APPEARANCE: Well nourished, well developed, in no acute distress.    HEAD: Normocephalic, atraumatic.  EYES: PERRL. EOMI.  Conjunctivae without injection and  anicteric  NOSE: Mucosa pink. Airway clear.  MOUTH & THROAT: No tonsillar enlargement. No pharyngeal erythema or exudate. No stridor.  NECK: Supple.   NODES: No cervical, axillary or inguinal lymph node enlargement.  CHEST: Lungs clear to auscultation.  No retractions are noted.  No rales or rhonchi are present.  CARDIOVASCULAR: Normal S1, S2. No rubs, murmurs or gallops.  ABDOMEN: Bowel sounds normal. Not distended. Soft. No tenderness or masses.  No ascites is noted.  MUSCULOSKELETAL:  There is no clubbing, cyanosis, or edema of the extremities x4.  There is full range of motion of the lumbar spine.  There is full range of motion of the extremities x4.  There is no deformity noted.    NEUROLOGIC:       Normal speech development.      Hearing normal.      Normal gait.      Motor and sensory exams grossly normal.  PSYCHIATRIC: Patient is alert and oriented x3.  Thought processes are all normal.  There is no homicidality.  There is no suicidality.  There is no evidence of psychosis.    LABORATORY/RADIOLOGY:   Chart reviewed.      ASSESSMENT:   Concussion  Children hip " pain    Asymptomatic bradycardia due to beta-blocker, resolved  Hypertension  SBO, resolved  Snoring likely KIRA  Chronic low back pain  Hyperlipidemia   Insomnia      PLAN:  We will get him set up with Orthopedics  Continue current medication  Ophthalmology referral  Sleep clinic referral  Trazodone 100 qhs  Return to clinic in 3 months same day as cardiology appointment

## 2025-04-28 DIAGNOSIS — F11.20 OPIOID DEPENDENCE, UNCOMPLICATED: Primary | ICD-10-CM

## 2025-04-28 NOTE — TELEPHONE ENCOUNTER
Refill Encounter    PCP Visits: Recent Visits  Date Type Provider Dept   04/01/25 Office Visit Jj Montgomery MD [facility] Internal Medicine   01/08/25 Office Visit Jj Montgomery MD [facility] Internal Medicine   10/08/24 Office Visit Jj Montgomery MD [facility] Medicine   09/20/24 Office Visit Bell Littlejohn FNP-C [facility] Internal Medicine   06/19/24 Office Visit Jj Montgomery MD [facility] Internal Medicine   Showing recent visits within past 360 days and meeting all other requirements  Future Appointments  No visits were found meeting these conditions.  Showing future appointments within next 720 days and meeting all other requirements      Last 3 Blood Pressure:   BP Readings from Last 3 Encounters:   04/01/25 126/64   03/31/25 (!) 134/90   03/28/25 (!) 147/81     Preferred Pharmacy:   [email or web address] [place] [id number] [facility] [address]  Phone: [phone] Fax: [phone]    Requested RX:  Requested Prescriptions     Pending Prescriptions Disp Refills    pregabalin (LYRICA) 300 MG Cap [Pharmacy Med Name: PREGABALIN 300 MG CAPS 300 Capsule] 60 capsule 4     Sig: TAKE ONE CAPSULE BY MOUTH TWO TIMES A DAY      RX Route: Normal

## 2025-04-28 NOTE — TELEPHONE ENCOUNTER
Unable to retrieve patient chart and identify care due.  [facility] Embedded Care Due Messages. Reference number: [id number].   4/28/2025 12:14:58 PM CDT

## 2025-04-29 RX ORDER — PREGABALIN 300 MG/1
300 CAPSULE ORAL 2 TIMES DAILY
Qty: 60 CAPSULE | Refills: 4 | Status: SHIPPED | OUTPATIENT
Start: 2025-04-29

## 2025-05-02 DIAGNOSIS — E78.5 HYPERLIPIDEMIA LDL GOAL <100: ICD-10-CM

## 2025-05-02 RX ORDER — ATORVASTATIN CALCIUM 80 MG/1
80 TABLET, FILM COATED ORAL DAILY
Qty: 90 TABLET | Refills: 0 | Status: SHIPPED | OUTPATIENT
Start: 2025-05-02

## 2025-05-02 NOTE — TELEPHONE ENCOUNTER
Provider Staff:  Action required for this patient    Requires appointment   Requires labs      Please see care gap opportunities below in Care Due Message.    Thanks!  [facility]     Appointments      Date Provider   Last Visit   4/1/2025 Jj Montgomery MD   Next Visit   Visit date not found Jj Montgomery MD     Refill Decision Note   Tee Mtz  is requesting a refill authorization.  Brief Assessment and Rationale for Refill:  Approve     Medication Therapy Plan:        Comments:     Note composed:4:33 PM 05/02/2025

## 2025-05-02 NOTE — TELEPHONE ENCOUNTER
Refill Encounter    PCP Visits: Recent Visits  Date Type Provider Dept   04/01/25 Office Visit Jj Montgomery MD [facility] Internal Medicine   01/08/25 Office Visit Jj Montgomery MD [facility]   10/08/24 Office Visit Jj Montgomery MD [facility]   09/20/24 Office Visit Bell Littlejohn FNP-C [facility]   06/19/24 Office Visit Jj Montgomery MD [facility]   Showing recent visits within past 360 days and meeting all other requirements  Future Appointments  No visits were found meeting these conditions.  Showing future appointments within next 720 days and meeting all other requirements      Last 3 Blood Pressure:   BP Readings from Last 3 Encounters:   04/01/25 126/64   03/31/25 (!) 134/90   03/28/25 (!) 147/81     Preferred Pharmacy:   [facility] [place] [id number] [facility] [address]  Phone: [phone] Fax: [phone]    Requested RX:  Requested Prescriptions     Pending Prescriptions Disp Refills    atorvastatin (LIPITOR) 80 MG tablet 90 tablet 3     Sig: Take 1 tablet (80 mg total) by mouth once daily.      RX Route: Normal

## 2025-05-02 NOTE — TELEPHONE ENCOUNTER
Care Due:                  Date            Visit Type   Department     Provider  --------------------------------------------------------------------------------                                EP -                              PRIMARY      [facility] INTERNAL  Jj Ugo  Last Visit: 04-      [facility]   [facility]  Next Visit: None Scheduled  None         None Found                                                            Last  Test          Frequency    Reason                     Performed    Due Date  --------------------------------------------------------------------------------    Lipid Panel.  12 months..  atorvastatin.............  06- 06-    [facility] Embedded Care Due Messages. Reference number: [id number].   5/02/2025 3:08:42 PM CDT

## 2025-05-28 ENCOUNTER — PATIENT OUTREACH (OUTPATIENT)
Dept: ADMINISTRATIVE | Facility: HOSPITAL | Age: 70
End: 2025-05-28
Payer: MEDICARE

## 2025-05-28 DIAGNOSIS — R73.03 PRE-DIABETES: Primary | ICD-10-CM

## 2025-05-28 DIAGNOSIS — Z13.6 SCREENING FOR ABDOMINAL AORTIC ANEURYSM: ICD-10-CM

## 2025-05-28 NOTE — PROGRESS NOTES
Health Maintenance reviewed, links triggered and updated, A1c and AAA screening due (fford) 5/28/25  Vbc due Next Lab Visit Date: 6/2/2025 and AAA Screening - Outreach Outcomes & Actions Taken  : Aorta Scan or Aortic (AAA) Ultrasound Scheduled  Care Coordination Encounter Details:       MyChart Portal Status:         [x]  Reviewed MyChart Portal Status offered / enrolled if applicable        Additional Notes:     MyChart Outcomes: Pt is enrolled & active          Updates Requested / Reviewed:        Care Everywhere         Health Maintenance Screening(s) Due:      Health Maintenance Topics Overdue:      VBHM Score: 0     Patient is not due for any topics at this time.    Pneumonia Vaccine  Tetanus Vaccine  Shingles/Zoster Vaccine  RSV Vaccine                  Health Maintenance Topic(s) Outreach Outcomes & Actions Taken:    Lab(s) - Outreach Outcomes & Actions Taken  : Overdue Lab(s) Scheduled Next Lab Visit Date: 6/2/2025 @0815    AAA Screening - Outreach Outcomes & Actions Taken  : Aorta Scan or Aortic (AAA) Ultrasound Scheduled Appt on 6/2/25 @0900                  Additional Notes:  Health Maintenance reviewed, links triggered and updated, A1c and AAA screening due (fford) 5/28/25  Vbc due Next Lab Visit Date: 6/2/2025 and AAA Screening - Outreach Outcomes & Actions Taken  : Aorta Scan or Aortic (AAA) Ultrasound Scheduled           Chronic Disease Management:     Diabetes Measures        Lab Results   Component Value Date    HGBA1C 5.8 (H) 06/19/2024           [x]  Reviewed chart for active Diabetes diagnosis     [x]  Scheduled necessary follow up appointments if needed         Additional Notes:  Next Lab Visit Date: 6/2/2025            Hypertension Measures        BP Readings from Last 1 Encounters:   04/01/25 126/64           [x]  Reviewed chart for active Hypertension diagnosis     [x]  Reviewed & documented Home BP Cuff     [x]  Documented a Remote BP if needed & applicable     [x]  Scheduled necessary  follow up appointments with Primary Care if needed         Additional Notes:    WNL         Provider Team Continuity:     Last PCP Visit Date: 4/1/2025          [x]  Reviewed Primary Care Provider Visits, Annual Wellness Visit, and Future          Appointments to ensure appointments have been scheduled and/or           completed        Additional Notes:             Social Determinants of Health          [x]  Reviewed, completed, and/or updated the following sections:                  Food Insecurity, Transportation Needs, Financial Resource Strain,                 Tobacco Use        Additional Notes:             Care Management, Digital Medicine, and/or Education Referrals    OPCM Risk Score: 66.2                 Additional Notes:

## 2025-06-02 ENCOUNTER — HOSPITAL ENCOUNTER (OUTPATIENT)
Dept: RADIOLOGY | Facility: OTHER | Age: 70
Discharge: HOME | End: 2025-06-02
Attending: FAMILY MEDICINE
Payer: MEDICARE

## 2025-06-02 DIAGNOSIS — Z13.6 SCREENING FOR ABDOMINAL AORTIC ANEURYSM: ICD-10-CM

## 2025-06-02 PROCEDURE — 76775 US EXAM ABDO BACK WALL LIM: CPT | Mod: TC

## 2025-06-02 PROCEDURE — 76775 US EXAM ABDO BACK WALL LIM: CPT | Mod: 26,,, | Performed by: RADIOLOGY

## 2025-06-03 ENCOUNTER — RESULTS FOLLOW-UP (OUTPATIENT)
Dept: INTERNAL MEDICINE | Facility: CLINIC | Age: 70
End: 2025-06-03

## 2025-07-07 ENCOUNTER — OFFICE VISIT (OUTPATIENT)
Dept: INTERNAL MEDICINE | Facility: CLINIC | Age: 70
End: 2025-07-07
Payer: MEDICARE

## 2025-07-07 VITALS
BODY MASS INDEX: 29.12 KG/M2 | WEIGHT: 196.63 LBS | SYSTOLIC BLOOD PRESSURE: 116 MMHG | OXYGEN SATURATION: 98 % | HEIGHT: 69 IN | HEART RATE: 81 BPM | DIASTOLIC BLOOD PRESSURE: 74 MMHG

## 2025-07-07 DIAGNOSIS — K59.00 CONSTIPATION, UNSPECIFIED CONSTIPATION TYPE: ICD-10-CM

## 2025-07-07 DIAGNOSIS — N30.90 CYSTITIS: Primary | ICD-10-CM

## 2025-07-07 DIAGNOSIS — R30.0 DYSURIA: ICD-10-CM

## 2025-07-07 LAB
BACTERIA #/AREA URNS AUTO: ABNORMAL /HPF
BILIRUB UR QL STRIP.AUTO: NEGATIVE
CLARITY UR: ABNORMAL
COLOR UR AUTO: YELLOW
GLUCOSE UR QL STRIP: NEGATIVE
HGB UR QL STRIP: ABNORMAL
HYALINE CASTS UR QL AUTO: 0 /LPF (ref 0–1)
KETONES UR QL STRIP: NEGATIVE
LEUKOCYTE ESTERASE UR QL STRIP: ABNORMAL
MICROSCOPIC COMMENT: ABNORMAL
NITRITE UR QL STRIP: NEGATIVE
NON-SQ EPI CELLS #/AREA URNS AUTO: 2 /HPF
PH UR STRIP: 7 [PH]
PROT UR QL STRIP: ABNORMAL
RBC #/AREA URNS AUTO: 6 /HPF (ref 0–4)
SP GR UR STRIP: >=1.03
SQUAMOUS #/AREA URNS AUTO: 1 /HPF
UROBILINOGEN UR STRIP-ACNC: NEGATIVE EU/DL
WBC #/AREA URNS AUTO: 97 /HPF (ref 0–5)

## 2025-07-07 PROCEDURE — 1126F AMNT PAIN NOTED NONE PRSNT: CPT | Mod: CPTII,S$GLB,,

## 2025-07-07 PROCEDURE — 99999 PR PBB SHADOW E&M-EST. PATIENT-LVL IV: CPT | Mod: PBBFAC,,,

## 2025-07-07 PROCEDURE — 99214 OFFICE O/P EST MOD 30 MIN: CPT | Mod: S$GLB,,,

## 2025-07-07 PROCEDURE — 3044F HG A1C LEVEL LT 7.0%: CPT | Mod: CPTII,S$GLB,,

## 2025-07-07 PROCEDURE — 3074F SYST BP LT 130 MM HG: CPT | Mod: CPTII,S$GLB,,

## 2025-07-07 PROCEDURE — 1101F PT FALLS ASSESS-DOCD LE1/YR: CPT | Mod: CPTII,S$GLB,,

## 2025-07-07 PROCEDURE — 1159F MED LIST DOCD IN RCRD: CPT | Mod: CPTII,S$GLB,,

## 2025-07-07 PROCEDURE — 4010F ACE/ARB THERAPY RXD/TAKEN: CPT | Mod: CPTII,S$GLB,,

## 2025-07-07 PROCEDURE — 3288F FALL RISK ASSESSMENT DOCD: CPT | Mod: CPTII,S$GLB,,

## 2025-07-07 PROCEDURE — 3078F DIAST BP <80 MM HG: CPT | Mod: CPTII,S$GLB,,

## 2025-07-07 PROCEDURE — 81003 URINALYSIS AUTO W/O SCOPE: CPT

## 2025-07-07 PROCEDURE — 87186 SC STD MICRODIL/AGAR DIL: CPT

## 2025-07-07 PROCEDURE — 3008F BODY MASS INDEX DOCD: CPT | Mod: CPTII,S$GLB,,

## 2025-07-07 RX ORDER — NITROFURANTOIN 25; 75 MG/1; MG/1
100 CAPSULE ORAL 2 TIMES DAILY
Qty: 10 CAPSULE | Refills: 0 | Status: SHIPPED | OUTPATIENT
Start: 2025-07-07 | End: 2025-07-10
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
## 2025-07-10 ENCOUNTER — TELEPHONE (OUTPATIENT)
Dept: INTERNAL MEDICINE | Facility: CLINIC | Age: 70
End: 2025-07-10
Payer: MEDICARE

## 2025-07-10 ENCOUNTER — PATIENT MESSAGE (OUTPATIENT)
Dept: INTERNAL MEDICINE | Facility: CLINIC | Age: 70
End: 2025-07-10
Payer: MEDICARE

## 2025-07-10 DIAGNOSIS — N30.90 CYSTITIS: Primary | ICD-10-CM

## 2025-07-10 LAB — BACTERIA UR CULT: ABNORMAL

## 2025-07-10 RX ORDER — SULFAMETHOXAZOLE AND TRIMETHOPRIM 800; 160 MG/1; MG/1
1 TABLET ORAL 2 TIMES DAILY
Qty: 10 TABLET | Refills: 0 | Status: SHIPPED | OUTPATIENT
Start: 2025-07-10

## 2025-07-10 NOTE — TELEPHONE ENCOUNTER
----- Message from Heron Mullins NP sent at 7/10/2025  7:38 AM CDT -----  Regarding: antibiotic  Please call pt and inform him that we need to change antibiotics because the bacteria is resistant to the one he is currently taking. I have called in 5 days of bactrim. He should pick it up and take the full course to ensure he clear the infection.

## 2025-07-11 ENCOUNTER — TELEPHONE (OUTPATIENT)
Dept: INTERNAL MEDICINE | Facility: CLINIC | Age: 70
End: 2025-07-11
Payer: MEDICARE

## 2025-07-11 NOTE — TELEPHONE ENCOUNTER
----- Message from Heron Mullins NP sent at 7/11/2025  3:21 PM CDT -----  Please try him again    ----- Message -----  From: Lab, Background User  Sent: 7/7/2025   8:30 PM CDT  To: Heron Mullins NP

## 2025-07-30 DIAGNOSIS — E78.5 HYPERLIPIDEMIA LDL GOAL <100: ICD-10-CM

## 2025-07-30 RX ORDER — ATORVASTATIN CALCIUM 80 MG/1
80 TABLET, FILM COATED ORAL DAILY
Qty: 90 TABLET | Refills: 0 | Status: SHIPPED | OUTPATIENT
Start: 2025-07-30

## 2025-07-30 NOTE — TELEPHONE ENCOUNTER
Care Due:                  Date            Visit Type   Department     Provider  --------------------------------------------------------------------------------                                EP -                              PRIMARY      [facility] INTERNAL  Jj Ugo  Last Visit: 04-      [facility] ([facility]   [facility]  Next Visit: None Scheduled  None         None Found                                                            Last  Test          Frequency    Reason                     Performed    Due Date  --------------------------------------------------------------------------------    Lipid Panel.  12 months..  atorvastatin.............  06- 06-    [facility] Embedded Care Due Messages. Reference number: [id number].   7/30/2025 9:04:22 AM CDT

## 2025-07-30 NOTE — TELEPHONE ENCOUNTER
Refill Routing Note   Medication(s) are not appropriate for processing by [facility] for the following reason(s):        Required labs outdated    [facility] action(s):  Defer     Requires labs : Yes             Appointments  past 12m or future 3m with PCP    Date Provider   Last Visit   4/1/2025 Jj Montgomery MD   Next Visit   Visit date not found Jj Montgomery MD   ED visits in past 90 days: 0        Note composed:1:39 PM 07/30/2025

## 2025-08-09 DIAGNOSIS — I10 ESSENTIAL HYPERTENSION: ICD-10-CM

## 2025-08-09 DIAGNOSIS — I10 PRIMARY HYPERTENSION: ICD-10-CM

## 2025-08-10 RX ORDER — LISINOPRIL 40 MG/1
40 TABLET ORAL
Qty: 90 TABLET | Refills: 2 | Status: SHIPPED | OUTPATIENT
Start: 2025-08-10

## 2025-08-10 RX ORDER — AMLODIPINE BESYLATE 10 MG/1
10 TABLET ORAL
Qty: 90 TABLET | Refills: 2 | Status: SHIPPED | OUTPATIENT
Start: 2025-08-10

## 2025-08-12 DIAGNOSIS — E78.5 HYPERLIPIDEMIA LDL GOAL <100: ICD-10-CM

## 2025-08-12 RX ORDER — ATORVASTATIN CALCIUM 80 MG/1
80 TABLET, FILM COATED ORAL DAILY
Qty: 90 TABLET | Refills: 0 | Status: SHIPPED | OUTPATIENT
Start: 2025-08-12

## 2025-08-12 RX ORDER — OMEPRAZOLE 40 MG/1
40 CAPSULE, DELAYED RELEASE ORAL
Qty: 90 CAPSULE | Refills: 2 | Status: SHIPPED | OUTPATIENT
Start: 2025-08-12